# Patient Record
Sex: FEMALE | Race: WHITE | Employment: PART TIME | URBAN - METROPOLITAN AREA
[De-identification: names, ages, dates, MRNs, and addresses within clinical notes are randomized per-mention and may not be internally consistent; named-entity substitution may affect disease eponyms.]

---

## 2017-01-23 RX ORDER — BENAZEPRIL HYDROCHLORIDE 20 MG/1
TABLET ORAL
Qty: 30 TAB | Refills: 0 | Status: SHIPPED | OUTPATIENT
Start: 2017-01-23 | End: 2017-02-01 | Stop reason: SDUPTHER

## 2017-02-01 ENCOUNTER — OFFICE VISIT (OUTPATIENT)
Dept: FAMILY MEDICINE CLINIC | Age: 67
End: 2017-02-01

## 2017-02-01 ENCOUNTER — HOSPITAL ENCOUNTER (OUTPATIENT)
Dept: LAB | Age: 67
Discharge: HOME | End: 2017-02-01
Payer: MEDICARE

## 2017-02-01 VITALS
BODY MASS INDEX: 50.48 KG/M2 | SYSTOLIC BLOOD PRESSURE: 137 MMHG | TEMPERATURE: 96.6 F | HEIGHT: 59 IN | WEIGHT: 250.4 LBS | RESPIRATION RATE: 18 BRPM | HEART RATE: 64 BPM | OXYGEN SATURATION: 97 % | DIASTOLIC BLOOD PRESSURE: 68 MMHG

## 2017-02-01 DIAGNOSIS — K21.9 GASTROESOPHAGEAL REFLUX DISEASE WITHOUT ESOPHAGITIS: ICD-10-CM

## 2017-02-01 DIAGNOSIS — N39.3 STRESS INCONTINENCE: ICD-10-CM

## 2017-02-01 DIAGNOSIS — F41.9 ANXIETY: ICD-10-CM

## 2017-02-01 DIAGNOSIS — Z23 ENCOUNTER FOR IMMUNIZATION: ICD-10-CM

## 2017-02-01 DIAGNOSIS — I10 ESSENTIAL HYPERTENSION: Primary | ICD-10-CM

## 2017-02-01 PROCEDURE — 36415 COLL VENOUS BLD VENIPUNCTURE: CPT

## 2017-02-01 PROCEDURE — 80048 BASIC METABOLIC PNL TOTAL CA: CPT

## 2017-02-01 RX ORDER — ALPRAZOLAM 0.25 MG/1
0.25 TABLET ORAL
Qty: 60 TAB | Refills: 1 | Status: SHIPPED | OUTPATIENT
Start: 2017-02-01 | End: 2017-05-08 | Stop reason: SDUPTHER

## 2017-02-01 RX ORDER — BENAZEPRIL HYDROCHLORIDE 20 MG/1
TABLET ORAL
Qty: 30 TAB | Refills: 5 | Status: SHIPPED | OUTPATIENT
Start: 2017-02-01 | End: 2017-10-17 | Stop reason: SDUPTHER

## 2017-02-01 RX ORDER — NAPROXEN SODIUM 220 MG
220 TABLET ORAL 2 TIMES DAILY WITH MEALS
COMMUNITY
End: 2017-11-18

## 2017-02-01 RX ORDER — FUROSEMIDE 20 MG/1
20 TABLET ORAL DAILY
Qty: 30 TAB | Refills: 5 | Status: SHIPPED | OUTPATIENT
Start: 2017-02-01

## 2017-02-01 RX ORDER — OXYBUTYNIN CHLORIDE 5 MG/1
5 TABLET ORAL 2 TIMES DAILY
Qty: 60 TAB | Refills: 5 | Status: SHIPPED | OUTPATIENT
Start: 2017-02-01 | End: 2017-08-15 | Stop reason: SINTOL

## 2017-02-01 RX ORDER — OMEPRAZOLE 20 MG/1
20 CAPSULE, DELAYED RELEASE ORAL DAILY
Qty: 30 CAP | Refills: 5 | Status: SHIPPED | OUTPATIENT
Start: 2017-02-01 | End: 2017-12-19 | Stop reason: SDUPTHER

## 2017-02-02 LAB
BUN SERPL-MCNC: 19 MG/DL (ref 8–27)
BUN/CREAT SERPL: 30 (ref 11–26)
CALCIUM SERPL-MCNC: 9.1 MG/DL (ref 8.7–10.3)
CHLORIDE SERPL-SCNC: 101 MMOL/L (ref 96–106)
CO2 SERPL-SCNC: 25 MMOL/L (ref 18–29)
CREAT SERPL-MCNC: 0.63 MG/DL (ref 0.57–1)
GLUCOSE SERPL-MCNC: 87 MG/DL (ref 65–99)
POTASSIUM SERPL-SCNC: 4 MMOL/L (ref 3.5–5.2)
SODIUM SERPL-SCNC: 139 MMOL/L (ref 134–144)

## 2017-05-08 DIAGNOSIS — F41.9 ANXIETY: ICD-10-CM

## 2017-05-08 RX ORDER — ALPRAZOLAM 0.25 MG/1
TABLET ORAL
Qty: 60 TAB | Refills: 0 | Status: SHIPPED | OUTPATIENT
Start: 2017-05-08 | End: 2017-07-18 | Stop reason: SDUPTHER

## 2017-07-18 ENCOUNTER — HOSPITAL ENCOUNTER (OUTPATIENT)
Dept: LAB | Age: 67
Discharge: HOME | End: 2017-07-18
Payer: MEDICARE

## 2017-07-18 ENCOUNTER — OFFICE VISIT (OUTPATIENT)
Dept: INTERNAL MEDICINE CLINIC | Age: 67
End: 2017-07-18

## 2017-07-18 VITALS
SYSTOLIC BLOOD PRESSURE: 144 MMHG | HEART RATE: 60 BPM | BODY MASS INDEX: 50.6 KG/M2 | DIASTOLIC BLOOD PRESSURE: 88 MMHG | WEIGHT: 251 LBS | OXYGEN SATURATION: 99 % | TEMPERATURE: 97.9 F | HEIGHT: 59 IN | RESPIRATION RATE: 18 BRPM

## 2017-07-18 DIAGNOSIS — I10 ESSENTIAL HYPERTENSION: ICD-10-CM

## 2017-07-18 DIAGNOSIS — Z23 ENCOUNTER FOR IMMUNIZATION: ICD-10-CM

## 2017-07-18 DIAGNOSIS — Z12.31 SCREENING MAMMOGRAM, ENCOUNTER FOR: ICD-10-CM

## 2017-07-18 DIAGNOSIS — E66.01 MORBID OBESITY DUE TO EXCESS CALORIES (HCC): Primary | ICD-10-CM

## 2017-07-18 DIAGNOSIS — R73.9 ELEVATED BLOOD SUGAR: ICD-10-CM

## 2017-07-18 DIAGNOSIS — E66.9 OBESITY, UNSPECIFIED OBESITY SEVERITY, UNSPECIFIED OBESITY TYPE: ICD-10-CM

## 2017-07-18 DIAGNOSIS — F41.9 ANXIETY: ICD-10-CM

## 2017-07-18 DIAGNOSIS — R73.03 PRE-DIABETES: ICD-10-CM

## 2017-07-18 DIAGNOSIS — Z12.11 COLON CANCER SCREENING: ICD-10-CM

## 2017-07-18 DIAGNOSIS — N39.3 STRESS INCONTINENCE: ICD-10-CM

## 2017-07-18 PROCEDURE — 36415 COLL VENOUS BLD VENIPUNCTURE: CPT

## 2017-07-18 PROCEDURE — 80061 LIPID PANEL: CPT

## 2017-07-18 PROCEDURE — 80053 COMPREHEN METABOLIC PANEL: CPT

## 2017-07-18 PROCEDURE — 82270 OCCULT BLOOD FECES: CPT

## 2017-07-18 PROCEDURE — 83036 HEMOGLOBIN GLYCOSYLATED A1C: CPT

## 2017-07-18 RX ORDER — ALPRAZOLAM 0.25 MG/1
0.25 TABLET ORAL
Qty: 30 TAB | Refills: 1 | Status: SHIPPED | OUTPATIENT
Start: 2017-07-18 | End: 2018-01-07 | Stop reason: SDUPTHER

## 2017-07-18 NOTE — PROGRESS NOTES
Chief Complaint   Patient presents with   Cathy Santana [facility]     new patient    Spasms     x1 year in thighs      1. Have you been to the ER, urgent care clinic since your last visit? Hospitalized since your last visit? No    2. Have you seen or consulted any other health care providers outside of the [address] [place] [address] since your last visit? Include any pap smears or colon screening.  No

## 2017-07-18 NOTE — PROGRESS NOTES
CC: Establish Care (new patient)   Anxiety  HTN   obesity     HPI:    She is a 79 y.o. female who presents to establish care and follow up on chronic medical problems     HTN:  Blood pressure is well controlled. Denies chest pain and shortness of breath  On benazepril and lasix and denies chest pain and shortness of breath   Does not check BP at home       Obesity BMI 50 - weight watchers for 2 years and has lost 50 lbs. Had a scare leading to changing eating habits- had chest pain and cardiac work up was negative\"  it was an anxiety attack - told by cardiologist you  \"Can loose weight  Or die\"  Not on medication for weight loss. Worries about developing diabetes has pre diabetes   Patient has reached plateau and after loosing 50 lbs has not been able to loose any more weight   Exercised a Curves - hs not been going as frequently lately  Cannot take phentermine due to underlying anxiety  Sister had bad reaction to Wellbutrin and patient would not want to try the medication    Anxiety: takes xanax . 25mg PRN. Does not want to get addicted. At times takes it a  night time  Can goweeks without taking. Last filled prescription in early May ( 60 pills)  and requesting refill   Denies symptoms of depression      Gyn hx   Had 3 pregnancies 2 deliveries and 1    Last pap smear 2 year ago and normal     Mammogram in may 2016 and normal   FIT testing negative in 2016    ROS:  Constitutional: negative for fevers, chills, anorexia and weight loss  Eyes:   negative for visual disturbance,  irritation  ENT:   negative for tinnitus,sore throat,nasal congestion,ear pain, sinus pain.    Respiratory:  negative for cough, hemoptysis, dyspnea,wheezing  CV:   negative for chest pain, palpitations, lower extremity edema  GI:   negative for nausea, vomiting, diarrhea, abdominal pain,melena  Genitourinary: Takes oxybutinin for overactive bladder and at times has poor control/ not doing kegel exercises  Musculoskel: negative for myalgias, arthralgias, back pain, muscle weakness, joint pain  Neurological:  negative for headaches, dizziness, focal weakness, numbness  Psych:             Negative for depression and anxiety    Past Medical History:   Diagnosis Date    Arthritis     Hypertension        Current Outpatient Prescriptions on File Prior to Visit   Medication Sig Dispense Refill    benazepril (LOTENSIN) 20 mg tablet TAKE ONE TABLET BY MOUTH ONCE DAILY 30 Tab 5    oxybutynin (DITROPAN) 5 mg tablet Take 1 Tab by mouth two (2) times a day. 60 Tab 5    omeprazole (PRILOSEC) 20 mg capsule Take 1 Cap by mouth daily. 30 Cap 5    furosemide (LASIX) 20 mg tablet Take 1 Tab by mouth daily. 30 Tab 5    [DISCONTINUED] ALPRAZolam (XANAX) 0.25 mg tablet TAKE ONE TABLET BY MOUTH TWICE DAILY AS NEEDED *MAX  DAILY  AMOUNT  0.5MG* 60 Tab 0    naproxen sodium (ALEVE) 220 mg tablet Take 220 mg by mouth two (2) times daily (with meals).  diph,Pertuss,AC,,Tet Vac-PF (BOOSTRIX) 2.5-8-5 Lf-mcg suspension 0.5 mL by IntraMUSCular route PRIOR TO DISCHARGE for 1 dose. 0.5 mL 0    naproxen (NAPROSYN) 500 mg tablet   3     No current facility-administered medications on file prior to visit. Past Surgical History:   Procedure Laterality Date    HX CHOLECYSTECTOMY      HX HEENT      HX ORTHOPAEDIC      Bilateral Knee Replacement       Family History   Problem Relation Age of Onset    Cancer Brother      Reviewed and no changes     Social History     Social History    Marital status:      Spouse name: N/A    Number of children: N/A    Years of education: N/A     Occupational History    Not on file.      Social History Main Topics    Smoking status: Never Smoker    Smokeless tobacco: Never Used    Alcohol use No    Drug use: No    Sexual activity: Not on file     Other Topics Concern    Not on file     Social History Narrative            Visit Vitals    /88 (BP 1 Location: Right arm, BP Patient Position: Sitting)    Pulse 60    Temp 97.9 °F (36.6 °C) (Oral)    Resp 18    Ht 4' 11\" (1.499 m)    Wt 251 lb (113.9 kg)    SpO2 99%    BMI 50.7 kg/m2       Physical Examination:   General - Well appearing female, morbidly obese   HEENT - PERRL, TM no erythema/opacification, normal nasal turbinates, oropharynx no erythema or exudate, MMM  Neck - supple, no bruits, no TMG, no LAD  Pulm - clear to auscultation bilaterally  Cardio - RRR, normal S1 S2, no murmur gallops or rubs  Abd - soft, nontender, no masses, no HSM  Extrem - no edema, +2 distal pulses  Psych - normal affect, appropriate mood  Skin: varicose veins palpable and non- tender     Lab Results   Component Value Date/Time    WBC 7.7 03/03/2016 11:52 AM    HGB 14.6 03/03/2016 11:52 AM    HCT 43.4 03/03/2016 11:52 AM    PLATELET 158 52/28/7802 11:52 AM    MCV 82 03/03/2016 11:52 AM     Lab Results   Component Value Date/Time    Sodium 139 02/01/2017 03:43 PM    Potassium 4.0 02/01/2017 03:43 PM    Chloride 101 02/01/2017 03:43 PM    CO2 25 02/01/2017 03:43 PM    Anion gap 6 01/30/2015 10:45 AM    Glucose 87 02/01/2017 03:43 PM    BUN 19 02/01/2017 03:43 PM    Creatinine 0.63 02/01/2017 03:43 PM    BUN/Creatinine ratio 30 02/01/2017 03:43 PM    GFR est  02/01/2017 03:43 PM    GFR est non-AA 94 02/01/2017 03:43 PM    Calcium 9.1 02/01/2017 03:43 PM     Lab Results   Component Value Date/Time    Cholesterol, total 202 05/27/2016 10:00 AM    HDL Cholesterol 53 05/27/2016 10:00 AM    LDL, calculated 135 05/27/2016 10:00 AM    VLDL, calculated 14 05/27/2016 10:00 AM    Triglyceride 69 05/27/2016 10:00 AM     Lab Results   Component Value Date/Time    TSH 1.600 03/03/2016 11:52 AM     No results found for: PSA, Alejandro Kimberly, PSAR3, [id number], [id number], PSALT  Lab Results   Component Value Date/Time    Hemoglobin A1c (POC) 5.8 05/27/2016 10:00 AM     No results found for: MARIE Ramirez    Lab Results Component Value Date/Time    ALT (SGPT) 18 03/03/2016 11:52 AM    AST (SGOT) 14 03/03/2016 11:52 AM    Alk. phosphatase 89 03/03/2016 11:52 AM    Bilirubin, total 0.5 03/03/2016 11:52 AM           Assessment/Plan:    1. Anxiety  - with occasional panic attacks. - continue current therapy ( PRN use of low dose xanax)   - discussed with patient that if use becomes frequent then need to consider maintenance therapy with SSRI   - ALPRAZolam (XANAX) 0.25 mg tablet; Take 1 Tab by mouth daily as needed for Anxiety. Dispense: 30 Tab; Refill: 1    2. Morbid obesity due to excess calories ([address]  - has lost 50 lbs intentional but now reached plateau. Patient is motivated to loose weight. I believe she is a great candidate for saxenda given she has pre diabetes also. Patient is not a good candidate for phentermine ( has anxiety) and sister had reaction to wellbutrin. - liraglutide (SAXENDA) 3 mg/0.5 mL (18 mg/3 mL) pen; 0.5 mL by SubCUTAneous route daily. Dispense: 1 Adjustable Dose Pre-filled Pen Syringe; Refill: 4 will need prior authorization   - encouraged to exercise     3. Stress incontinence  - on oxybutynin  - discussed Kegel exercises     4. Essential hypertension  - well controlled  - CMP ordered  - continue benazepril and lasix low dose     5. Screening mammogram, encounter for  - [facility] MAMMO BI SCREENING INCL CAD; Future    6. Colon cancer screening  - FIT testing     7. Pre diabetes: checking lipid panel and HA1C  - continue diet and exercise        Follow-up Disposition:  Return in about 4 weeks (around 8/15/2017) for medicare wellness visit .     MD Rafael

## 2017-07-18 NOTE — PATIENT INSTRUCTIONS
Prescribed saxenda - will need prior authorization   Continue with diet and exercise     Schedule the medicare wellness visit    Schedule mammogram    Send stool testing

## 2017-07-18 NOTE — MR AVS SNAPSHOT
Visit Information Date & Time Provider Department Dept. Phone Encounter #  
 7/18/2017  8:30 AM Nikki Orosco, [address] [place] [address],[address] [phone] [id number] Follow-up Instructions Return in about 4 weeks (around 8/15/2017) for medicare wellness visit . Upcoming Health Maintenance Date Due  
 GLAUCOMA SCREENING Q2Y 6/28/2015 OSTEOPOROSIS SCREENING (DEXA) 6/28/2015 FOBT Q 1 YEAR AGE 50-75 3/5/2017 Pneumococcal 65+ Low/Medium Risk (2 of 2 - PCV13) 5/27/2017 MEDICARE YEARLY EXAM 5/28/2017 INFLUENZA AGE 9 TO ADULT 8/1/2017 BREAST CANCER SCRN MAMMOGRAM 6/28/2018 DTaP/Tdap/Td series (2 - Td) 3/22/2027 Allergies as of 7/18/2017  Review Complete On: 7/18/2017 By: Nato Bello Severity Noted Reaction Type Reactions Latex Medium 05/27/2016    Rash Adhesive Medium 08/25/2016    Rash Raspberry Medium 05/27/2016    Rash Tetracycline Low 02/29/2012   Systemic Hives Current Immunizations  Reviewed on 3/23/2017 Name Date Pneumococcal Conjugate (PCV-13)  Incomplete Pneumococcal Polysaccharide (PPSV-23) 5/27/2016 Tdap 3/22/2017 Not reviewed this visit You Were Diagnosed With   
  
 Codes Comments Morbid obesity due to excess calories ([facility] [address]    -  Primary ICD-10-CM: E66.01 
ICD-9-CM: 278.01 Anxiety     ICD-10-CM: F41.9 ICD-9-CM: 300.00 Obesity, unspecified obesity severity, unspecified obesity type     ICD-10-CM: E66.9 ICD-9-CM: 278.00 Stress incontinence     ICD-10-CM: N39.3 ICD-9-CM: [id number] Essential hypertension     ICD-10-CM: I10 
ICD-9-CM: 401.9 Screening mammogram, encounter for     ICD-10-CM: Z12.31 
ICD-9-CM: V76.12 Colon cancer screening     ICD-10-CM: Z12.11 ICD-9-CM: V76.51 Elevated blood sugar     ICD-10-CM: R73.9 ICD-9-CM: 790.29 Pre-diabetes     ICD-10-CM: R73.03 
ICD-9-CM: 790.29 Encounter for immunization     ICD-10-CM: T55 ICD-9-CM: V03.89 Vitals BP Pulse Temp Resp Height(growth percentile) Weight(growth percentile) 144/88 (BP 1 Location: Right arm, BP Patient Position: Sitting) 60 97.9 °F (36.6 °C) (Oral) 18 4' 11\" (1.499 m) 251 lb (113.9 kg) SpO2 BMI OB Status Smoking Status 99% 50.7 kg/m2 Postmenopausal Never Smoker BMI and BSA Data Body Mass Index Body Surface Area 50.7 kg/m 2 2.18 m 2 Preferred Pharmacy Pharmacy Name Phone [facility] [address], [address] [place] [address] Avenue [phone] Your Updated Medication List  
  
   
This list is accurate as of: 17  9:11 AM.  Always use your most recent med list.  
  
  
  
  
 ALEVE 220 mg tablet Generic drug:  naproxen sodium Take 220 mg by mouth two (2) times daily (with meals). ALPRAZolam 0.25 mg tablet Commonly known as:  Martinez Addy Take 1 Tab by mouth daily as needed for Anxiety. benazepril 20 mg tablet Commonly known as:  LOTENSIN  
TAKE ONE TABLET BY MOUTH ONCE DAILY  
  
 diphPertya(AC),Tet Vac-PF 2.5-8-5 Lf-mcg suspension Commonly known as:  BOOSTRIX  
0.5 mL by IntraMUSCular route PRIOR TO DISCHARGE for 1 dose. furosemide 20 mg tablet Commonly known as:  LASIX Take 1 Tab by mouth daily. liraglutide 3 mg/0.5 mL (18 mg/3 mL) pen Commonly known as:  SAXENDA  
0.5 mL by SubCUTAneous route daily. naproxen 500 mg tablet Commonly known as:  NAPROSYN  
  
 omeprazole 20 mg capsule Commonly known as:  PRILOSEC Take 1 Cap by mouth daily. oxybutynin 5 mg tablet Commonly known as:  KSFXKFMO Take 1 Tab by mouth two (2) times a day. Prescriptions Printed Refills ALPRAZolam (XANAX) 0.25 mg tablet 1 Sig: Take 1 Tab by mouth daily as needed for Anxiety. Class: Print Route: Oral  
  
Prescriptions Sent to Pharmacy Refills  
 liraglutide (SAXENDA) 3 mg/0.5 mL (18 mg/3 mL) pen 4 Si.5 mL by SubCUTAneous route daily.   
 Class: Normal  
 Pharmacy: [facility] [address] , [address] [place] [address] #: [phone] Route: SubCUTAneous We Performed the Following HEMOGLOBIN A1C WITH EAG [15476 CPT(R)] LIPID PANEL [25140 CPT(R)] METABOLIC PANEL, COMPREHENSIVE [40037 CPT(R)] OCCULT BLOOD, IMMUNOASSAY (FIT) [id number] CPT(R)] PNEUMOCOCCAL CONJ VACCINE 13 VALENT IM [id number] CPT(R)] LA IMMUNIZ ADMIN,1 SINGLE/COMB VAC/TOXOID [id number] CPT(R)] Follow-up Instructions Return in about 4 weeks (around 8/15/2017) for medicare wellness visit . To-Do List   
 07/18/2017 Imaging:  [facility] MAMMO BI SCREENING INCL CAD Patient Instructions Prescribed saxenda - will need prior authorization Continue with diet and exercise Schedule the medicare wellness visit Schedule mammogram 
 
Send stool testing Introducing [facility]! Carli Alejandro introduces [employer] patient portal. Now you can access parts of your medical record, email your doctor's office, and request medication refills online. 1. In your internet browser, go to [email or web address]. [email or web address] 2. Click on the First Time User? Click Here link in the Sign In box. You will see the New Member Sign Up page. 3. Enter your [employer] Access Code exactly as it appears below. You will not need to use this code after youve completed the sign-up process. If you do not sign up before the expiration date, you must request a new code. · [employer] Access Code: [email or web address] Expires: 10/16/2017  9:11 AM 
 
4. Enter the last four digits of your Social Security Number (xxxx) and Date of Birth (mm/dd/yyyy) as indicated and click Submit. You will be taken to the next sign-up page. 5. Create a [employer] ID. This will be your [employer] login ID and cannot be changed, so think of one that is secure and easy to remember. 6. Create a [employer] password. You can change your password at any time. 7. Enter your Password Reset Question and Answer. This can be used at a later time if you forget your password. 8. Enter your e-mail address. You will receive e-mail notification when new information is available in [address] 9. Click Sign Up. You can now view and download portions of your medical record. 10. Click the Download Summary menu link to download a portable copy of your medical information. If you have questions, please visit the Frequently Asked Questions section of the [email or web address] website. Remember, [email or web address] is NOT to be used for urgent needs. For medical emergencies, dial 911. Now available from your iPhone and Android! Please provide this summary of care documentation to your next provider. Your primary care clinician is listed as LANRE PENNINGTON [address] [place] [address]. If you have any questions after today's visit, please call [phone].

## 2017-07-19 LAB
ALBUMIN SERPL-MCNC: 3.9 G/DL (ref 3.6–4.8)
ALBUMIN/GLOB SERPL: 1.8 {RATIO} (ref 1.2–2.2)
ALP SERPL-CCNC: 84 IU/L (ref 39–117)
ALT SERPL-CCNC: 17 IU/L (ref 0–32)
AST SERPL-CCNC: 13 IU/L (ref 0–40)
BILIRUB SERPL-MCNC: 0.4 MG/DL (ref 0–1.2)
BUN SERPL-MCNC: 27 MG/DL (ref 8–27)
BUN/CREAT SERPL: 40 (ref 12–28)
CALCIUM SERPL-MCNC: 9.1 MG/DL (ref 8.7–10.3)
CHLORIDE SERPL-SCNC: 101 MMOL/L (ref 96–106)
CHOLEST SERPL-MCNC: 206 MG/DL (ref 100–199)
CO2 SERPL-SCNC: 26 MMOL/L (ref 18–29)
CREAT SERPL-MCNC: 0.67 MG/DL (ref 0.57–1)
EST. AVERAGE GLUCOSE BLD GHB EST-MCNC: 114 MG/DL
GLOBULIN SER CALC-MCNC: 2.2 G/DL (ref 1.5–4.5)
GLUCOSE SERPL-MCNC: 88 MG/DL (ref 65–99)
HBA1C MFR BLD: 5.6 % (ref 4.8–5.6)
HDLC SERPL-MCNC: 57 MG/DL
LDLC SERPL CALC-MCNC: 134 MG/DL (ref 0–99)
POTASSIUM SERPL-SCNC: 4.7 MMOL/L (ref 3.5–5.2)
PROT SERPL-MCNC: 6.1 G/DL (ref 6–8.5)
SODIUM SERPL-SCNC: 141 MMOL/L (ref 134–144)
TRIGL SERPL-MCNC: 77 MG/DL (ref 0–149)
VLDLC SERPL CALC-MCNC: 15 MG/DL (ref 5–40)

## 2017-07-20 ENCOUNTER — TELEPHONE (OUTPATIENT)
Dept: INTERNAL MEDICINE CLINIC | Age: 67
End: 2017-07-20

## 2017-07-20 DIAGNOSIS — E66.01 MORBID OBESITY DUE TO EXCESS CALORIES (HCC): Primary | ICD-10-CM

## 2017-07-20 NOTE — TELEPHONE ENCOUNTER
Pt states the medication that was prescribed is $1300 per month. Is there a plan B? What other medication is available?   Please call pt     Leave message on cell #[phone]

## 2017-07-20 NOTE — TELEPHONE ENCOUNTER
Spoke with patient. Advised will send to Dr. Monty Durbin for alternative. She states glucophage was discussed at visit. Patient has medicare, so even with [facility] Solares still would not be covered.

## 2017-07-21 RX ORDER — BUPROPION HYDROCHLORIDE 150 MG/1
150 TABLET ORAL
Qty: 30 TAB | Refills: 0 | Status: SHIPPED | OUTPATIENT
Start: 2017-07-21 | End: 2017-08-15 | Stop reason: SDUPTHER

## 2017-07-21 NOTE — PROGRESS NOTES
Kidney and liver function are normal. BUN is mildly elevated likely due to mild dehydration - increase water intake     Cholesterol is essentially the same compared to previous year. Cholesterol: Triglycerides are excellent  ( short term fat storage), HDL good cholesterol is at goal,  LDL which is bad cholesterol is mildly elevated. Recommend increasing  exercise to 30 minutes daily and increased fiber intake - vegetables, fruits and oats and whole grain. Decreasing fatty food intake. We will repeat cholesterol level in one year.    Hemoglobin A1C is 5.6 - which is normal.   Remind patient to do FIT testing for colon cancer screening

## 2017-07-21 NOTE — TELEPHONE ENCOUNTER
Patient states she's returning your call. Please call her at work number attached & just ask for her.  Thank you

## 2017-07-21 NOTE — TELEPHONE ENCOUNTER
Left detailed message for patient with Dr. Andrea Louie recommendations to try Wellbutrin. Requested return call to discuss.

## 2017-07-21 NOTE — TELEPHONE ENCOUNTER
MD Oxana Hall LPN        Caller: Unspecified (Yesterday, 11:20 AM)                     Patient does not have diabetes or pre diabetes so cannot prescribe metformin. Can prescribe Wellbutrin which is also used for tobacco cessation and depression it helps decrease binge eating and curb appetite. I can prescribe it if patient wants to try medication.

## 2017-07-24 ENCOUNTER — TELEPHONE (OUTPATIENT)
Dept: INTERNAL MEDICINE CLINIC | Age: 67
End: 2017-07-24

## 2017-07-24 NOTE — PROGRESS NOTES
Spoke with patient and advised of results and recommendations per Dr. Zulma Ojeda. Patient verbalized understanding and states no questions at this time. FIT testing done this morning per patient.

## 2017-07-29 LAB — HEMOCCULT STL QL IA: NEGATIVE

## 2017-08-15 ENCOUNTER — OFFICE VISIT (OUTPATIENT)
Dept: INTERNAL MEDICINE CLINIC | Age: 67
End: 2017-08-15

## 2017-08-15 ENCOUNTER — HOSPITAL ENCOUNTER (OUTPATIENT)
Dept: LAB | Age: 67
Discharge: HOME | End: 2017-08-15
Payer: MEDICARE

## 2017-08-15 VITALS
WEIGHT: 248.2 LBS | TEMPERATURE: 97.7 F | HEART RATE: 64 BPM | HEIGHT: 59 IN | RESPIRATION RATE: 18 BRPM | DIASTOLIC BLOOD PRESSURE: 77 MMHG | OXYGEN SATURATION: 99 % | BODY MASS INDEX: 50.04 KG/M2 | SYSTOLIC BLOOD PRESSURE: 162 MMHG

## 2017-08-15 DIAGNOSIS — E66.01 MORBID OBESITY DUE TO EXCESS CALORIES (HCC): ICD-10-CM

## 2017-08-15 DIAGNOSIS — N32.81 OVERACTIVE BLADDER: Primary | ICD-10-CM

## 2017-08-15 DIAGNOSIS — Z12.31 ENCOUNTER FOR SCREENING MAMMOGRAM FOR MALIGNANT NEOPLASM OF BREAST: ICD-10-CM

## 2017-08-15 DIAGNOSIS — Z13.39 SCREENING FOR ALCOHOLISM: ICD-10-CM

## 2017-08-15 DIAGNOSIS — E86.0 DEHYDRATION: ICD-10-CM

## 2017-08-15 DIAGNOSIS — Z78.0 POSTMENOPAUSAL: ICD-10-CM

## 2017-08-15 DIAGNOSIS — Z13.21 ENCOUNTER FOR VITAMIN DEFICIENCY SCREENING: ICD-10-CM

## 2017-08-15 DIAGNOSIS — Z00.00 ROUTINE GENERAL MEDICAL EXAMINATION AT A HEALTH CARE FACILITY: ICD-10-CM

## 2017-08-15 DIAGNOSIS — M85.80 AGE-RELATED BONE LOSS: ICD-10-CM

## 2017-08-15 PROCEDURE — 80053 COMPREHEN METABOLIC PANEL: CPT

## 2017-08-15 PROCEDURE — 36415 COLL VENOUS BLD VENIPUNCTURE: CPT

## 2017-08-15 RX ORDER — BUPROPION HYDROCHLORIDE 150 MG/1
150 TABLET ORAL
Qty: 30 TAB | Refills: 5 | Status: SHIPPED | OUTPATIENT
Start: 2017-08-15 | End: 2018-02-15 | Stop reason: SDUPTHER

## 2017-08-15 NOTE — MR AVS SNAPSHOT
Visit Information Date & Time Provider Department Dept. Phone Encounter #  
 8/15/2017  8:15 AM Alcon Smalls, [address] [place] [address],[address] [phone] [id number] Follow-up Instructions Return in about 3 months (around 11/15/2017) for weight and blood pressure. Upcoming Health Maintenance Date Due OSTEOPOROSIS SCREENING (DEXA) 6/28/2015 INFLUENZA AGE 9 TO ADULT 8/1/2017 BREAST CANCER SCRN MAMMOGRAM 6/28/2018 FOBT Q 1 YEAR AGE 50-75 7/18/2018 MEDICARE YEARLY EXAM 8/16/2018 GLAUCOMA SCREENING Q2Y 1/10/2019 DTaP/Tdap/Td series (2 - Td) 3/22/2027 Allergies as of 8/15/2017  Review Complete On: 8/15/2017 By: Leo Milder Severity Noted Reaction Type Reactions Latex Medium 05/27/2016    Rash Adhesive Medium 08/25/2016    Rash Raspberry Medium 05/27/2016    Rash Tetracycline Low 02/29/2012   Systemic Hives Current Immunizations  Reviewed on 3/23/2017 Name Date Pneumococcal Conjugate (PCV-13) 7/18/2017  9:30 AM  
 Pneumococcal Polysaccharide (PPSV-23) 5/27/2016 Tdap 3/22/2017 Not reviewed this visit You Were Diagnosed With   
  
 Codes Comments Overactive bladder    -  Primary ICD-10-CM: N32.81 ICD-9-CM: 596.51 Routine general medical examination at a health care facility     ICD-10-CM: Z00.00 ICD-9-CM: V70.0 Screening for alcoholism     ICD-10-CM: Z13.89 ICD-9-CM: V79.1 Encounter for screening mammogram for malignant neoplasm of breast     ICD-10-CM: Z12.31 
ICD-9-CM: V76.12 Dehydration     ICD-10-CM: E86.0 ICD-9-CM: 276.51 Encounter for vitamin deficiency screening     ICD-10-CM: Z13.21 ICD-9-CM: V77.99 Age-related bone loss     ICD-10-CM: M85.80 ICD-9-CM: 733.90 Postmenopausal     ICD-10-CM: Z78.0 ICD-9-CM: V49.81 Morbid obesity due to excess calories (HCC)     ICD-10-CM: E66.01 
ICD-9-CM: 278.01 Vitals BP Pulse Temp Resp Height(growth percentile) Weight(growth percentile) 162/77 (BP 1 Location: Right arm, BP Patient Position: Sitting) 64 97.7 °F (36.5 °C) (Oral) 18 4' 11\" (1.499 m) 248 lb 3.2 oz (112.6 kg) SpO2 BMI OB Status Smoking Status 99% 50.13 kg/m2 Postmenopausal Never Smoker BMI and BSA Data Body Mass Index Body Surface Area  
 50.13 kg/m 2 2.17 m 2 Preferred Pharmacy Pharmacy Name Phone [facility] [address], [address] [phone] Your Updated Medication List  
  
   
This list is accurate as of: 8/15/17  8:41 AM.  Always use your most recent med list.  
  
  
  
  
 ALEVE 220 mg tablet Generic drug:  naproxen sodium Take 220 mg by mouth two (2) times daily (with meals). ALPRAZolam 0.25 mg tablet Commonly known as:  Trula Crick Take 1 Tab by mouth daily as needed for Anxiety. benazepril 20 mg tablet Commonly known as:  LOTENSIN  
TAKE ONE TABLET BY MOUTH ONCE DAILY  
  
 buPROPion  mg tablet Commonly known as:  Harden Fuel Take 1 Tab by mouth every morning. diph,Pertuss(AC),Tet Vac-PF 2.5-8-5 Lf-mcg suspension Commonly known as:  BOOSTRIX  
0.5 mL by IntraMUSCular route PRIOR TO DISCHARGE for 1 dose. furosemide 20 mg tablet Commonly known as:  LASIX Take 1 Tab by mouth daily. liraglutide 3 mg/0.5 mL (18 mg/3 mL) pen Commonly known as:  SAXENDA  
0.5 mL by SubCUTAneous route daily. mirabegron ER 25 mg ER tablet Commonly known as:  MYRBETRIQ Take 1 Tab by mouth daily. naproxen 500 mg tablet Commonly known as:  NAPROSYN  
  
 omeprazole 20 mg capsule Commonly known as:  PRILOSEC Take 1 Cap by mouth daily. Prescriptions Sent to Pharmacy Refills  
 mirabegron ER (MYRBETRIQ) 25 mg ER tablet 5 Sig: Take 1 Tab by mouth daily.   
 Class: Normal  
 Pharmacy: [facility] [address], [address] Ph #: [phone] Route: Oral  
 buPROPion XL (WELLBUTRIN XL) 150 mg tablet 5 Sig: Take 1 Tab by mouth every morning. Class: Normal  
 Pharmacy: [address] [place], [address] Ph #: [phone] Route: Oral  
  
We Performed the Following METABOLIC PANEL, COMPREHENSIVE [53196 CPT(R)] Follow-up Instructions Return in about 3 months (around 11/15/2017) for weight and blood pressure. To-Do List   
 08/15/2017 Imaging:  DEXA BONE DENSITY STUDY AXIAL   
  
 08/18/2017 Imaging:  [facility] MAMMO BI SCREENING INCL CAD   
  
 08/31/2017 7:30 AM  
  Appointment with [facility] 2 at [address] [place] [address] ([phone]) Shower or bathe using soap and water. Do not use deodorant, powder, perfumes, or lotion the day of your exam.  If your prior mammograms were not performed at [facility] 6 please bring films with you or forward prior images 2 days before your procedure. Check in at registration 15min before your appointment time unless you were instructed to do otherwise. A script is not necessary, but if you have one, please bring it on the day of the mammogram or have it faxed to the department. [facility] [phone] [facility]  [phone] [facility] [address]  [phone] [facility] [phone] [address] [phone] Patient Instructions 08/15/2017 Medicare Part B Preventive Services Limitations Recommendation/Date completed if known Scheduled/ Next Due Bone Mass Measurement 
(age 72 & older, biennial) Requires diagnosis related to osteoporosis or estrogen deficiency. Biennial benefit unless patient has history of long-term glucocorticoid tx or baseline is needed because initial test was by other method Completed: 
 
 
Recommended every 2 years DUE:Now ordererd Cardiovascular Screening Blood Tests (every 5 years) Total cholesterol, HDL, Triglycerides Order as a panel if possible Completed:july 2017 Recommended annually DUE:july 2018 Colorectal Cancer Screening 
-Fecal occult blood test (annual) -Flexible sigmoidoscopy (5y) 
-Screening colonoscopy (10y) -Barium Enema  Completed:july 2017 Recommended every 10 years DUE:july 2018 Counseling to Prevent Tobacco Use (up to 8 sessions per year) - Counseling greater than 3 and up to 10 minutes - Counseling greater than 10 minutes Patients must be asymptomatic of tobacco-related conditions to receive as preventive service Diabetes Screening Tests (at least every 3 years, Medicare covers annually or at 6-month intervals for prediabetic patients) Fasting blood sugar (FBS) or glucose tolerance test (GTT) Patient must be diagnosed with one of the following: 
-Hypertension, Dyslipidemia, obesity, previous impaired FBS or GTT 
Or any two of the following: overweight, FH of diabetes, age ? 72, history of gestational diabetes, birth of baby weighing more than 9 pounds Completed:07/2017 Recommended annually DUE:07/2018 Diabetes Self-Management Training (DSMT) (no USPSTF recommendation) Requires referral by treating physician for patient with diabetes or renal disease. 10 hours of initial DSMT session of no less than 30 minutes each in a continuous 12-month period. 2 hours of follow-up DSMT in subsequent years. Glaucoma Screening (no USPSTF recommendation) Diabetes mellitus, family history, , age 48 or over,  American, age 72 or over Completed:Jan 2017 Recommended annually DUE:Jan 2019 Human Immunodeficiency Virus (HIV) Screening (annually for increased risk patients) HIV-1 and HIV-2 by EIA, KIARRA, rapid antibody test, or oral mucosa transudate Patient must be at increased risk for HIV infection per USPSTF guidelines or pregnant. Tests covered annually for patients at increased risk. Pregnant patients may receive up to 3 test during pregnancy. Medical Nutrition Therapy (MNT) (for diabetes or renal disease not recommended schedule) Requires referral by treating physician for patient with diabetes or renal disease. Can be provided in same year as diabetes self-management training (DSMT), and CMS recommends medical nutrition therapy take place after DSMT. Up to 3 hours for initial year and 2 hours in subsequent years. Prostate Cancer Screening (annually up to age 76) - Digital rectal exam (SULEIMAN) - Prostate specific antigen (PSA) Annually (age 48 or over), SULEIMAN not paid separately when covered E/M service is provided on same date Completed: 
 
 
 
Recommended annually DUE:Not indicated Seasonal Influenza Vaccination (annually)  Completed: 
 
 
 Recommended annually DUE every Fall Pneumococcal Vaccination (once after 65)  Completed: A Shingles Vaccine is also recommended once in a lifetime after age 61. Hepatitis B Vaccinations (if medium/high risk) Medium/high risk factors:  End-stage renal disease, Hemophiliacs who received Factor VIII or IX concentrates, Clients of institutions for the mentally retarded, Persons who live in the same house as a HepB virus carrier, Homosexual men, Illicit injectable drug abusers. Screening Mammography (biennial age 54-69)? Annually (age 36 or over) Completed:  
 
Recommended annually DUE:already has appointment Screening Pap Tests and Pelvic Examination (up to age 79 and after 79 if unknown history or abnormal study last 10 years) Every 24 months except high risk Completed: 
 
 
 
Recommended every 2 years DUE:not indicated anymore Ultrasound Screening for Abdominal Aortic Aneurysm (AAA) (once) Patient must be referred through IPPE and not have had a screening for abdominal aortic aneurysm before under Medicare. Limited to patients who meet one of the following criteria: 
- Men who are 73-68 years old and have smoked more than 100 cigarettes in their lifetime. 
-Anyone with a FH of AAA -Anyone recommended for screening by USPSTF Not covered by Medicare as preventive. Not indicated Thanks for coming in today. It was nice to spend some time with you. If you have any questions about your visit today, please call [phone] and ask for [facility]. Take vitamin D 800 to  1000 units daily Stop the oxybutynin and start myrbetriq Introducing [address]! Michael Lopez introduces Any.DO patient portal. Now you can access parts of your medical record, email your doctor's office, and request medication refills online. 1. In your internet browser, go to [email or web address] 2. Click on the First Time User? Click Here link in the Sign In box. You will see the New Member Sign Up page. 3. Enter your Any.DO Access Code exactly as it appears below. You will not need to use this code after youve completed the sign-up process. If you do not sign up before the expiration date, you must request a new code. · Any.DO Access Code: [email or web address] Expires: 10/16/2017  9:11 AM 
 
4. Enter the last four digits of your Social Security Number (xxxx) and Date of Birth (mm/dd/yyyy) as indicated and click Submit. You will be taken to the next sign-up page. 5. Create a Any.DO ID. This will be your Any.DO login ID and cannot be changed, so think of one that is secure and easy to remember. 6. Create a Any.DO password. You can change your password at any time. 7. Enter your Password Reset Question and Answer. This can be used at a later time if you forget your password. 8. Enter your e-mail address. You will receive e-mail notification when new information is available in [address] 9. Click Sign Up. You can now view and download portions of your medical record. 10. Click the Download Summary menu link to download a portable copy of your medical information.  
 
If you have questions, please visit the Frequently Asked Questions section of the dBMEDx. Remember, [facility] is NOT to be used for urgent needs. For medical emergencies, dial 911. Now available from your iPhone and Android! Please provide this summary of care documentation to your next provider. Your primary care clinician is listed as LANRE Robles. If you have any questions after today's visit, please call [phone].

## 2017-08-15 NOTE — PROGRESS NOTES
Chief Complaint   Patient presents with   [facility] Annual Wellness Visit     1. Have you been to the ER, urgent care clinic since your last visit? Hospitalized since your last visit? No    2. Have you seen or consulted any other health care providers outside of the [address] [place] [address] since your last visit? Include any pap smears or colon screening.  No

## 2017-08-15 NOTE — PROGRESS NOTES
CC: Annual Wellness Visit      HPI:    She is a 79 y.o. female who presents for annual wellness visit. Patient is doing well. Started on wellbutrin 150mg for weight loss ( GLP1 inhibitor was too expensive). Patient lost 3 lbs. Feels it helps curb her appetite. Has noted increased dry mouth and attributes to oxybutynin which was started for her bladder. Denies vision changes. ROS:  10 systems reviewed and negative other HPI    Past Medical History:   Diagnosis Date    Arthritis     Hypertension        Current Outpatient Prescriptions on File Prior to Visit   Medication Sig Dispense Refill    ALPRAZolam (XANAX) 0.25 mg tablet Take 1 Tab by mouth daily as needed for Anxiety. 30 Tab 1    liraglutide (SAXENDA) 3 mg/0.5 mL (18 mg/3 mL) pen 0.5 mL by SubCUTAneous route daily. 1 Adjustable Dose Pre-filled Pen Syringe 4    naproxen sodium (ALEVE) 220 mg tablet Take 220 mg by mouth two (2) times daily (with meals).  benazepril (LOTENSIN) 20 mg tablet TAKE ONE TABLET BY MOUTH ONCE DAILY 30 Tab 5    omeprazole (PRILOSEC) 20 mg capsule Take 1 Cap by mouth daily. 30 Cap 5    furosemide (LASIX) 20 mg tablet Take 1 Tab by mouth daily. 30 Tab 5    diph,Pertuss,AC,,Tet Vac-PF (BOOSTRIX) 2.5-8-5 Lf-mcg suspension 0.5 mL by IntraMUSCular route PRIOR TO DISCHARGE for 1 dose. 0.5 mL 0    naproxen (NAPROSYN) 500 mg tablet   3     No current facility-administered medications on file prior to visit. Past Surgical History:   Procedure Laterality Date    HX CHOLECYSTECTOMY      HX HEENT      HX ORTHOPAEDIC      Bilateral Knee Replacement       Family History   Problem Relation Age of Onset    Cancer Mother      pancreatic cancer    Cancer Brother      Reviewed and no changes     Social History     Social History    Marital status:      Spouse name: N/A    Number of children: N/A    Years of education: N/A     Occupational History    Not on file.      Social History Main Topics    Smoking status: Never Smoker    Smokeless tobacco: Never Used    Alcohol use No    Drug use: No    Sexual activity: Not on file     Other Topics Concern    Not on file     Social History Narrative            Visit Vitals    /77 (BP 1 Location: Right arm, BP Patient Position: Sitting)    Pulse 64    Temp 97.7 °F (36.5 °C) (Oral)    Resp 18    Ht 4' 11\" (1.499 m)    Wt 248 lb 3.2 oz (112.6 kg)    SpO2 99%    BMI 50.13 kg/m2       Physical Examination:   General - Well appearing female  HEENT - PERRL, TM no erythema/opacification, normal nasal turbinates, oropharynx no erythema or exudate, MMM  Neck - supple, no bruits, no TMG, no LAD  Pulm - clear to auscultation bilaterally  Cardio - RRR, normal S1 S2, no murmur gallops or rubs  Abd - soft, nontender, no masses, no HSM  Extrem - no edema, +2 distal pulses  Psych - normal affect, appropriate mood    Lab Results   Component Value Date/Time    WBC 7.7 03/03/2016 11:52 AM    HGB 14.6 03/03/2016 11:52 AM    HCT 43.4 03/03/2016 11:52 AM    PLATELET 756 16/61/0526 11:52 AM    MCV 82 03/03/2016 11:52 AM     Lab Results   Component Value Date/Time    Sodium 142 08/15/2017 08:45 AM    Potassium 4.6 08/15/2017 08:45 AM    Chloride 103 08/15/2017 08:45 AM    CO2 23 08/15/2017 08:45 AM    Anion gap 6 01/30/2015 10:45 AM    Glucose 100 08/15/2017 08:45 AM    BUN 17 08/15/2017 08:45 AM    Creatinine 0.73 08/15/2017 08:45 AM    BUN/Creatinine ratio 23 08/15/2017 08:45 AM    GFR est AA 99 08/15/2017 08:45 AM    GFR est non-AA 85 08/15/2017 08:45 AM    Calcium 9.1 08/15/2017 08:45 AM     Lab Results   Component Value Date/Time    Cholesterol, total 206 07/18/2017 09:32 AM    HDL Cholesterol 57 07/18/2017 09:32 AM    LDL, calculated 134 07/18/2017 09:32 AM    VLDL, calculated 15 07/18/2017 09:32 AM    Triglyceride 77 07/18/2017 09:32 AM     Lab Results   Component Value Date/Time    TSH 1.600 03/03/2016 11:52 AM     No results found for: PSA, PSA2, PSAR1, PSA1, PSAR2, PSA3, PSAR3, [id number], [id number], PSALT  Lab Results   Component Value Date/Time    Hemoglobin A1c 5.6 07/18/2017 09:32 AM    Hemoglobin A1c (POC) 5.8 05/27/2016 10:00 AM     No results found for: Rian Latham [id number]    Lab Results   Component Value Date/Time    ALT (SGPT) 17 08/15/2017 08:45 AM    AST (SGOT) 15 08/15/2017 08:45 AM    Alk. phosphatase 83 08/15/2017 08:45 AM    Bilirubin, total 0.7 08/15/2017 08:45 AM           Assessment/Plan:    1. Routine general medical examination at a health care facility    2. Overactive bladder  - given dry mouth will stop oxybutinin  - mirabegron ER (MYRBETRIQ) 25 mg ER tablet; Take 1 Tab by mouth daily. Dispense: 30 Tab; Refill: 5    3. Dehydration: noted high BUN on previous BMP  - METABOLIC PANEL, COMPREHENSIVE    4. Age-related bone loss  - DEXA BONE DENSITY STUDY AXIAL; Future    5. Morbid obesity due to excess calories (HCC)  - buPROPion XL (WELLBUTRIN XL) 150 mg tablet; Take  - patient is loosing weight on bupropion discussed diet and exercise  -saxenda was too expensive    Follow-up Disposition:  Return in about 3 months (around 11/15/2017) for weight and blood pressure. Justin Hinton MD    This is a Subsequent Medicare Annual Wellness Visit providing Personalized Prevention Plan Services (PPPS) (Performed 12 months after initial AWV and PPPS )    I have reviewed the patient's medical history in detail and updated the computerized patient record. History     Past Medical History:   Diagnosis Date    Arthritis     Hypertension       Past Surgical History:   Procedure Laterality Date    HX CHOLECYSTECTOMY      HX HEENT      HX ORTHOPAEDIC      Bilateral Knee Replacement     Current Outpatient Prescriptions   Medication Sig Dispense Refill    mirabegron ER (MYRBETRIQ) 25 mg ER tablet Take 1 Tab by mouth daily. 30 Tab 5    buPROPion XL (WELLBUTRIN XL) 150 mg tablet Take 1 Tab by mouth every morning.  30 Tab 5    ALPRAZolam (XANAX) 0.25 mg tablet Take 1 Tab by mouth daily as needed for Anxiety. 30 Tab 1    liraglutide (SAXENDA) 3 mg/0.5 mL (18 mg/3 mL) pen 0.5 mL by SubCUTAneous route daily. 1 Adjustable Dose Pre-filled Pen Syringe 4    naproxen sodium (ALEVE) 220 mg tablet Take 220 mg by mouth two (2) times daily (with meals).  benazepril (LOTENSIN) 20 mg tablet TAKE ONE TABLET BY MOUTH ONCE DAILY 30 Tab 5    omeprazole (PRILOSEC) 20 mg capsule Take 1 Cap by mouth daily. 30 Cap 5    furosemide (LASIX) 20 mg tablet Take 1 Tab by mouth daily. 30 Tab 5    diph,Pertuss,AC,,Tet Vac-PF (BOOSTRIX) 2.5-8-5 Lf-mcg suspension 0.5 mL by IntraMUSCular route PRIOR TO DISCHARGE for 1 dose. 0.5 mL 0    naproxen (NAPROSYN) 500 mg tablet   3     Allergies   Allergen Reactions    Latex Rash    Adhesive Rash    Raspberry Rash    Tetracycline Hives     Family History   Problem Relation Age of Onset    Cancer Mother      pancreatic cancer    Cancer Brother      Social History   Substance Use Topics    Smoking status: Never Smoker    Smokeless tobacco: Never Used    Alcohol use No     Patient Active Problem List   Diagnosis Code    Obesity E66.9    Anxiety F41.9    History of bilateral knee replacement Z96.653    GERD (gastroesophageal reflux disease) K21.9    Stress incontinence N39.3    Varicose veins of both lower extremities I83.93    Essential hypertension I10       Depression Risk Factor Screening:     PHQ over the last two weeks 8/15/2017   Little interest or pleasure in doing things Not at all   Feeling down, depressed or hopeless Not at all   Total Score PHQ 2 0     Alcohol Risk Factor Screening: On any occasion during the past 3 months, have you had more than 3 drinks containing alcohol? No    Do you average more than 7 drinks per week? No        Functional Ability and Level of Safety:     Hearing Loss   none    Activities of Daily Living   Self-care.    Requires assistance with: no ADLs    Fall Risk     Fall Risk Assessment, last 12 mths 8/15/2017   Able to walk? Yes   Fall in past 12 months? No   Fall with injury? -   Number of falls in past 12 months -   Fall Risk Score -     Abuse Screen   Patient is not abused    Review of Systems   A comprehensive review of systems was negative except for that written in the HPI. Physical Examination     Evaluation of Cognitive Function:  Mood/affect:  neutral  Appearance: age appropriate  Family member/caregiver input: none  See above    Patient Care Team:  Whit Calderón MD as PCP - General (Internal Medicine)    Advice/Referrals/Counseling   Education and counseling provided:  Are appropriate based on today's review and evaluation  Advised to obtain flu shot    Assessment/Plan   reviewed diet, exercise and weight control.

## 2017-08-15 NOTE — PATIENT INSTRUCTIONS
08/15/2017  Medicare Part B Preventive Services Limitations Recommendation/Date completed if known Scheduled/ Next Due   Bone Mass Measurement  (age 72 & older, biennial) Requires diagnosis related to osteoporosis or estrogen deficiency. Biennial benefit unless patient has history of long-term glucocorticoid tx or baseline is needed because initial test was by other method Completed:      Recommended every 2 years DUE:Now ordererd   Cardiovascular Screening Blood Tests (every 5 years)  Total cholesterol, HDL, Triglycerides Order as a panel if possible Completed:july 2017      Recommended annually DUE:july 2018   Colorectal Cancer Screening  -Fecal occult blood test (annual)  -Flexible sigmoidoscopy (5y)  -Screening colonoscopy (10y)  -Barium Enema  Completed:july 2017        Recommended every 10 years DUE:july 2018   Counseling to Prevent Tobacco Use (up to 8 sessions per year)  - Counseling greater than 3 and up to 10 minutes  - Counseling greater than 10 minutes Patients must be asymptomatic of tobacco-related conditions to receive as preventive service     Diabetes Screening Tests (at least every 3 years, Medicare covers annually or at 6-month intervals for prediabetic patients)    Fasting blood sugar (FBS) or glucose tolerance test (GTT)       Patient must be diagnosed with one of the following:  -Hypertension, Dyslipidemia, obesity, previous impaired FBS or GTT  Or any two of the following: overweight, FH of diabetes, age ? 72, history of gestational diabetes, birth of baby weighing more than 9 pounds Completed:07/2017        Recommended annually DUE:07/2018   Diabetes Self-Management Training (DSMT) (no USPSTF recommendation) Requires referral by treating physician for patient with diabetes or renal disease. 10 hours of initial DSMT session of no less than 30 minutes each in a continuous 12-month period. 2 hours of follow-up DSMT in subsequent years.      Glaucoma Screening (no USPSTF recommendation) Diabetes mellitus, family history, , age 48 or over,  American, age 72 or over Completed:Jan 2017        Recommended annually 11 [facility] 2019   Human Immunodeficiency Virus (HIV) Screening (annually for increased risk patients)  HIV-1 and HIV-2 by EIA, KIARRA, rapid antibody test, or oral mucosa transudate Patient must be at increased risk for HIV infection per USPSTF guidelines or pregnant. Tests covered annually for patients at increased risk. Pregnant patients may receive up to 3 test during pregnancy. Medical Nutrition Therapy (MNT) (for diabetes or renal disease not recommended schedule) Requires referral by treating physician for patient with diabetes or renal disease. Can be provided in same year as diabetes self-management training (DSMT), and CMS recommends medical nutrition therapy take place after DSMT. Up to 3 hours for initial year and 2 hours in subsequent years. Prostate Cancer Screening (annually up to age 76)  - Digital rectal exam (SULEIMAN)  - Prostate specific antigen (PSA) Annually (age 48 or over), SULEIMAN not paid separately when covered E/M service is provided on same date Completed:        Recommended annually DUE:Not indicated   Seasonal Influenza Vaccination (annually)  Completed:       Recommended annually    DUE every Fall   Pneumococcal Vaccination (once after 72)  Completed:    A Shingles Vaccine is also recommended once in a lifetime after age 61. Hepatitis B Vaccinations (if medium/high risk) Medium/high risk factors:  End-stage renal disease,  Hemophiliacs who received Factor VIII or IX concentrates, Clients of institutions for the mentally retarded, Persons who live in the same house as a HepB virus carrier, Homosexual men, Illicit injectable drug abusers. Screening Mammography (biennial age 54-69)?  Annually (age 36 or over) Completed:     Recommended annually DUE:already has appointment   Screening Pap Tests and Pelvic Examination (up to age 79 and after 79 if unknown history or abnormal study last 10 years) Every 24 months except high risk Completed:        Recommended every 2 years DUE:not indicated anymore   Ultrasound Screening for Abdominal Aortic Aneurysm (AAA) (once) Patient must be referred through IPPE and not have had a screening for abdominal aortic aneurysm before under Medicare. Limited to patients who meet one of the following criteria:  - Men who are 73-68 years old and have smoked more than 100 cigarettes in their lifetime.  -Anyone with a FH of AAA  -Anyone recommended for screening by USPSTF Not covered by Medicare as preventive. Not indicated     Thanks for coming in today. It was nice to spend some time with you. If you have any questions about your visit today, please call [phone] and ask for [facility].     Take vitamin D 800 to  1000 units daily     Stop the oxybutynin and start myrbetriq

## 2017-08-16 LAB
ALBUMIN SERPL-MCNC: 3.8 G/DL (ref 3.6–4.8)
ALBUMIN/GLOB SERPL: 1.7 {RATIO} (ref 1.2–2.2)
ALP SERPL-CCNC: 83 IU/L (ref 39–117)
ALT SERPL-CCNC: 17 IU/L (ref 0–32)
AST SERPL-CCNC: 15 IU/L (ref 0–40)
BILIRUB SERPL-MCNC: 0.7 MG/DL (ref 0–1.2)
BUN SERPL-MCNC: 17 MG/DL (ref 8–27)
BUN/CREAT SERPL: 23 (ref 12–28)
CALCIUM SERPL-MCNC: 9.1 MG/DL (ref 8.7–10.3)
CHLORIDE SERPL-SCNC: 103 MMOL/L (ref 96–106)
CO2 SERPL-SCNC: 23 MMOL/L (ref 18–29)
CREAT SERPL-MCNC: 0.73 MG/DL (ref 0.57–1)
GLOBULIN SER CALC-MCNC: 2.3 G/DL (ref 1.5–4.5)
GLUCOSE SERPL-MCNC: 100 MG/DL (ref 65–99)
POTASSIUM SERPL-SCNC: 4.6 MMOL/L (ref 3.5–5.2)
PROT SERPL-MCNC: 6.1 G/DL (ref 6–8.5)
SODIUM SERPL-SCNC: 142 MMOL/L (ref 134–144)

## 2017-08-16 NOTE — PROGRESS NOTES
Spoke with patient , let patient know that patients  lab results were [facility] and liver function are normal dehydration resolved - continue to drink plenty of water             . Asked patient if any questions or concerns at the time. Pt stated no.

## 2017-08-31 ENCOUNTER — HOSPITAL ENCOUNTER (OUTPATIENT)
Dept: BONE DENSITY | Age: 67
Discharge: HOME | End: 2017-08-31
Attending: INTERNAL MEDICINE
Payer: MEDICARE

## 2017-08-31 ENCOUNTER — HOSPITAL ENCOUNTER (OUTPATIENT)
Dept: MAMMOGRAPHY | Age: 67
Discharge: HOME | End: 2017-08-31
Attending: INTERNAL MEDICINE
Payer: MEDICARE

## 2017-08-31 DIAGNOSIS — M85.80 AGE-RELATED BONE LOSS: ICD-10-CM

## 2017-08-31 DIAGNOSIS — Z12.31 SCREENING MAMMOGRAM, ENCOUNTER FOR: ICD-10-CM

## 2017-08-31 DIAGNOSIS — Z78.0 POSTMENOPAUSAL: ICD-10-CM

## 2017-08-31 PROCEDURE — 77080 DXA BONE DENSITY AXIAL: CPT

## 2017-08-31 PROCEDURE — 77067 SCR MAMMO BI INCL CAD: CPT

## 2017-08-31 NOTE — LETTER
9/7/2017 8:28 AM 
 
MsEdgar Edie Goddard [address] Danisha Goddard: 
 
Please find your most recent results below. Resulted Orders DEXA BONE DENSITY STUDY AXIAL Narrative Bone Mineral Density Indication:  screening for osteoporosis Age: 79 Sex: Female. Menopause status: postmenopausal. 
Hormone replacement therapy: None Number of falls in the past year:   1 Risk factors for osteoporosis:  Diuretic use, omeprazole Current medication for osteoporosis: None. Comparison: None. Technique: Imaging was performed on the [employer] QDR [address] [place] [address] meta-analysis fracture risk calculator (FRAX) analysis was 
performed for 10 year fracture risk probability assessment Excluded sites: 0 Findings: 
  
Femoral Neck:  left Bone mineral density (gm/cm2):? 0.704 
% of peak bone mass: 83 
% for age matched controls:? 105 T-score: -1.3 Z-score: 0.3 Total Hip: left Bone mineral density (gm/cm2):  0.801 
% of peak bone mass:   85 
% for age matched controls:  80 T-score:   -1.2 Z-score:  0.2 Lumbar Spine:  L1-4 Bone mineral density (gm/cm2):  0.950 
% of peak bone mass:  91  
% for age matched controls:  80 T-score:  -0.9 Z-score:  1.0 T score the distal one third left radius 0.3 Impression Impression: This patient is osteopenic using the World Health Organization criteria 10 year probability of major osteoporotic fracture:  7.3% 10 year probability of hip fracture:  0.6% Recommendations: 
Therapy recommendations need to be tailored to each individual patient. Please reconsider testing based on risk factors. Currently, Medicare will only 
reimburse for a central DXA examination every two years, unless the patient is 
on chronic glucocorticoid therapy.  
 
Note: Please note that reliable, valid comparisons cannot be made between 
studies which have been performed on machines from different manufacturers. If 
clinically warranted, a follow up study performed at this site, on the same 
unit, would allow the most sensitive assessment of change in bone mineral 
density. Bone density testing shows osteopenia. Recommend weight bearing exercise, like walking, fall prevention, and avoidance of heavy alcohol use.  
 Calcium 1200mg daily, most from foods, less from supplement.  Start Vitamin D3 2,000 iu once a day.  Recheck bone density test in 3 years.  
    
   
 
 
 
Please call me if you have any questions: [phone] Sincerely, [address] DEXA 1

## 2017-09-02 NOTE — PROGRESS NOTES
Bone density testing shows osteopenia. Recommend weight bearing exercise, like walking, fall prevention, and avoidance of heavy alcohol use. Calcium 1200mg daily, most from foods, less from supplement. Start Vitamin D3 2,000 iu once a day. Recheck bone density test in 3 years.

## 2017-09-07 ENCOUNTER — TELEPHONE (OUTPATIENT)
Dept: INTERNAL MEDICINE CLINIC | Age: 67
End: 2017-09-07

## 2017-09-07 NOTE — TELEPHONE ENCOUNTER
----- Message from Christin Peterson sent at 9/6/2017  4:34 PM EDT -----  Regarding: Dr. Gannon Mt  Patient would like the results of her mammogram and bone density test. Her number is [phone].         Copied/pasted from [place]

## 2017-09-07 NOTE — TELEPHONE ENCOUNTER
Spoke with patient and reviewed mammo and dexa results per Dr. Jocy Chapman. Patient verbalized understanding and states no questions at this time.

## 2017-09-07 NOTE — PROGRESS NOTES
Sent letter to the patient with results. Also spoke to the patient's  about the results. No questions at this time.

## 2017-10-15 DIAGNOSIS — N32.81 OVERACTIVE BLADDER: ICD-10-CM

## 2017-10-16 RX ORDER — MIRABEGRON 25 MG/1
TABLET, FILM COATED, EXTENDED RELEASE ORAL
Qty: 30 TAB | Refills: 5 | Status: SHIPPED | OUTPATIENT
Start: 2017-10-16 | End: 2018-02-16 | Stop reason: SDUPTHER

## 2017-10-17 DIAGNOSIS — I10 ESSENTIAL HYPERTENSION: ICD-10-CM

## 2017-10-17 RX ORDER — BENAZEPRIL HYDROCHLORIDE 20 MG/1
TABLET ORAL
Qty: 30 TAB | Refills: 5 | Status: SHIPPED | OUTPATIENT
Start: 2017-10-17 | End: 2018-04-16 | Stop reason: SDUPTHER

## 2017-10-25 ENCOUNTER — TELEPHONE (OUTPATIENT)
Dept: INTERNAL MEDICINE CLINIC | Age: 67
End: 2017-10-25

## 2017-10-25 NOTE — TELEPHONE ENCOUNTER
Called patient. Two patient identifiers verified. Patient complaint of rash all over body. Mainly on scalp and arms. Hx of stress related eczema. States she starts itching all over and breaks out in hives. Denies any change in soaps, detergents, environmental exposure but states she is under some stress at work. Has been using hydrocortisone cream with minimal relief. Offered appointment 10/26/17. Patient declined due to work schedule.    Appointment scheduled on 11/2/17 per patient request at 1545pm.

## 2017-10-25 NOTE — TELEPHONE ENCOUNTER
#[phone]  In meeting 11-12 today. Free after that. Pt has a rash that is very itchy and stress related. Her scalp is driving her crazy pt states. Pt states she has had this before so she knows it's stress related.   Please call as she would like to be seen on 10-31-17 as early as possible as she doesn't have to be into work until 10 am.

## 2017-11-02 ENCOUNTER — OFFICE VISIT (OUTPATIENT)
Dept: INTERNAL MEDICINE CLINIC | Age: 67
End: 2017-11-02

## 2017-11-02 VITALS
RESPIRATION RATE: 18 BRPM | HEIGHT: 59 IN | DIASTOLIC BLOOD PRESSURE: 64 MMHG | OXYGEN SATURATION: 95 % | SYSTOLIC BLOOD PRESSURE: 114 MMHG | WEIGHT: 254.2 LBS | BODY MASS INDEX: 51.24 KG/M2 | TEMPERATURE: 97.6 F | HEART RATE: 61 BPM

## 2017-11-02 DIAGNOSIS — L50.9 URTICARIA: Primary | ICD-10-CM

## 2017-11-02 RX ORDER — CETIRIZINE HCL 10 MG
10 TABLET ORAL 2 TIMES DAILY
Qty: 60 TAB | Refills: 0
Start: 2017-11-02 | End: 2018-02-24

## 2017-11-02 NOTE — MR AVS SNAPSHOT
Visit Information Date & Time Provider Department Dept. Phone Encounter #  
 11/2/2017  3:45 PM Rafael, [address] [place] [address],[address] [phone] [id number] Follow-up Instructions Return if symptoms worsen or fail to improve. Your Appointments 11/16/2017  8:15 AM  
Any with Ambar Armstrong MD  
[facility]) Appt Note: 3 month follow up appointment $0cp $0pb danielle 08/15/17  
 [facility] [address] [address]  
[address] [facility]  [address] Upcoming Health Maintenance Date Due FOBT Q 1 YEAR AGE 50-75 7/18/2018 MEDICARE YEARLY EXAM 8/16/2018 GLAUCOMA SCREENING Q2Y 1/10/2019 BREAST CANCER SCRN MAMMOGRAM 8/31/2019 DTaP/Tdap/Td series (2 - Td) 3/22/2027 Allergies as of 11/2/2017  Review Complete On: 11/2/2017 By: MD Rafael  
  
 Severity Noted Reaction Type Reactions Latex Medium 05/27/2016    Rash Adhesive Medium 08/25/2016    Rash Raspberry Medium 05/27/2016    Rash Tetracycline Low 02/29/2012   Systemic Hives Current Immunizations  Reviewed on 3/23/2017 Name Date Influenza High Dose Vaccine PF 8/26/2017 Pneumococcal Conjugate (PCV-13) 7/18/2017  9:30 AM  
 Pneumococcal Polysaccharide (PPSV-23) 5/27/2016 Tdap 3/22/2017 Not reviewed this visit You Were Diagnosed With   
  
 Codes Comments Urticaria    -  Primary ICD-10-CM: L50.9 ICD-9-CM: 708. 9 Vitals BP Pulse Temp Resp Height(growth percentile) Weight(growth percentile) 114/64 (BP 1 Location: Right arm, BP Patient Position: Sitting) 61 97.6 °F (36.4 °C) (Oral) 18 4' 11\" (1.499 m) 254 lb 3.2 oz (115.3 kg) LMP SpO2 BMI OB Status Smoking Status (LMP Unknown) 95% 51.34 kg/m2 Postmenopausal Never Smoker BMI and BSA Data Body Mass Index Body Surface Area  
 51.34 kg/m 2 2.19 m 2 Preferred Pharmacy Pharmacy Name Phone [facility] Genia Santiago [phone] Your Updated Medication List  
  
   
This list is accurate as of: 11/2/17  4:19 PM.  Always use your most recent med list.  
  
  
  
  
 ALEVE 220 mg tablet Generic drug:  naproxen sodium Take 220 mg by mouth two (2) times daily (with meals). ALPRAZolam 0.25 mg tablet Commonly known as:  [place] Take 1 Tab by mouth daily as needed for Anxiety. benazepril 20 mg tablet Commonly known as:  LOTENSIN  
TAKE ONE TABLET BY MOUTH ONCE DAILY  
  
 buPROPion  mg tablet Commonly known as:  Claudia Failing Take 1 Tab by mouth every morning. cetirizine 10 mg tablet Commonly known as:  ZYRTEC Take 1 Tab by mouth two (2) times a day. diph,Pertuss(AC),Tet Vac-PF 2.5-8-5 Lf-mcg suspension Commonly known as:  BOOSTRIX  
0.5 mL by IntraMUSCular route PRIOR TO DISCHARGE for 1 dose. furosemide 20 mg tablet Commonly known as:  LASIX Take 1 Tab by mouth daily. MYRBETRIQ 25 mg ER tablet Generic drug:  mirabegron ER  
TAKE ONE TABLET BY MOUTH ONCE DAILY  
  
 naproxen 500 mg tablet Commonly known as:  NAPROSYN  
  
 omeprazole 20 mg capsule Commonly known as:  PRILOSEC Take 1 Cap by mouth daily. Follow-up Instructions Return if symptoms worsen or fail to improve. Patient Instructions Take zyrtec - cetirizine start with one a day for 3 days if no improvement then can take twice a day until you see me. Use unscented soap such as Aveeno for eczema Tide free detergent 
 no perfumes Hives: Care Instructions Your Care Instructions Hives are raised, red, itchy patches of skin. They are also called wheals or welts. They usually have red borders and pale centers. Hives range in size from ¼ inch to 3 inches or more across. They may seem to move from place to place on the skin. Several hives may form a large area of raised, red skin. You can get hives after an insect sting, after taking medicine or eating certain foods, or because of infection or stress. Other causes include plants, things you breathe in, makeup, heat, cold, sunlight, and latex. You cannot spread hives to other people. Hives may last a few minutes or a few days, but a single spot may last less than 36 hours. Follow-up care is a key part of your treatment and safety. Be sure to make and go to all appointments, and call your doctor if you are having problems. It's also a good idea to know your test results and keep a list of the medicines you take. How can you care for yourself at home? · Avoid whatever you think may have caused your hives, such as a certain food or medicine. However, you may not know the cause. · Put a cool, wet towel on the area to relieve itching. · Take an over-the-counter antihistamine, such as diphenhydramine (Benadryl), cetirizine (Zyrtec), or loratadine (Claritin), to help stop the hives and calm the itching. Read and follow directions on the label. These medicines can make you feel sleepy. Do not drive while using them. · Stay away from strong soaps, detergents, and chemicals. These can make itching worse. When should you call for help? Call 911 anytime you think you may need emergency care. For example, call if: 
? · You have symptoms of a severe allergic reaction. These may include: 
¨ Sudden raised, red areas (hives) all over your body. ¨ Swelling of the throat, mouth, lips, or tongue. ¨ Trouble breathing. ¨ Passing out (losing consciousness). Or you may feel very lightheaded or suddenly feel weak, confused, or restless. ?Call your doctor now or seek immediate medical care if: 
? · You have symptoms of an allergic reaction, such as: ¨ A rash or hives (raised, red areas on the skin). ¨ Itching. ¨ Swelling. ¨ Belly pain, nausea, or vomiting. ? · You get hives after you start a new medicine. ? · Hives have not gone away after 24 hours. ? Watch closely for changes in your health, and be sure to contact your doctor if: 
? · You do not get better as expected. Where can you learn more? Go to http://eusebia-chuck.info/. Enter A333 in the search box to learn more about \"Hives: Care Instructions. \" Current as of: March 20, 2017 Content Version: 11.4 © 5762-0212 [employer]. Care instructions adapted under license by [employer] (which disclaims liability or warranty for this information). If you have questions about a medical condition or this instruction, always ask your healthcare professional. [address] any warranty or liability for your use of this information. Introducing [facility]! [facility] introduces [employer] patient portal. Now you can access parts of your medical record, email your doctor's office, and request medication refills online. 1. In your internet browser, go to [email or web address] 2. Click on the First Time User? Click Here link in the Sign In box. You will see the New Member Sign Up page. 3. Enter your [employer]ader[employer] Access Code exactly as it appears below. You will not need to use this code after youve completed the sign-up process. If you do not sign up before the expiration date, you must request a new code. · [employer]ader[employer] Access Code: -[email or web address] Expires: 1/31/2018  4:15 PM 
 
4. Enter the last four digits of your Social Security Number (xxxx) and Date of Birth (mm/dd/yyyy) as indicated and click Submit. You will be taken to the next sign-up page. 5. Create a Relationship Sciencet ID. This will be your [employer]aderTools login ID and cannot be changed, so think of one that is secure and easy to remember. 6. Create a [employer] password. You can change your password at any time. 7. Enter your Password Reset Question and Answer. This can be used at a later time if you forget your password. 8. Enter your e-mail address. You will receive e-mail notification when new information is available in [address] 9. Click Sign Up. You can now view and download portions of your medical record. 10. Click the Download Summary menu link to download a portable copy of your medical information. If you have questions, please visit the Frequently Asked Questions section of the [employer] website. Remember, [employer] is NOT to be used for urgent needs. For medical emergencies, dial 911. Now available from your iPhone and Android! Please provide this summary of care documentation to your next provider. Your primary care clinician is listed as LANRE  ADITI [address] [place] [address]. If you have any questions after today's visit, please call [phone].

## 2017-11-02 NOTE — PROGRESS NOTES
CC: Rash (chest, bilateral arms and legs x 2months) and Skin Problem    Acute visit  HPI:    She is a 79 y.o. female who presents for evaluation of   Increased stress at work at [employer]. Had a rough year there  Notes rash on arms and legs that looks like hives and come and go. Not taking any medication     Recalls previous hx of urticaria triggered by stress previously on atarax     Obesity: gained weight eating for comfort. Taking Wellbutrin and unsure if helpful     ROS:  12 systems reviewed and negative other than HPI     Past Medical History:   Diagnosis Date    Arthritis     Hypertension        Current Outpatient Prescriptions on File Prior to Visit   Medication Sig Dispense Refill    benazepril (LOTENSIN) 20 mg tablet TAKE ONE TABLET BY MOUTH ONCE DAILY 30 Tab 5    MYRBETRIQ 25 mg ER tablet TAKE ONE TABLET BY MOUTH ONCE DAILY 30 Tab 5    buPROPion XL (WELLBUTRIN XL) 150 mg tablet Take 1 Tab by mouth every morning. 30 Tab 5    ALPRAZolam (XANAX) 0.25 mg tablet Take 1 Tab by mouth daily as needed for Anxiety. 30 Tab 1    naproxen sodium (ALEVE) 220 mg tablet Take 220 mg by mouth two (2) times daily (with meals).  omeprazole (PRILOSEC) 20 mg capsule Take 1 Cap by mouth daily. 30 Cap 5    furosemide (LASIX) 20 mg tablet Take 1 Tab by mouth daily. 30 Tab 5    diph,Pertuss,AC,,Tet Vac-PF (BOOSTRIX) 2.5-8-5 Lf-mcg suspension 0.5 mL by IntraMUSCular route PRIOR TO DISCHARGE for 1 dose. 0.5 mL 0    liraglutide (SAXENDA) 3 mg/0.5 mL (18 mg/3 mL) pen 0.5 mL by SubCUTAneous route daily. 1 Adjustable Dose Pre-filled Pen Syringe 4    naproxen (NAPROSYN) 500 mg tablet   3     No current facility-administered medications on file prior to visit.         Past Surgical History:   Procedure Laterality Date    HX CHOLECYSTECTOMY      HX HEENT      HX ORTHOPAEDIC      Bilateral Knee Replacement       Family History   Problem Relation Age of Onset    Cancer Mother      pancreatic cancer    Cancer Brother Reviewed and no changes     Social History     Social History    Marital status:      Spouse name: N/A    Number of children: N/A    Years of education: N/A     Occupational History    Not on file.      Social History Main Topics    Smoking status: Never Smoker    Smokeless tobacco: Never Used    Alcohol use No    Drug use: No    Sexual activity: Yes     Partners: Female     Birth control/ protection: None     Other Topics Concern    Not on file     Social History Narrative            Visit Vitals    /64 (BP 1 Location: Right arm, BP Patient Position: Sitting)    Pulse 61    Temp 97.6 °F (36.4 °C) (Oral)    Resp 18    Ht 4' 11\" (1.499 m)    Wt 254 lb 3.2 oz (115.3 kg)    LMP  (LMP Unknown)    SpO2 95%    BMI 51.34 kg/m2       Physical Examination:   General - Well appearing female  HEENT - PERRL, TM no erythema/opacification, normal nasal turbinates, oropharynx no erythema or exudate, MMM  Neck - supple, no bruits, no TMG, no LAD  Pulm - clear to auscultation bilaterally  Cardio - RRR, normal S1 S2, no murmur gallops or rubs  Abd - soft, nontender, no masses, no HSM  Extrem - no edema, +2 distal pulses  Psych - normal affect, appropriate mood  Skin: left arm with hives red that are coalescing    Lab Results   Component Value Date/Time    WBC 7.7 03/03/2016 11:52 AM    HGB 14.6 03/03/2016 11:52 AM    HCT 43.4 03/03/2016 11:52 AM    PLATELET 634 53/85/1467 11:52 AM    MCV 82 03/03/2016 11:52 AM     Lab Results   Component Value Date/Time    Sodium 142 08/15/2017 08:45 AM    Potassium 4.6 08/15/2017 08:45 AM    Chloride 103 08/15/2017 08:45 AM    CO2 23 08/15/2017 08:45 AM    Anion gap 6 01/30/2015 10:45 AM    Glucose 100 08/15/2017 08:45 AM    BUN 17 08/15/2017 08:45 AM    Creatinine 0.73 08/15/2017 08:45 AM    BUN/Creatinine ratio 23 08/15/2017 08:45 AM    GFR est AA 99 08/15/2017 08:45 AM    GFR est non-AA 85 08/15/2017 08:45 AM    Calcium 9.1 08/15/2017 08:45 AM     Lab Results Component Value Date/Time    Cholesterol, total 206 07/18/2017 09:32 AM    HDL Cholesterol 57 07/18/2017 09:32 AM    LDL, calculated 134 07/18/2017 09:32 AM    VLDL, calculated 15 07/18/2017 09:32 AM    Triglyceride 77 07/18/2017 09:32 AM     Lab Results   Component Value Date/Time    TSH 1.600 03/03/2016 11:52 AM     No results found for: PSA, PSA2, Hay Whitmorel, [id number], [id number], PSALT  Lab Results   Component Value Date/Time    Hemoglobin A1c 5.6 07/18/2017 09:32 AM    Hemoglobin A1c (POC) 5.8 05/27/2016 10:00 AM     No results found for: Magdalena Garrett [id number]    Lab Results   Component Value Date/Time    ALT (SGPT) 17 08/15/2017 08:45 AM    AST (SGOT) 15 08/15/2017 08:45 AM    Alk. phosphatase 83 08/15/2017 08:45 AM    Bilirubin, total 0.7 08/15/2017 08:45 AM           Assessment/Plan:    1.  Urticaria  - suspect stress related  - recurrent  - take cetirizine once a day if not effective - increase to BID  - follow up in 2 weeks     Obesity: has follow up in 2 weeks - has gained weight stress eating         Follow-up Disposition: Not on Ilana Shabazz MD

## 2017-11-02 NOTE — PROGRESS NOTES
Chief Complaint   Patient presents with    Rash     chest, bilateral arms and legs x 2months    Skin Problem     Visit Vitals    /64 (BP 1 Location: Right arm, BP Patient Position: Sitting)    Pulse 61    Temp 97.6 °F (36.4 °C) (Oral)    Resp 18    Ht 4' 11\" (1.499 m)    Wt 254 lb 3.2 oz (115.3 kg)    LMP  (LMP Unknown)    SpO2 95%    BMI 51.34 kg/m2     Reviewed record In preparation for visit and have obtained necessary documentation    1. Have you been to the ER, urgent care clinic since your last visit? Hospitalized since your last visit? NO    2. Have you seen or consulted any other health care providers outside of the [address] [place] [address] since your last visit? Include any pap smears or colon screening. NO    Patient does not have advance directive on file.

## 2017-11-18 ENCOUNTER — HOSPITAL ENCOUNTER (EMERGENCY)
Age: 67
Discharge: HOME | End: 2017-11-18
Attending: EMERGENCY MEDICINE
Payer: MEDICARE

## 2017-11-18 ENCOUNTER — APPOINTMENT (OUTPATIENT)
Dept: GENERAL RADIOLOGY | Age: 67
End: 2017-11-18
Attending: PHYSICIAN ASSISTANT
Payer: MEDICARE

## 2017-11-18 VITALS
DIASTOLIC BLOOD PRESSURE: 80 MMHG | OXYGEN SATURATION: 98 % | RESPIRATION RATE: 16 BRPM | HEIGHT: 59 IN | SYSTOLIC BLOOD PRESSURE: 170 MMHG | BODY MASS INDEX: 51.6 KG/M2 | TEMPERATURE: 97.7 F | WEIGHT: 255.95 LBS

## 2017-11-18 DIAGNOSIS — R07.89 CHEST WALL PAIN: Primary | ICD-10-CM

## 2017-11-18 PROCEDURE — 74011250637 HC RX REV CODE- 250/637: Performed by: PHYSICIAN ASSISTANT

## 2017-11-18 PROCEDURE — 71101 X-RAY EXAM UNILAT RIBS/CHEST: CPT

## 2017-11-18 PROCEDURE — 99283 EMERGENCY DEPT VISIT LOW MDM: CPT

## 2017-11-18 RX ORDER — OXYCODONE AND ACETAMINOPHEN 5; 325 MG/1; MG/1
1 TABLET ORAL
Qty: 10 TAB | Refills: 0 | Status: SHIPPED | OUTPATIENT
Start: 2017-11-18 | End: 2018-02-24

## 2017-11-18 RX ORDER — OXYCODONE AND ACETAMINOPHEN 5; 325 MG/1; MG/1
1 TABLET ORAL
Status: COMPLETED | OUTPATIENT
Start: 2017-11-18 | End: 2017-11-18

## 2017-11-18 RX ORDER — CARISOPRODOL 350 MG/1
350 TABLET ORAL 4 TIMES DAILY
Qty: 12 TAB | Refills: 0 | Status: SHIPPED | OUTPATIENT
Start: 2017-11-18 | End: 2018-06-25 | Stop reason: ALTCHOICE

## 2017-11-18 RX ORDER — DIAZEPAM 5 MG/1
5 TABLET ORAL
Status: COMPLETED | OUTPATIENT
Start: 2017-11-18 | End: 2017-11-18

## 2017-11-18 RX ADMIN — DIAZEPAM 5 MG: 5 TABLET ORAL at 07:50

## 2017-11-18 RX ADMIN — OXYCODONE HYDROCHLORIDE AND ACETAMINOPHEN 1 TABLET: 5; 325 TABLET ORAL at 07:50

## 2017-11-18 NOTE — LETTER
[address] [facility] EMERGENCY DEPT 
[address] [place] [address] [address] [id number] [phone] Work/School Note Date: 11/18/2017 To Whom It May concern: 
 
Christin Le was seen and treated today in the emergency room by the following provider(s): 
Attending Provider: David Saavedra MD 
Physician Assistant: Mya Ac. Christin Le No work 48 hours. Sincerely, PATRIC Ac

## 2017-11-18 NOTE — DISCHARGE INSTRUCTIONS

## 2017-11-18 NOTE — ED PROVIDER NOTES
[facility]  EMERGENCY DEPARTMENT HISTORY AND PHYSICAL EXAM         Date of Service: 11/18/2017   Patient Name: Eliza Thakkar   YOB: 1950  Medical Record Number: [id number]    History of Presenting Illness     Chief Complaint   Patient presents with    Fall     slipped and fell in [employer] last night.  Rib Injury     entire right side of body especially rib area right side        History Provided By:  patient    Additional History:   Eliza Thakkar is a 79 y.o. female with PMhx significant for HTN / arthritis who presents ambulatory to the ED with cc of sudden onset of gradually worsening R rib pain x last night secondary to a ground level fall. Pt complains of associated generalized myalgia. She states that she was at the grocery store last night when she tripped and fell. Pt notes that her rib pain is exacerbated with deep inspiration. She denies any head injury or LOC at the time of her fall. Pt also specifically denies nausea, vomiting, fever, chills, SOB, bruising, or hemoptysis. Social Hx: - Tobacco, - EtOH, - Illicit Drugs    There are no other complaints, changes or physical findings at this time. Primary Care Provider: Sammie Koo MD     Past History     Past Medical History:   Past Medical History:   Diagnosis Date    Arthritis     Hypertension         Past Surgical History:   Past Surgical History:   Procedure Laterality Date    HX CHOLECYSTECTOMY      HX HEENT      HX ORTHOPAEDIC      Bilateral Knee Replacement        Family History:   Family History   Problem Relation Age of Onset    Cancer Mother      pancreatic cancer    Cancer Brother         Social History:   Social History   Substance Use Topics    Smoking status: Never Smoker    Smokeless tobacco: Never Used    Alcohol use No        Allergies:    Allergies   Allergen Reactions    Latex Rash    Adhesive Rash    Raspberry Rash    Tetracycline Hives        Review of Systems   Review of Systems   Constitutional: Negative. Negative for activity change, appetite change, chills, fatigue, fever and unexpected weight change. HENT: Negative. Negative for congestion, hearing loss, rhinorrhea, sneezing and voice change. Eyes: Negative. Negative for pain and visual disturbance. Respiratory: Negative. Negative for apnea, cough, choking, chest tightness and shortness of breath. Negative for hemoptysis. Cardiovascular: Negative. Negative for chest pain and palpitations. Gastrointestinal: Negative. Negative for abdominal distention, abdominal pain, blood in stool, diarrhea, nausea and vomiting. Genitourinary: Negative. Negative for difficulty urinating, flank pain, frequency and urgency. No discharge   Musculoskeletal: Positive for myalgias. Negative for arthralgias, back pain and neck stiffness. Positive for R rib pain. Skin: Negative. Negative for color change and rash. Negative for ecchymosis. Neurological: Negative. Negative for dizziness, seizures, syncope, speech difficulty, weakness, numbness and headaches. Hematological: Negative for adenopathy. Psychiatric/Behavioral: Negative. Negative for agitation, behavioral problems, dysphoric mood and suicidal ideas. The patient is not nervous/anxious. Physical Exam  Physical Exam   Constitutional: She is oriented to person, place, and time. She appears well-developed and well-nourished. No distress. HENT:   Head: Normocephalic and atraumatic. Right Ear: External ear normal.   Left Ear: External ear normal.   Nose: Nose normal.   Mouth/Throat: Oropharynx is clear and moist. No oropharyngeal exudate. Eyes: Conjunctivae and EOM are normal. Pupils are equal, round, and reactive to light. Right eye exhibits no discharge. Left eye exhibits no discharge. No scleral icterus. Neck: Normal range of motion. Neck supple. No JVD present. No tracheal deviation present. Cardiovascular: Normal rate, regular rhythm, normal heart sounds and intact distal pulses. Exam reveals no gallop and no friction rub. No murmur heard. Pulmonary/Chest: Effort normal and breath sounds normal. No respiratory distress. She has no wheezes. She has no rales. She exhibits no tenderness. Abdominal: Soft. Bowel sounds are normal. She exhibits no distension and no mass. There is no tenderness. There is no rebound and no guarding. Musculoskeletal: Normal range of motion. She exhibits no edema or tenderness. No gross deformity. No ecchymosis. TTP of anterior lateral R rib. Lymphadenopathy:     She has no cervical adenopathy. Neurological: She is alert and oriented to person, place, and time. She has normal reflexes. No cranial nerve deficit. She exhibits normal muscle tone. Coordination normal.   Skin: Skin is warm and dry. She is not diaphoretic. Psychiatric: She has a normal mood and affect. Her behavior is normal. Judgment and thought content normal.   Nursing note and vitals reviewed. Medical Decision Making   I am the first provider for this patient. I reviewed the vital signs, available nursing notes, past medical history, past surgical history, family history and social history. Old Medical Records: Old medical records. Nursing notes. Provider Notes:   DDx sprain, strain, fracture     ED Course:  7:40 AM  Initial assessment performed. The patients presenting problems have been discussed, and they are in agreement with the care plan formulated and outlined with them. I have encouraged them to ask questions as they arise throughout their visit. Diagnostic Study Results   Labs -    No results found for this or any previous visit (from the past 12 hour(s)).     Radiologic Studies -  The following have been ordered and reviewed:  XR RIBS RT W PA CXR MIN 3 V   Final Result        Study Result      CLINICAL HISTORY: Status post fall last night with acute right rib pain     Comparison 4/20/2015     PA view of the chest and 3 oblique views of the right ribs demonstrate normal  heart size. There is no acute process in the lung fields. No fracture or  pneumothorax.     IMPRESSION  IMPRESSION: No acute fracture or process. Vital Signs-Reviewed the patient's vital signs. Patient Vitals for the past 12 hrs:   Temp Resp BP SpO2   11/18/17 0743 97.7 °F (36.5 °C) 16 170/80 98 %       Medications Given in the ED:  Medications   oxyCODONE-acetaminophen (PERCOCET) 5-325 mg per tablet 1 Tab (1 Tab Oral Given 11/18/17 0750)   diazePAM (VALIUM) tablet 5 mg (5 mg Oral Given 11/18/17 0750)       Diagnosis:  Clinical Impression:   1. Chest wall pain         Plan:  1:   Follow-up Information     Follow up With Details Comments [id number] [address] [place], [place] In 2 days As needed [address] [facility]  [phone]            2:   Current Discharge Medication List      START taking these medications    Details   oxyCODONE-acetaminophen (PERCOCET) 5-325 mg per tablet Take 1 Tab by mouth every six (6) hours as needed for Pain. Max Daily Amount: 4 Tabs. Qty: 10 Tab, Refills: 0      carisoprodol (SOMA) 350 mg tablet Take 1 Tab by mouth four (4) times daily. Max Daily Amount: 1,400 mg. Qty: 12 Tab, Refills: 0           Return to ED if worse. Disposition:    DISCHARGE NOTE  8:36 AM  The patient has been re-evaluated and is ready for discharge. Reviewed available results with patient. Counseled pt on diagnosis and care plan. Pt has expressed understanding, and all questions have been answered. Pt agrees with plan and agrees to F/U as recommended, or return to the ED if their sxs worsen. Discharge instructions have been provided and explained to the pt, along with reasons to return to the ED. This note is prepared by Ehsan Alvarez, acting as Scribe for [employer].     CARSON Crump: The scribe's documentation has been prepared under my direction and personally reviewed by me in its entirety. I confirm that the note above accurately reflects all work, treatment, procedures, and medical decision making performed by me.

## 2017-11-18 NOTE — ED NOTES
PATRIC GRAMAJO reviewed discharge instructions with the patient. Spouse here and states that he will drive patient home.  Patient ambulatory out of treatment area with steady gait with spouse

## 2017-11-20 ENCOUNTER — OFFICE VISIT (OUTPATIENT)
Dept: INTERNAL MEDICINE CLINIC | Age: 67
End: 2017-11-20

## 2017-11-20 ENCOUNTER — PATIENT OUTREACH (OUTPATIENT)
Dept: INTERNAL MEDICINE CLINIC | Age: 67
End: 2017-11-20

## 2017-11-20 VITALS
WEIGHT: 257.6 LBS | BODY MASS INDEX: 51.93 KG/M2 | HEIGHT: 59 IN | SYSTOLIC BLOOD PRESSURE: 179 MMHG | DIASTOLIC BLOOD PRESSURE: 91 MMHG | OXYGEN SATURATION: 97 % | RESPIRATION RATE: 16 BRPM | HEART RATE: 60 BPM | TEMPERATURE: 97.6 F

## 2017-11-20 DIAGNOSIS — R10.9 RIGHT SIDED ABDOMINAL PAIN: ICD-10-CM

## 2017-11-20 DIAGNOSIS — M79.674 PAIN IN TOE OF RIGHT FOOT: Primary | ICD-10-CM

## 2017-11-20 DIAGNOSIS — M25.511 ACUTE PAIN OF RIGHT SHOULDER: ICD-10-CM

## 2017-11-20 DIAGNOSIS — M79.674 GREAT TOE PAIN, RIGHT: ICD-10-CM

## 2017-11-20 DIAGNOSIS — M25.562 ACUTE PAIN OF LEFT KNEE: Primary | ICD-10-CM

## 2017-11-20 DIAGNOSIS — M25.562 LEFT KNEE PAIN, UNSPECIFIED CHRONICITY: ICD-10-CM

## 2017-11-20 NOTE — PROGRESS NOTES
[address] [place] [facility]      Patient referred to this NN via Referral from [facility]. - Patient with ED visit to [facility] on 11/18/17 with diagnosis of Chest wall pain. - Per notes, patient was discharged from ED to home. Patient attended follow up appointment today with Dr. Hardik Perez. Next scheduled appointment 11/30/17. Will follow up as needed.

## 2017-11-20 NOTE — LETTER
NOTIFICATION RETURN TO WORK / SCHOOL 
 
11/20/2017 10:16 AM 
 
Ms. Zenaida Solorio [address] To Whom It May Concern: 
 
Zenaida Solorio is currently under the care of [facility]. She will return to work/school on: 11/25/17. If there are questions or concerns please have the patient contact our office.  
 
 
 
Sincerely, 
 
 
Kelly Shahid MD

## 2017-11-20 NOTE — MR AVS SNAPSHOT
Visit Information Date & Time Provider Department Dept. Phone Encounter #  
 11/20/2017  9:30 AM Mary Swift, [address] [place] [address],[address] [phone] [id number] Follow-up Instructions Return if symptoms worsen or fail to improve. Follow-up and Disposition History Your Appointments 11/30/2017  9:30 AM  
Any with Ambar Alvarado MD  
[facility]) Appt Note: 3 month follow up appointment $0cp $0pb danielle 08/15/17; [facility] [address] [address]  
[address] [facility] [address] [place] [address] Upcoming Health Maintenance Date Due FOBT Q 1 YEAR AGE 50-75 7/18/2018 MEDICARE YEARLY EXAM 8/16/2018 GLAUCOMA SCREENING Q2Y 1/10/2019 BREAST CANCER SCRN MAMMOGRAM 8/31/2019 DTaP/Tdap/Td series (2 - Td) 3/22/2027 Allergies as of 11/20/2017  Review Complete On: 11/20/2017 By: Mary Swift MD  
  
 Severity Noted Reaction Type Reactions Latex Medium 05/27/2016    Rash Adhesive Medium 08/25/2016    Rash Raspberry Medium 05/27/2016    Rash Tetracycline Low 02/29/2012   Systemic Hives Current Immunizations  Reviewed on 3/23/2017 Name Date Influenza High Dose Vaccine PF 8/26/2017 Pneumococcal Conjugate (PCV-13) 7/18/2017  9:30 AM  
 Pneumococcal Polysaccharide (PPSV-23) 5/27/2016 Tdap 3/22/2017 Not reviewed this visit You Were Diagnosed With   
  
 Codes Comments Acute pain of left knee    -  Primary ICD-10-CM: H46.849 ICD-9-CM: 719.46 Great toe pain, right     ICD-10-CM: O73.210 ICD-9-CM: 729.5 Right sided abdominal pain     ICD-10-CM: R10.9 ICD-9-CM: 789.09 Acute pain of right shoulder     ICD-10-CM: M25.511 ICD-9-CM: 719.41 Vitals BP Pulse Temp Resp Height(growth percentile) Weight(growth percentile)  (!) 179/91 (BP 1 Location: Left arm, BP Patient Position: Sitting) 60 97.6 °F (36.4 °C) (Oral) 16 4' 11\" (1.499 m) 257 lb 9.6 oz (116.8 kg) LMP SpO2 BMI OB Status Smoking Status (LMP Unknown) 97% 52.03 kg/m2 Postmenopausal Never Smoker Vitals History BMI and BSA Data Body Mass Index Body Surface Area 52.03 kg/m 2 2.21 m 2 Preferred Pharmacy Pharmacy Name Phone [facility] Genia Santiago [phone] Your Updated Medication List  
  
   
This list is accurate as of: 11/20/17 10:21 AM.  Always use your most recent med list.  
  
  
  
  
 ALPRAZolam 0.25 mg tablet Commonly known as:  Star Carp Take 1 Tab by mouth daily as needed for Anxiety. benazepril 20 mg tablet Commonly known as:  LOTENSIN  
TAKE ONE TABLET BY MOUTH ONCE DAILY  
  
 buPROPion  mg tablet Commonly known as:  Larina Bannister Take 1 Tab by mouth every morning. carisoprodol 350 mg tablet Commonly known as:  SOMA Take 1 Tab by mouth four (4) times daily. Max Daily Amount: 1,400 mg.  
  
 cetirizine 10 mg tablet Commonly known as:  ZYRTEC Take 1 Tab by mouth two (2) times a day. furosemide 20 mg tablet Commonly known as:  LASIX Take 1 Tab by mouth daily. MYRBETRIQ 25 mg ER tablet Generic drug:  mirabegron ER  
TAKE ONE TABLET BY MOUTH ONCE DAILY  
  
 omeprazole 20 mg capsule Commonly known as:  PRILOSEC Take 1 Cap by mouth daily. oxyCODONE-acetaminophen 5-325 mg per tablet Commonly known as:  PERCOCET Take 1 Tab by mouth every six (6) hours as needed for Pain. Max Daily Amount: 4 Tabs. Follow-up Instructions Return if symptoms worsen or fail to improve. To-Do List   
 11/20/2017 Imaging:  XR GREAT TOE RT MIN 2 V   
  
 11/20/2017 Imaging:  XR KNEE LT 3 V Introducing [facility]! [place] [employer] introduces [place] patient portal. Now you can access parts of your medical record, email your doctor's office, and request medication refills online. 1. In your internet browser, go to [email or web address] 2. Click on the First Time User? Click Here link in the Sign In box. You will see the New Member Sign Up page. 3. Enter your [employer] Access Code exactly as it appears below. You will not need to use this code after youve completed the sign-up process. If you do not sign up before the expiration date, you must request a new code. · [employer] Access Code: -[email or web address] Expires: 1/31/2018  3:15 PM 
 
4. Enter the last four digits of your Social Security Number (xxxx) and Date of Birth (mm/dd/yyyy) as indicated and click Submit. You will be taken to the next sign-up page. 5. Create a [employer] ID. This will be your [employer] login ID and cannot be changed, so think of one that is secure and easy to remember. 6. Create a [employer] password. You can change your password at any time. 7. Enter your Password Reset Question and Answer. This can be used at a later time if you forget your password. 8. Enter your e-mail address. You will receive e-mail notification when new information is available in [address] 9. Click Sign Up. You can now view and download portions of your medical record. 10. Click the Download Summary menu link to download a portable copy of your medical information. If you have questions, please visit the Frequently Asked Questions section of the [employer] website. Remember, [employer] is NOT to be used for urgent needs. For medical emergencies, dial 911. Now available from your iPhone and Android! Please provide this summary of care documentation to your next provider. Your primary care clinician is listed as LANRE PENNINGTON [address] [place] [address] Ave. If you have any questions after today's visit, please call [phone].

## 2017-11-20 NOTE — PROGRESS NOTES
1. Have you been to the ER, urgent care clinic since your last visit? Hospitalized since your last visit? [facility] ER  2. Have you seen or consulted any other health care providers outside of the [address] [place] [address] since your last visit? Include any pap smears or colon screening.  no

## 2017-11-20 NOTE — PROGRESS NOTES
SUBJECTIVE  Ms. Virgilio Salazar presents today acutely for     Chief Complaint   Patient presents with   [facility] Follow Up     pt here today for hosp f.u from [facility] ; fall- pt has a bruised R toe and pain in R rib; today pt rates pain in R rib 10/10 when moving , pt states that her toe is hard and that she also notice a bruise on L knee        She was at [employer] and tripped. Went to ER:   Virgilio Salazar is a 79 y.o. female with PMhx significant for HTN / arthritis who presents ambulatory to the ED with cc of sudden onset of gradually worsening R rib pain x last night secondary to a ground level fall. Pt complains of associated generalized myalgia. She states that she was at the grocery store last night when she tripped and fell. Pt notes that her rib pain is exacerbated with deep inspiration. She denies any head injury or LOC at the time of her fall. Pt also specifically denies nausea, vomiting, fever, chills, SOB, bruising, or hemoptysis. Xray ribs were normal.  Has been given Rx for percocet. Now, she \"hurts all over\":  R side / upper abdomen \"hurts if I breathe too hard, get up, or do anything. \"  L knee hurts and \"it's black and blue. \"  L MTP red and swollen. R shoulder starting to hurt. OBJECTIVE  Visit Vitals    BP (!) 179/91 (BP 1 Location: Left arm, BP Patient Position: Sitting)    Pulse 60    Temp 97.6 °F (36.4 °C) (Oral)    Resp 16    Ht 4' 11\" (1.499 m)    Wt 257 lb 9.6 oz (116.8 kg)    LMP  (LMP Unknown)    SpO2 97%    BMI 52.03 kg/m2     Gen: Pleasant 79 y.o.  female in NAD.   HEENT: PERRLA. EOMI. OP moist and pink.  Neck: Supple.  No LAD.  HEART: RRR, No M/G/R.   LUNGS: CTAB No W/R.   ABDOMEN: S, NT, ND, BS+.   EXTREMITIES: Warm. No C/C/E. MUSCULOSKELETAL: Tender R chest wall and R side. L knee bruising noted. R great toe MTP warm, red, swollen. SKIN: Warm. Dry. No rashes or other lesions noted. ASSESSMENT / PLAN:  Fall with. .. ICD-10-CM ICD-9-CM    1.  Acute pain of left knee M25.562 719.46 XR KNEE LT 3 V   2. Great toe pain, right M79.674 729.5 XR GREAT TOE RT MIN 2 V   3. Right sided abdominal pain R10.9 789.09    4. Acute pain of right shoulder M25.511 719.41        Continue analgesics. Work note given. I have reviewed with the patient details of the assessment and plan and all questions were answered. Relevant patient education was performed. Follow-up Disposition:  Return if symptoms worsen or fail to improve.

## 2017-11-21 ENCOUNTER — TELEPHONE (OUTPATIENT)
Dept: INTERNAL MEDICINE CLINIC | Age: 67
End: 2017-11-21

## 2017-11-21 NOTE — TELEPHONE ENCOUNTER
Called patient. Two patient identifiers verified. Advised new letter was ready for  at . Unable to email to external source. Patient verbalized understanding.

## 2017-11-21 NOTE — TELEPHONE ENCOUNTER
Pt would like a note emailed to her that allows her to return to work on Friday. Pt is feeling better. Please email the note to [email or web address]. Pt would like a call back to confirm that the note has been emailed. Pt can be reached at [phone].        Message received & copied from [facility] [place]

## 2017-11-21 NOTE — LETTER
NOTIFICATION RETURN TO WORK / SCHOOL 
 
11/20/2017 10:16 AM 
 
Ms. Sid Lugo [address] To Whom It May Concern: 
 
Sid Lugo is currently under the care of [facility]. She will return to work/school on: 11/24/17. If there are questions or concerns please have the patient contact our office.  
 
 
 
Sincerely, 
 
 
Niko Corona MD

## 2017-11-22 ENCOUNTER — TELEPHONE (OUTPATIENT)
Dept: INTERNAL MEDICINE CLINIC | Age: 67
End: 2017-11-22

## 2017-11-22 NOTE — TELEPHONE ENCOUNTER
Called patient. Two patient identifiers verified. Advised letter ready for pickup at . Patient verbalized understanding and states no further questions at this time.

## 2017-11-22 NOTE — TELEPHONE ENCOUNTER
----- Message from Rolando Garrett sent at 11/22/2017  8:36 AM EST -----  Regarding: Dr. Idalmis Lo  Pt would like to know if letter with permission to return  to work is ready for ? Her  will be able to pick it up tomorrow 11/22 between 11am-11:30 am. The best contact number is [phone].          Message copied/pasted from [facility]

## 2017-11-30 ENCOUNTER — OFFICE VISIT (OUTPATIENT)
Dept: INTERNAL MEDICINE CLINIC | Age: 67
End: 2017-11-30

## 2017-11-30 VITALS
SYSTOLIC BLOOD PRESSURE: 172 MMHG | WEIGHT: 262.2 LBS | DIASTOLIC BLOOD PRESSURE: 81 MMHG | BODY MASS INDEX: 52.86 KG/M2 | TEMPERATURE: 97.7 F | HEART RATE: 71 BPM | RESPIRATION RATE: 18 BRPM | HEIGHT: 59 IN | OXYGEN SATURATION: 97 %

## 2017-11-30 DIAGNOSIS — I10 ESSENTIAL HYPERTENSION: Primary | ICD-10-CM

## 2017-11-30 DIAGNOSIS — R07.89 OTHER CHEST PAIN: ICD-10-CM

## 2017-11-30 PROBLEM — E66.01 OBESITY, MORBID (HCC): Status: ACTIVE | Noted: 2017-11-30

## 2017-11-30 RX ORDER — BENAZEPRIL HYDROCHLORIDE 10 MG/1
10 TABLET ORAL DAILY
Qty: 30 TAB | Refills: 5 | Status: SHIPPED | OUTPATIENT
Start: 2017-11-30 | End: 2018-04-04 | Stop reason: SDUPTHER

## 2017-11-30 NOTE — MR AVS SNAPSHOT
Visit Information Date & Time Provider Department Dept. Phone Encounter #  
 11/30/2017  9:30 AM Trevor Nuñez, [address] [place] [address],[address] [phone] [id number] Follow-up Instructions Return in about 3 months (around 2/28/2018) for Hypertension . Upcoming Health Maintenance Date Due FOBT Q 1 YEAR AGE 50-75 7/18/2018 MEDICARE YEARLY EXAM 8/16/2018 GLAUCOMA SCREENING Q2Y 1/10/2019 BREAST CANCER SCRN MAMMOGRAM 8/31/2019 DTaP/Tdap/Td series (2 - Td) 3/22/2027 Allergies as of 11/30/2017  Review Complete On: 11/30/2017 By: Marco A Saba Severity Noted Reaction Type Reactions Latex Medium 05/27/2016    Rash Adhesive Medium 08/25/2016    Rash Raspberry Medium 05/27/2016    Rash Tetracycline Low 02/29/2012   Systemic Hives Current Immunizations  Reviewed on 3/23/2017 Name Date Influenza High Dose Vaccine PF 8/26/2017 Pneumococcal Conjugate (PCV-13) 7/18/2017  9:30 AM  
 Pneumococcal Polysaccharide (PPSV-23) 5/27/2016 Tdap 3/22/2017 Not reviewed this visit You Were Diagnosed With   
  
 Codes Comments Essential hypertension    -  Primary ICD-10-CM: I10 
ICD-9-CM: 401.9 Other chest pain     ICD-10-CM: R07.89 ICD-9-CM: 786.59 Vitals BP Pulse Temp Resp Height(growth percentile) Weight(growth percentile) 172/81 (BP 1 Location: Right arm, BP Patient Position: Sitting) 71 97.7 °F (36.5 °C) (Oral) 18 4' 11\" (1.499 m) 262 lb 3.2 oz (118.9 kg) LMP SpO2 BMI OB Status Smoking Status (LMP Unknown) 97% 52.96 kg/m2 Postmenopausal Never Smoker BMI and BSA Data Body Mass Index Body Surface Area 52.96 kg/m 2 2.22 m 2 Preferred Pharmacy Pharmacy Name Phone [facility] Genia Santiago [phone] Your Updated Medication List  
  
   
This list is accurate as of: 11/30/17 10:22 AM.  Always use your most recent med list.  
  
  
  
  
 ALPRAZolam 0.25 mg tablet Commonly known as:  Raquel Mariely Take 1 Tab by mouth daily as needed for Anxiety. * benazepril 20 mg tablet Commonly known as:  LOTENSIN  
TAKE ONE TABLET BY MOUTH ONCE DAILY * benazepril 10 mg tablet Commonly known as:  LOTENSIN Take 1 Tab by mouth daily. buPROPion  mg tablet Commonly known as:  Kathleaeliot LaraGunn Take 1 Tab by mouth every morning. carisoprodol 350 mg tablet Commonly known as:  SOMA Take 1 Tab by mouth four (4) times daily. Max Daily Amount: 1,400 mg.  
  
 cetirizine 10 mg tablet Commonly known as:  ZYRTEC Take 1 Tab by mouth two (2) times a day. furosemide 20 mg tablet Commonly known as:  LASIX Take 1 Tab by mouth daily. MYRBETRIQ 25 mg ER tablet Generic drug:  mirabegron ER  
TAKE ONE TABLET BY MOUTH ONCE DAILY  
  
 omeprazole 20 mg capsule Commonly known as:  PRILOSEC Take 1 Cap by mouth daily. oxyCODONE-acetaminophen 5-325 mg per tablet Commonly known as:  PERCOCET Take 1 Tab by mouth every six (6) hours as needed for Pain. Max Daily Amount: 4 Tabs. * Notice: This list has 2 medication(s) that are the same as other medications prescribed for you. Read the directions carefully, and ask your doctor or other care provider to review them with you. Prescriptions Sent to Pharmacy Refills  
 benazepril (LOTENSIN) 10 mg tablet 5 Sig: Take 1 Tab by mouth daily. Class: Normal  
 Pharmacy: [address], [address] [place] [address] Ph #: [phone] Route: Oral  
  
We Performed the Following AMB POC EKG ROUTINE W/ 12 LEADS, SCREEN () [ [facility]] Follow-up Instructions Return in about 3 months (around 2/28/2018) for Hypertension . To-Do List   
 11/30/2017 ECG:  STRESS TEST CARDIAC Patient Instructions Increasing blood pressure medication to 30mg daily.   
Schedule stress test  
 Decrease use of naproxen and try tylenol for pain Introducing hospitals & HEALTH SERVICES! Romayne Duster introduces Travel Notes patient portal. Now you can access parts of your medical record, email your doctor's office, and request medication refills online. 1. In your internet browser, go to [email or web address] 2. Click on the First Time User? Click Here link in the Sign In box. You will see the New Member Sign Up page. 3. Enter your Travel Notes Access Code exactly as it appears below. You will not need to use this code after youve completed the sign-up process. If you do not sign up before the expiration date, you must request a new code. · Travel Notes Access Code: [email or web address] Expires: 1/31/2018  3:15 PM 
 
4. Enter the last four digits of your Social Security Number (xxxx) and Date of Birth (mm/dd/yyyy) as indicated and click Submit. You will be taken to the next sign-up page. 5. Create a Travel Notes ID. This will be your Travel Notes login ID and cannot be changed, so think of one that is secure and easy to remember. 6. Create a Travel Notes password. You can change your password at any time. 7. Enter your Password Reset Question and Answer. This can be used at a later time if you forget your password. 8. Enter your e-mail address. You will receive e-mail notification when new information is available in [address] 9. Click Sign Up. You can now view and download portions of your medical record. 10. Click the Download Summary menu link to download a portable copy of your medical information. If you have questions, please visit the Frequently Asked Questions section of the Travel Notes website. Remember, Travel Notes is NOT to be used for urgent needs. For medical emergencies, dial 911. Now available from your iPhone and Android! Please provide this summary of care documentation to your next provider. Your primary care clinician is listed as LANRE PENNINGTON [address] [place] [address] Ave.  If you have any questions after today's visit, please call [phone].

## 2017-11-30 NOTE — PROGRESS NOTES
CC: Hypertension (follow up); Weight Gain (follow up); and Fall (11/17/17)  Chest pain    HPI:    She is a 79 y.o. female who presents for evaluation of multiple issues    Aayush Lagos 11/172017 at [facility] floor. Patient complains of pain under the breast right - x ray showed no fractures. Patient saw Dr Marisela Brasher and stayed home for one week-and took muscle relaxant and pain medication with relief. Pain level is 4 out of 10 today in the rib cage on the right. Patient is no longer taking narcotics    Increased stress: daughter may have lung cancer in her lungs which is causing a lot of stress ( carcinoid tumor)   Patient is taking Wellbutrin and tolerating medication well    Work is very stressfull. Considering step down to a part time job. Chest discomfort: Patient has felt intermittent chest heaviness for the past 2 months. Nonexertional.  It lasts minutes. No shortness of breath. Obesity : \"eating for the sake of eating\": To relieve stress. ROS:  10 systems reviewed and negative other than HPI    Past Medical History:   Diagnosis Date    Arthritis     Hypertension        Current Outpatient Prescriptions on File Prior to Visit   Medication Sig Dispense Refill    carisoprodol (SOMA) 350 mg tablet Take 1 Tab by mouth four (4) times daily. Max Daily Amount: 1,400 mg. 12 Tab 0    cetirizine (ZYRTEC) 10 mg tablet Take 1 Tab by mouth two (2) times a day. 60 Tab 0    benazepril (LOTENSIN) 20 mg tablet TAKE ONE TABLET BY MOUTH ONCE DAILY 30 Tab 5    MYRBETRIQ 25 mg ER tablet TAKE ONE TABLET BY MOUTH ONCE DAILY 30 Tab 5    buPROPion XL (WELLBUTRIN XL) 150 mg tablet Take 1 Tab by mouth every morning. 30 Tab 5    ALPRAZolam (XANAX) 0.25 mg tablet Take 1 Tab by mouth daily as needed for Anxiety. 30 Tab 1    omeprazole (PRILOSEC) 20 mg capsule Take 1 Cap by mouth daily. 30 Cap 5    furosemide (LASIX) 20 mg tablet Take 1 Tab by mouth daily.  30 Tab 5    oxyCODONE-acetaminophen (PERCOCET) 5-325 mg per tablet Take 1 Tab by mouth every six (6) hours as needed for Pain. Max Daily Amount: 4 Tabs. 10 Tab 0     No current facility-administered medications on file prior to visit. Past Surgical History:   Procedure Laterality Date    HX CHOLECYSTECTOMY      HX HEENT      HX ORTHOPAEDIC      Bilateral Knee Replacement       Family History   Problem Relation Age of Onset    Cancer Mother      pancreatic cancer    Cancer Brother      Reviewed and no changes     Social History     Social History    Marital status:      Spouse name: N/A    Number of children: N/A    Years of education: N/A     Occupational History    Not on file.      Social History Main Topics    Smoking status: Never Smoker    Smokeless tobacco: Never Used    Alcohol use No    Drug use: No    Sexual activity: Yes     Partners: Female     Birth control/ protection: None     Other Topics Concern    Not on file     Social History Narrative            Visit Vitals    /81 (BP 1 Location: Right arm, BP Patient Position: Sitting)    Pulse 71    Temp 97.7 °F (36.5 °C) (Oral)    Resp 18    Ht 4' 11\" (1.499 m)    Wt 262 lb 3.2 oz (118.9 kg)    LMP  (LMP Unknown)    SpO2 97%    BMI 52.96 kg/m2       Physical Examination:   General - Well appearing female  HEENT - PERRL, TM no erythema/opacification, normal nasal turbinates, oropharynx no erythema or exudate, MMM  Neck - supple, no bruits, no TMG, no LAD  Pulm - clear to auscultation bilaterally  Cardio - RRR, normal S1 S2, no murmur gallops or rubs  Abd - soft, nontender, no masses, no HSM  Extrem - no edema, +2 distal pulses  Psych - normal affect, appropriate mood    Lab Results   Component Value Date/Time    WBC 7.7 03/03/2016 11:52 AM    HGB 14.6 03/03/2016 11:52 AM    HCT 43.4 03/03/2016 11:52 AM    PLATELET 293 85/72/1375 11:52 AM    MCV 82 03/03/2016 11:52 AM     Lab Results   Component Value Date/Time    Sodium 142 08/15/2017 08:45 AM    Potassium 4.6 08/15/2017 08:45 AM    Chloride 103 08/15/2017 08:45 AM    CO2 23 08/15/2017 08:45 AM    Anion gap 6 01/30/2015 10:45 AM    Glucose 100 08/15/2017 08:45 AM    BUN 17 08/15/2017 08:45 AM    Creatinine 0.73 08/15/2017 08:45 AM    BUN/Creatinine ratio 23 08/15/2017 08:45 AM    GFR est AA 99 08/15/2017 08:45 AM    GFR est non-AA 85 08/15/2017 08:45 AM    Calcium 9.1 08/15/2017 08:45 AM     Lab Results   Component Value Date/Time    Cholesterol, total 206 07/18/2017 09:32 AM    HDL Cholesterol 57 07/18/2017 09:32 AM    LDL, calculated 134 07/18/2017 09:32 AM    VLDL, calculated 15 07/18/2017 09:32 AM    Triglyceride 77 07/18/2017 09:32 AM     Lab Results   Component Value Date/Time    TSH 1.600 03/03/2016 11:52 AM     No results found for: PSA, [place], [id number], [id number], PSALT  Lab Results   Component Value Date/Time    Hemoglobin A1c 5.6 07/18/2017 09:32 AM    Hemoglobin A1c (POC) 5.8 05/27/2016 10:00 AM     No results found for: Mauri Martinez [id number]    Lab Results   Component Value Date/Time    ALT (SGPT) 17 08/15/2017 08:45 AM    AST (SGOT) 15 08/15/2017 08:45 AM    Alk. phosphatase 83 08/15/2017 08:45 AM    Bilirubin, total 0.7 08/15/2017 08:45 AM     EKG shows T-wave inversion in lead III and aVF and Q waves in lead III and aVF and V1. Unchanged from 2016. Otherwise normal EKG. Assessment/Plan:    1. Essential hypertension  -Blood pressure is not well controlled. This has been noted on multiple consecutive visits. I am increasing her benazepril from 20 mg to 30 mg.  Prescribed 10 mg pill to be taken in addition to the 20 mg pill. - benazepril (LOTENSIN) 10 mg tablet; Take 1 Tab by mouth daily. Dispense: 30 Tab; Refill: 5  -recommend checking blood pressure with goal of less than  130/85 on average. Follow lo sodium diet. Given DASH diet guidelines. Recommend cardiovascular exercise regularly. Work on weight reduction.  Call with blood pressure readings.   -Take Lasix 3 times a week    2. Anxiety: Has increased anxiety with daughter with suspicion of cancer and stressful job. Patient can meet with the  at her child today and consider switching bank branch to decrease stress  - continue Wellbutrin. 3. Other chest pain: Today patient is chest pain-free. -EKG is unchanged from previous. Patient was advised to go to emergency room if develops chest pain. - AMB POC EKG ROUTINE W/ 12 LEADS, SCREEN ()  - STRESS TEST CARDIAC; Future      Follow-up Disposition:  Return in about 3 months (around 2/28/2018) for Hypertension .     MD Rafael

## 2017-11-30 NOTE — PROGRESS NOTES
Chief Complaint   Patient presents with    Hypertension     follow up    Weight Gain     follow up    Fall     11/17/17     1. Have you been to the ER, urgent care clinic since your last visit? Hospitalized since your last visit? Yes When: 11/18/17 Where: [facility] Reason for visit: Fall    2. Have you seen or consulted any other health care providers outside of the [address] [place] [address] since your last visit? Include any pap smears or colon screening.  No

## 2017-11-30 NOTE — PATIENT INSTRUCTIONS
Increasing blood pressure medication to 30mg daily.    Schedule stress test   Decrease use of naproxen and try tylenol for pain

## 2017-12-19 DIAGNOSIS — K21.9 GASTROESOPHAGEAL REFLUX DISEASE WITHOUT ESOPHAGITIS: ICD-10-CM

## 2017-12-19 NOTE — TELEPHONE ENCOUNTER
Requested Prescriptions     Pending Prescriptions Disp Refills    omeprazole (PRILOSEC) 20 mg capsule 30 Cap 5     Sig: Take 1 Cap by mouth daily.      Last Refill:2/1/17    Last Office Visit: 11/30/17    Upcoming Appointment: n/s

## 2017-12-20 RX ORDER — OMEPRAZOLE 20 MG/1
20 CAPSULE, DELAYED RELEASE ORAL DAILY
Qty: 30 CAP | Refills: 5 | Status: SHIPPED | OUTPATIENT
Start: 2017-12-20 | End: 2018-06-08 | Stop reason: SDUPTHER

## 2017-12-27 ENCOUNTER — HOSPITAL ENCOUNTER (EMERGENCY)
Age: 67
Discharge: HOME | End: 2017-12-27
Attending: FAMILY MEDICINE

## 2017-12-27 ENCOUNTER — TELEPHONE (OUTPATIENT)
Dept: INTERNAL MEDICINE CLINIC | Age: 67
End: 2017-12-27

## 2017-12-27 VITALS
RESPIRATION RATE: 18 BRPM | OXYGEN SATURATION: 97 % | HEART RATE: 60 BPM | SYSTOLIC BLOOD PRESSURE: 149 MMHG | BODY MASS INDEX: 52.82 KG/M2 | TEMPERATURE: 97.6 F | DIASTOLIC BLOOD PRESSURE: 69 MMHG | WEIGHT: 262 LBS | HEIGHT: 59 IN

## 2017-12-27 DIAGNOSIS — J06.9 ACUTE UPPER RESPIRATORY INFECTION: Primary | ICD-10-CM

## 2017-12-27 RX ORDER — FLUTICASONE PROPIONATE 50 MCG
2 SPRAY, SUSPENSION (ML) NASAL DAILY
Qty: 1 BOTTLE | Refills: 0 | Status: SHIPPED | OUTPATIENT
Start: 2017-12-27 | End: 2018-02-24

## 2017-12-27 RX ORDER — PREDNISONE 5 MG/1
TABLET ORAL
Qty: 21 TAB | Refills: 0 | Status: SHIPPED | OUTPATIENT
Start: 2017-12-27 | End: 2018-02-24

## 2017-12-27 RX ORDER — PROMETHAZINE HYDROCHLORIDE AND DEXTROMETHORPHAN HYDROBROMIDE 6.25; 15 MG/5ML; MG/5ML
5 SYRUP ORAL
Qty: 100 ML | Refills: 0 | Status: SHIPPED | OUTPATIENT
Start: 2017-12-27 | End: 2018-02-24

## 2017-12-27 RX ORDER — BENZONATATE 200 MG/1
200 CAPSULE ORAL
Qty: 21 CAP | Refills: 0 | Status: SHIPPED | OUTPATIENT
Start: 2017-12-27 | End: 2018-01-03

## 2017-12-27 NOTE — TELEPHONE ENCOUNTER
Patient called and yesterday and today trying to get in to be seen. She is sneezing, coughing,chest pain and congested. She want to be seen or called in something at the pharmacy. I told her about the [address] as an option.  Thanks

## 2017-12-27 NOTE — Clinical Note
Fluids/ gargles Claritin/ allegra Tylenol cold-sinus - max strength 1-2 tab 4 times/ day  
 with Advil as needed If not better in 4-5 days - may use prednisone

## 2017-12-27 NOTE — DISCHARGE INSTRUCTIONS
Saline Nasal Washes: Care Instructions  Your Care Instructions  Saline nasal washes help keep the nasal passages open by washing out thick or dried mucus. This simple remedy can help relieve symptoms of allergies, sinusitis, and colds. It also can make the nose feel more comfortable by keeping the mucous membranes moist. You may notice a little burning sensation in your nose the first few times you use the solution, but this usually gets better in a few days. Follow-up care is a key part of your treatment and safety. Be sure to make and go to all appointments, and call your doctor if you are having problems. It's also a good idea to know your test results and keep a list of the medicines you take. How can you care for yourself at home? · You can buy premixed saline solution in a squeeze bottle or other sinus rinse products at a drugstore. Read and follow the instructions on the label. · You also can make your own saline solution by adding 1 teaspoon of salt and 1 teaspoon of baking soda to 2 cups of distilled water. · If you use a homemade solution, pour a small amount into a clean bowl. Using a rubber bulb syringe, squeeze the syringe and place the tip in the salt water. Pull a small amount of the salt water into the syringe by relaxing your hand. · Sit down with your head tilted slightly back. Do not lie down. Put the tip of the bulb syringe or the squeeze bottle a little way into one of your nostrils. Gently drip or squirt a few drops into the nostril. Repeat with the other nostril. Some sneezing and gagging are normal at first.  · Gently blow your nose. · Wipe the syringe or bottle tip clean after each use. · Repeat this 2 or 3 times a day. · Use nasal washes gently if you have nosebleeds often. When should you call for help? Watch closely for changes in your health, and be sure to contact your doctor if:  ? · You often get nosebleeds. ? · You have problems doing the nasal washes.    Where can you learn more? Go to http://eusebia-chuck.info/. Enter [id number] in the search box to learn more about \"Saline Nasal Washes: Care Instructions. \"  Current as of: May 12, 2017  Content Version: 11.4  © 2013-1610 [employer]. Care instructions adapted under license by [employer] (which disclaims liability or warranty for this information). If you have questions about a medical condition or this instruction, always ask your healthcare professional. [address] any warranty or liability for your use of this information. Upper Respiratory Infection (Cold): Care Instructions  Your Care Instructions    An upper respiratory infection, or URI, is an infection of the nose, sinuses, or throat. URIs are spread by coughs, sneezes, and direct contact. The common cold is the most frequent kind of URI. The flu and sinus infections are other kinds of URIs. Almost all URIs are caused by viruses. Antibiotics won't cure them. But you can treat most infections with home care. This may include drinking lots of fluids and taking over-the-counter pain medicine. You will probably feel better in 4 to 10 days. The doctor has checked you carefully, but problems can develop later. If you notice any problems or new symptoms, get medical treatment right away. Follow-up care is a key part of your treatment and safety. Be sure to make and go to all appointments, and call your doctor if you are having problems. It's also a good idea to know your test results and keep a list of the medicines you take. How can you care for yourself at home? · To prevent dehydration, drink plenty of fluids, enough so that your urine is light yellow or clear like water. Choose water and other caffeine-free clear liquids until you feel better. If you have kidney, heart, or liver disease and have to limit fluids, talk with your doctor before you increase the amount of fluids you drink.   · Take an over-the-counter pain medicine, such as acetaminophen (Tylenol), ibuprofen (Advil, Motrin), or naproxen (Aleve). Read and follow all instructions on the label. · Before you use cough and cold medicines, check the label. These medicines may not be safe for young children or for people with certain health problems. · Be careful when taking over-the-counter cold or flu medicines and Tylenol at the same time. Many of these medicines have acetaminophen, which is Tylenol. Read the labels to make sure that you are not taking more than the recommended dose. Too much acetaminophen (Tylenol) can be harmful. · Get plenty of rest.  · Do not smoke or allow others to smoke around you. If you need help quitting, talk to your doctor about stop-smoking programs and medicines. These can increase your chances of quitting for good. When should you call for help? Call 911 anytime you think you may need emergency care. For example, call if:  ? · You have severe trouble breathing. ?Call your doctor now or seek immediate medical care if:  ? · You seem to be getting much sicker. ? · You have new or worse trouble breathing. ? · You have a new or higher fever. ? · You have a new rash. ? Watch closely for changes in your health, and be sure to contact your doctor if:  ? · You have a new symptom, such as a sore throat, an earache, or sinus pain. ? · You cough more deeply or more often, especially if you notice more mucus or a change in the color of your mucus. ? · You do not get better as expected. Where can you learn more? Go to http://eusebia-chuck.info/. Enter V952 in the search box to learn more about \"Upper Respiratory Infection (Cold): Care Instructions. \"  Current as of: May 12, 2017  Content Version: 11.4  © 9551-8418 [employer], [employer]. Care instructions adapted under license by [employer] (which disclaims liability or warranty for this information).  If you have questions about a medical condition or this instruction, always ask your healthcare professional. [place] [address] any warranty or liability for your use of this information.

## 2017-12-27 NOTE — UC PROVIDER NOTE
Patient is a 79 y.o. female presenting with cold symptoms. The history is provided by the patient. Cold Symptoms    This is a new problem. The current episode started 2 days ago. There has been no fever. Associated symptoms include congestion, headaches, plugged ear sensation, rhinorrhea, sinus pain, sneezing, sore throat and cough. Pertinent negatives include no wheezing (c/o chest tightness). She has tried nothing for the symptoms. Past Medical History:   Diagnosis Date    Arthritis     Hypertension         Past Surgical History:   Procedure Laterality Date    HX CHOLECYSTECTOMY      HX HEENT      HX ORTHOPAEDIC      Bilateral Knee Replacement         Family History   Problem Relation Age of Onset    Cancer Mother      pancreatic cancer    Cancer Brother         Social History     Social History    Marital status:      Spouse name: N/A    Number of children: N/A    Years of education: N/A     Occupational History    Not on file. Social History Main Topics    Smoking status: Never Smoker    Smokeless tobacco: Never Used    Alcohol use No    Drug use: No    Sexual activity: Yes     Partners: Female     Birth control/ protection: None     Other Topics Concern    Not on file     Social History Narrative                ALLERGIES: Latex; Adhesive; Raspberry; and Tetracycline    Review of Systems   HENT: Positive for congestion, rhinorrhea, sinus pain, sneezing and sore throat. Respiratory: Positive for cough. Negative for wheezing (c/o chest tightness). Neurological: Positive for headaches. All other systems reviewed and are negative. Vitals:    12/27/17 1258 12/27/17 1259   BP:  149/69   Pulse:  60   Resp:  18   Temp:  97.6 °F (36.4 °C)   SpO2:  97%   Weight: 118.8 kg (262 lb)    Height: 4' 11\" (1.499 m)        Physical Exam   Constitutional: No distress.    HENT:   Right Ear: Tympanic membrane and ear canal normal.   Left Ear: Tympanic membrane and ear canal normal. Nose: Nose normal.   Mouth/Throat: No oropharyngeal exudate, posterior oropharyngeal edema or posterior oropharyngeal erythema. Eyes: Conjunctivae are normal. Right eye exhibits no discharge. Left eye exhibits no discharge. Neck: Neck supple. Pulmonary/Chest: Effort normal and breath sounds normal. No respiratory distress. She has no wheezes. She has no rales. Lymphadenopathy:     She has no cervical adenopathy. Skin: No rash noted. Nursing note and vitals reviewed. MDM     Differential Diagnosis; Clinical Impression; Plan:     CLINICAL IMPRESSION:  Acute upper respiratory infection  (primary encounter diagnosis)      DDX    Plan:    Fluids/ gargles  Claritin/ allegra   Tylenol cold-sinus - max strength 1-2 tab 4 times/ day    with Advil as needed    If not better in 4-5 days - may use prednisone. Tessalon- prednisone- promethazine DM and flonase. Amount and/or Complexity of Data Reviewed:    Review and summarize past medical records:  Yes  Risk of Significant Complications, Morbidity, and/or Mortality:   Presenting problems: Moderate  Management options:   Moderate  Progress:   Patient progress:  Stable      Procedures

## 2017-12-27 NOTE — TELEPHONE ENCOUNTER
Called patient. Two patient identifiers verified. Complaint of congestion, sore throat, dry cough, and chest tightness. Is taking a cold and sinus medication over the counter. (patient did not give name of med.)  Advised patient we have no available appointments in office today. Offered appointment with Dr. Hung Martinez tomorrow or recommended going to urgent care today. Patient states she will go to Urgent care.

## 2018-01-07 DIAGNOSIS — F41.9 ANXIETY: ICD-10-CM

## 2018-01-08 RX ORDER — ALPRAZOLAM 0.25 MG/1
TABLET ORAL
Qty: 30 TAB | Refills: 0 | Status: SHIPPED | OUTPATIENT
Start: 2018-01-08 | End: 2018-01-10 | Stop reason: SDUPTHER

## 2018-01-10 DIAGNOSIS — F41.9 ANXIETY: ICD-10-CM

## 2018-01-12 RX ORDER — ALPRAZOLAM 0.25 MG/1
TABLET ORAL
Qty: 30 TAB | Refills: 0 | Status: SHIPPED | OUTPATIENT
Start: 2018-01-12 | End: 2018-06-25 | Stop reason: SDUPTHER

## 2018-01-15 ENCOUNTER — TELEPHONE (OUTPATIENT)
Dept: INTERNAL MEDICINE CLINIC | Age: 68
End: 2018-01-15

## 2018-01-15 DIAGNOSIS — F41.9 ANXIETY: ICD-10-CM

## 2018-01-15 RX ORDER — ALPRAZOLAM 0.25 MG/1
TABLET ORAL
Qty: 30 TAB | Refills: 0 | OUTPATIENT
Start: 2018-01-15

## 2018-01-15 NOTE — TELEPHONE ENCOUNTER
Pt is almost out of medication, needs refill on \"Alprazolam 0.25 mg\" last filled 09/17.      Best contact#: [phone]       Message received & copied from [facility]

## 2018-02-15 DIAGNOSIS — N32.81 OVERACTIVE BLADDER: ICD-10-CM

## 2018-02-15 DIAGNOSIS — E66.01 MORBID OBESITY DUE TO EXCESS CALORIES (HCC): ICD-10-CM

## 2018-02-16 RX ORDER — MIRABEGRON 25 MG/1
TABLET, FILM COATED, EXTENDED RELEASE ORAL
Qty: 30 TAB | Refills: 5 | Status: SHIPPED | OUTPATIENT
Start: 2018-02-16 | End: 2018-06-25 | Stop reason: SDUPTHER

## 2018-02-16 RX ORDER — BUPROPION HYDROCHLORIDE 150 MG/1
TABLET ORAL
Qty: 30 TAB | Refills: 5 | Status: SHIPPED | OUTPATIENT
Start: 2018-02-16 | End: 2018-06-25 | Stop reason: DRUGHIGH

## 2018-02-24 ENCOUNTER — HOSPITAL ENCOUNTER (EMERGENCY)
Age: 68
Discharge: HOME | End: 2018-02-24
Attending: EMERGENCY MEDICINE

## 2018-02-24 VITALS
BODY MASS INDEX: 52.41 KG/M2 | OXYGEN SATURATION: 95 % | WEIGHT: 260 LBS | TEMPERATURE: 98.2 F | DIASTOLIC BLOOD PRESSURE: 93 MMHG | HEIGHT: 59 IN | HEART RATE: 60 BPM | RESPIRATION RATE: 16 BRPM | SYSTOLIC BLOOD PRESSURE: 171 MMHG

## 2018-02-24 DIAGNOSIS — T78.40XA ALLERGIC REACTION, INITIAL ENCOUNTER: Primary | ICD-10-CM

## 2018-02-24 RX ORDER — METHYLPREDNISOLONE 4 MG/1
TABLET ORAL
Qty: 1 DOSE PACK | Refills: 0 | Status: SHIPPED | OUTPATIENT
Start: 2018-02-24 | End: 2018-06-25 | Stop reason: ALTCHOICE

## 2018-02-24 NOTE — UC PROVIDER NOTE
Patient is a 79 y.o. female presenting with rash. The history is provided by the patient. Rash    This is a new problem. The current episode started more than 2 days ago. The problem has not changed since onset. Associated with: rash after trying to remove a skin tag at home and then applying a band-aid. She has had reaactions like this in the past to adhesive. She has also been applying neosporin as well. There has been no fever. The rash is present on the neck. The pain is mild. The pain has been constant since onset. Associated symptoms include itching, pain (tender with manilpulation of the skin tag) and weeping (some a few days ago, none now). She has tried oral antihistamines for the symptoms. The treatment provided mild relief. Past Medical History:   Diagnosis Date    Arthritis     Hypertension         Past Surgical History:   Procedure Laterality Date    HX CHOLECYSTECTOMY      HX HEENT      HX ORTHOPAEDIC      Bilateral Knee Replacement         Family History   Problem Relation Age of Onset    Cancer Mother      pancreatic cancer    Cancer Brother         Social History     Social History    Marital status:      Spouse name: N/A    Number of children: N/A    Years of education: N/A     Occupational History    Not on file. Social History Main Topics    Smoking status: Never Smoker    Smokeless tobacco: Never Used    Alcohol use No    Drug use: No    Sexual activity: Yes     Partners: Female     Birth control/ protection: None     Other Topics Concern    Not on file     Social History Narrative                ALLERGIES: Latex; Adhesive; Raspberry; and Tetracycline    Review of Systems   Constitutional: Negative. Respiratory: Negative. Skin: Positive for itching and rash. Neurological: Negative.         Vitals:    02/24/18 0841   BP: (!) 171/93   Pulse: 60   Resp: 16   Temp: 98.2 °F (36.8 °C)   SpO2: 95%   Weight: 117.9 kg (260 lb)   Height: 4' 11\" (1.499 m) Physical Exam   Constitutional: She is oriented to person, place, and time. She appears well-developed and well-nourished. HENT:   Head: Normocephalic and atraumatic. Mouth/Throat: Oropharynx is clear and moist.   Eyes: Conjunctivae are normal.   Neck: Normal range of motion. Neck supple. Lymphadenopathy:     She has no cervical adenopathy. Neurological: She is alert and oriented to person, place, and time. Skin: Skin is warm and dry. Rash noted. There is erythema. 5cm by 3cm pink blanchable and dry papular rash present on the rt side of her neck with a central located fleshy skin tag. The skin tag is mildly tender. No swelling. The rash follows the pattern of the bandaid. No adenopathy   Psychiatric: She has a normal mood and affect. Her behavior is normal. Judgment and thought content normal.   Nursing note and vitals reviewed. MDM     Differential Diagnosis; Clinical Impression; Plan:     CLINICAL IMPRESSION:  Allergic reaction, initial encounter  (primary encounter diagnosis)    Plan:  1. Allegra, pepcid daily for 4-5 days   2. Medrol dose pack  3. Supportive care  Encouraged to take home BP meds    Risk of Significant Complications, Morbidity, and/or Mortality:   Presenting problems: Moderate  Management options:   Moderate  Progress:   Patient progress:  Stable      Procedures

## 2018-02-24 NOTE — DISCHARGE INSTRUCTIONS

## 2018-04-04 DIAGNOSIS — I10 ESSENTIAL HYPERTENSION: ICD-10-CM

## 2018-04-04 NOTE — TELEPHONE ENCOUNTER
PCP: Whit Calderón MD    Last appt: 11/30/2017  No future appointments. Requested Prescriptions     Pending Prescriptions Disp Refills    benazepril (LOTENSIN) 10 mg tablet 90 Tab 1     Sig: Take 1 Tab by mouth daily.

## 2018-04-05 RX ORDER — BENAZEPRIL HYDROCHLORIDE 10 MG/1
10 TABLET ORAL DAILY
Qty: 90 TAB | Refills: 0 | Status: SHIPPED | OUTPATIENT
Start: 2018-04-05 | End: 2018-06-25 | Stop reason: SDUPTHER

## 2018-04-16 DIAGNOSIS — I10 ESSENTIAL HYPERTENSION: ICD-10-CM

## 2018-04-16 NOTE — TELEPHONE ENCOUNTER
Pt stated that her refill Rx \"Benazepril 20mg\" was rejected . Pt uses [address] one file.      Pharmacy number [phone]   Best contact number [phone]       Message received & copied from [facility]

## 2018-04-16 NOTE — TELEPHONE ENCOUNTER
PCP: MD Rafael    Last appt: 11/30/2017  No future appointments. Requested Prescriptions     Pending Prescriptions Disp Refills    benazepril (LOTENSIN) 20 mg tablet 90 Tab 1     Sig: TAKE ONE TABLET BY MOUTH ONCE DAILY     Patient states her  is very ill and currently in ICU. She will schedule a f/u appointment as soon as possible.

## 2018-04-18 RX ORDER — BENAZEPRIL HYDROCHLORIDE 20 MG/1
TABLET ORAL
Qty: 90 TAB | Refills: 0 | Status: SHIPPED | OUTPATIENT
Start: 2018-04-18 | End: 2018-06-25 | Stop reason: SDUPTHER

## 2018-05-07 ENCOUNTER — TELEPHONE (OUTPATIENT)
Dept: INTERNAL MEDICINE CLINIC | Age: 68
End: 2018-05-07

## 2018-05-07 NOTE — TELEPHONE ENCOUNTER
#[phone] pt states she is having problems sleeping since her  passed. She would like to speak with you about this and what can be done as she is working and can't wait until the end of the month to wait on this.

## 2018-05-08 NOTE — TELEPHONE ENCOUNTER
Spoke with patient. Two pt identifiers confirmed. Patient states that she needs something for sleep. Patient states that she has been having issues with sleeping since her  passed on 04/21/15  Patient states that she can fall after an hour or so, sleeps for maybe 1-12 hours and then she is awake for the rest of the night. Advised patient that I will send her request to Dr. Cecily Archibald and someone will call her once her message has been reviewed. Pt verbalized understanding of information discussed w/ no further questions at this time.

## 2018-05-08 NOTE — TELEPHONE ENCOUNTER
She needs to be seen in the office to discuss side effects of medication and decide on best medication for her.    She can use melatonin 6mg OTC and Unisom ( can use together) in the meanwhile

## 2018-05-08 NOTE — TELEPHONE ENCOUNTER
Pt. Requesting a refill on the following prescription: Sleep medication. The following prescription needs to be sent to [facility]        Message received & copied from [facility] after closing on 5/7/18

## 2018-05-09 ENCOUNTER — TELEPHONE (OUTPATIENT)
Dept: INTERNAL MEDICINE CLINIC | Age: 68
End: 2018-05-09

## 2018-05-09 DIAGNOSIS — G47.00 INSOMNIA, UNSPECIFIED TYPE: Primary | ICD-10-CM

## 2018-05-09 NOTE — TELEPHONE ENCOUNTER
----- Message from Wilfrido Fleming sent at 5/8/2018  4:22 PM EDT -----  Regarding: Dr. Lauren Boyce / Telephone  Pt stated she has tried to reach out to the practice regarding getting a Rx called in to help her sleep. Pt stated her  passed away 2 weeks ago and cannot sleep at night.   [facility] pharmacy: [phone]  Fax: pt unsure  Pt's best contact: [phone]      Message copied/pasted from [facility]

## 2018-05-09 NOTE — TELEPHONE ENCOUNTER
Left message for patient that She needs to be seen in the office to discuss side effects of medication and decide on best medication for her. She can use melatonin 6mg OTC and                            Unisom ( can use together) in the                                   meanwhile.

## 2018-05-10 RX ORDER — ZOLPIDEM TARTRATE 5 MG/1
5 TABLET ORAL
Qty: 10 TAB | Refills: 0 | OUTPATIENT
Start: 2018-05-10 | End: 2019-07-19 | Stop reason: ALTCHOICE

## 2018-05-10 NOTE — TELEPHONE ENCOUNTER
MD [facility] 18 hours ago (1:54 PM)                   Please call in 10 pills of ambien 5mg no refills and schedule patient to see me in the office at her earliest convenience   Thanks     Ambien 5mg PO QHS #10 no refills (Routing comment

## 2018-05-28 ENCOUNTER — HOSPITAL ENCOUNTER (EMERGENCY)
Age: 68
Discharge: HOME | End: 2018-05-28
Attending: EMERGENCY MEDICINE | Admitting: EMERGENCY MEDICINE
Payer: MEDICARE

## 2018-05-28 VITALS
HEART RATE: 87 BPM | OXYGEN SATURATION: 95 % | SYSTOLIC BLOOD PRESSURE: 184 MMHG | HEIGHT: 59 IN | BODY MASS INDEX: 53.24 KG/M2 | WEIGHT: 264.11 LBS | RESPIRATION RATE: 16 BRPM | DIASTOLIC BLOOD PRESSURE: 115 MMHG | TEMPERATURE: 98.2 F

## 2018-05-28 DIAGNOSIS — M79.601 MUSCULOSKELETAL PAIN OF RIGHT UPPER EXTREMITY: Primary | ICD-10-CM

## 2018-05-28 PROCEDURE — 74011250636 HC RX REV CODE- 250/636: Performed by: NURSE PRACTITIONER

## 2018-05-28 PROCEDURE — 99283 EMERGENCY DEPT VISIT LOW MDM: CPT

## 2018-05-28 PROCEDURE — 96372 THER/PROPH/DIAG INJ SC/IM: CPT

## 2018-05-28 PROCEDURE — 74011250637 HC RX REV CODE- 250/637: Performed by: NURSE PRACTITIONER

## 2018-05-28 RX ORDER — CYCLOBENZAPRINE HCL 10 MG
10 TABLET ORAL
Status: COMPLETED | OUTPATIENT
Start: 2018-05-28 | End: 2018-05-28

## 2018-05-28 RX ORDER — KETOROLAC TROMETHAMINE 30 MG/ML
30 INJECTION, SOLUTION INTRAMUSCULAR; INTRAVENOUS
Status: COMPLETED | OUTPATIENT
Start: 2018-05-28 | End: 2018-05-28

## 2018-05-28 RX ORDER — CYCLOBENZAPRINE HCL 5 MG
5 TABLET ORAL
Qty: 20 TAB | Refills: 0 | Status: SHIPPED | OUTPATIENT
Start: 2018-05-28 | End: 2018-06-25 | Stop reason: SDUPTHER

## 2018-05-28 RX ADMIN — CYCLOBENZAPRINE HYDROCHLORIDE 10 MG: 10 TABLET, FILM COATED ORAL at 09:00

## 2018-05-28 RX ADMIN — KETOROLAC TROMETHAMINE 30 MG: 30 INJECTION, SOLUTION INTRAMUSCULAR at 09:00

## 2018-05-28 NOTE — ED PROVIDER NOTES
EMERGENCY DEPARTMENT HISTORY AND PHYSICAL EXAM      Date: 5/28/2018  Patient Name: Naheed Mascorro    History of Presenting Illness     Chief Complaint   Patient presents with    Arm Pain     Pt. ambulatory to triage and complains of right arm after reaching behind her to get her glasses off night stand and woke up with right upper pain. History Provided By: Patient    HPI: Naheed Mascorro, 79 y.o. female with PMHx significant for HTN, arthritis, presents ambulatory to the ED with cc of gradual onset, moderate, worsening, RUE pain that began last night. Pt notes that she reached over behind her to place her glasses. She felt a second of immediate pain that subsided and pt went to bed. She woke up at 1:00 AM and rested on the couch. At 2:00 AM, pt notes that the pain returned to her RUE. The pain radiates from her shoulder to her elbow. She attempted to treat her sxs with Tylenol at 5:00 AM with no relief. She was unable to dress without assistance. Pt notes that she has been unable to sleep x 1 month due to her 's recent passing. She has a rx for Ambien, but pt has not used it due to concern for side effects. Pt denies recent falls or hitting her head. There are no other complaints, changes, or physical findings at this time. Social Hx: - Tobacco, - EtOH, - Illicit Drugs    PCP: MD Rafael    Current Outpatient Prescriptions   Medication Sig Dispense Refill    cyclobenzaprine (FLEXERIL) 5 mg tablet Take 1 Tab by mouth three (3) times daily as needed for Muscle Spasm(s). 20 Tab 0    zolpidem (AMBIEN) 5 mg tablet Take 1 Tab by mouth nightly as needed for Sleep. Max Daily Amount: 5 mg. 10 Tab 0    benazepril (LOTENSIN) 20 mg tablet TAKE ONE TABLET BY MOUTH ONCE DAILY 90 Tab 0    benazepril (LOTENSIN) 10 mg tablet Take 1 Tab by mouth daily.  90 Tab 0    methylPREDNISolone (MEDROL, REHANA,) 4 mg tablet Take as directed 1 Dose Pack 0    MYRBETRIQ 25 mg ER tablet TAKE ONE TABLET BY MOUTH ONCE DAILY 30 Tab 5    buPROPion XL (WELLBUTRIN XL) 150 mg tablet TAKE ONE TABLET BY MOUTH ONCE DAILY IN THE MORNING 30 Tab 5    ALPRAZolam (XANAX) 0.25 mg tablet TAKE ONE TABLET BY MOUTH ONCE DAILY AS NEEDED FOR ANXIETY 30 Tab 0    omeprazole (PRILOSEC) 20 mg capsule Take 1 Cap by mouth daily. 30 Cap 5    carisoprodol (SOMA) 350 mg tablet Take 1 Tab by mouth four (4) times daily. Max Daily Amount: 1,400 mg. 12 Tab 0    furosemide (LASIX) 20 mg tablet Take 1 Tab by mouth daily. 30 Tab 5     Past History     Past Medical History:  Past Medical History:   Diagnosis Date    Arthritis     Hypertension      Past Surgical History:  Past Surgical History:   Procedure Laterality Date    HX CHOLECYSTECTOMY      HX HEENT      HX ORTHOPAEDIC      Bilateral Knee Replacement     Family History:  Family History   Problem Relation Age of Onset    Cancer Mother      pancreatic cancer    Cancer Brother      Social History:  Social History   Substance Use Topics    Smoking status: Never Smoker    Smokeless tobacco: Never Used    Alcohol use No     Allergies: Allergies   Allergen Reactions    Latex Rash    Adhesive Rash    Raspberry Rash    Tetracycline Hives     Review of Systems   Review of Systems   Constitutional: Negative. Negative for activity change, appetite change, chills, fatigue, fever and unexpected weight change. HENT: Negative. Negative for congestion, hearing loss, rhinorrhea, sneezing and voice change. Eyes: Negative. Negative for pain and visual disturbance. Respiratory: Negative. Negative for apnea, cough, choking, chest tightness and shortness of breath. Cardiovascular: Negative. Negative for chest pain and palpitations. Gastrointestinal: Negative. Negative for abdominal distention, abdominal pain, blood in stool, diarrhea, nausea and vomiting. Genitourinary: Negative. Negative for difficulty urinating, flank pain, frequency and urgency.         No discharge Musculoskeletal: Positive for myalgias (RUE pain). Negative for arthralgias, back pain and neck stiffness. Skin: Negative. Negative for color change and rash. Neurological: Negative. Negative for dizziness, seizures, syncope, speech difficulty, weakness, numbness and headaches. Hematological: Negative for adenopathy. Psychiatric/Behavioral: Positive for sleep disturbance. Negative for agitation, behavioral problems, dysphoric mood and suicidal ideas. The patient is not nervous/anxious. Physical Exam   Physical Exam   Constitutional: She is oriented to person, place, and time. She appears well-developed and well-nourished. No distress. HENT:   Head: Normocephalic and atraumatic. Mouth/Throat: Oropharynx is clear and moist. No oropharyngeal exudate. Eyes: Conjunctivae and EOM are normal. Pupils are equal, round, and reactive to light. Right eye exhibits no discharge. Left eye exhibits no discharge. Neck: Normal range of motion. Neck supple. Cardiovascular: Normal rate, regular rhythm and intact distal pulses. Exam reveals no gallop and no friction rub. No murmur heard. Pulmonary/Chest: Effort normal and breath sounds normal. No respiratory distress. She has no wheezes. She has no rales. She exhibits no tenderness. Lungs clear   Abdominal: Soft. Bowel sounds are normal. She exhibits no distension and no mass. There is no tenderness. There is no rebound and no guarding. Musculoskeletal: Normal range of motion. She exhibits no edema. No reproducible tenderness    Lymphadenopathy:     She has no cervical adenopathy. Neurological: She is alert and oriented to person, place, and time. No cranial nerve deficit. Coordination normal.   Skin: Skin is warm and dry. No rash noted. No erythema. Psychiatric: She has a normal mood and affect. Nursing note and vitals reviewed.     Diagnostic Study Results     Labs -   No results found for this or any previous visit (from the past 12 hour(s)). Radiologic Studies -   No orders to display     CT Results  (Last 48 hours)    None        CXR Results  (Last 48 hours)    None        Medical Decision Making   I am the first provider for this patient. I reviewed the vital signs, available nursing notes, past medical history, past surgical history, family history and social history. Vital Signs-Reviewed the patient's vital signs. Patient Vitals for the past 12 hrs:   Temp Pulse Resp BP SpO2   05/28/18 0832 98.2 °F (36.8 °C) 87 16 (!) 184/115 95 %     Pulse Oximetry Analysis - 95% on RA    Records Reviewed: Nursing Notes and Old Medical Records    Provider Notes (Medical Decision Making):   DDx: grief, musculoskeletal pain     ED Course:   Initial assessment performed. The patients presenting problems have been discussed, and they are in agreement with the care plan formulated and outlined with them. I have encouraged them to ask questions as they arise throughout their visit. Disposition:  Discharge Note:  9:18 AM  The patient has been re-evaluated and is ready for discharge. Reviewed available results with patient. Counseled patient/parent/guardian on diagnosis and care plan. Patient has expressed understanding, and all questions have been answered. Patient agrees with plan and agrees to follow up as recommended, or return to the ED if their symptoms worsen. Discharge instructions have been provided and explained to the patient, along with reasons to return to the ED. PLAN:  1. Current Discharge Medication List      START taking these medications    Details   cyclobenzaprine (FLEXERIL) 5 mg tablet Take 1 Tab by mouth three (3) times daily as needed for Muscle Spasm(s). Qty: 20 Tab, Refills: 0           2.    Follow-up Information     Follow up With Details Comments [id number] [place], [address]  [facility] [address]  [facility].  [phone]          Return to ED if worse     Diagnosis Clinical Impression:   1. Musculoskeletal pain of right upper extremity      Attestations:    Attestation: This note is prepared by Polo Asher, acting as scribe for YULI Teresa NP: The scribe's documentation has been prepared under my direction and personally reviewed by me in its entirety. I confirm that the note above accurately reflects all work, treatment, procedures, and medical decision making performed by me.

## 2018-06-08 DIAGNOSIS — K21.9 GASTROESOPHAGEAL REFLUX DISEASE WITHOUT ESOPHAGITIS: ICD-10-CM

## 2018-06-08 RX ORDER — OMEPRAZOLE 20 MG/1
CAPSULE, DELAYED RELEASE ORAL
Qty: 30 CAP | Refills: 5 | Status: SHIPPED | OUTPATIENT
Start: 2018-06-08 | End: 2018-06-25 | Stop reason: SDUPTHER

## 2018-06-25 ENCOUNTER — OFFICE VISIT (OUTPATIENT)
Dept: INTERNAL MEDICINE CLINIC | Age: 68
End: 2018-06-25

## 2018-06-25 ENCOUNTER — HOSPITAL ENCOUNTER (OUTPATIENT)
Dept: LAB | Age: 68
Discharge: HOME | End: 2018-06-25
Payer: MEDICARE

## 2018-06-25 VITALS
OXYGEN SATURATION: 98 % | RESPIRATION RATE: 18 BRPM | SYSTOLIC BLOOD PRESSURE: 136 MMHG | DIASTOLIC BLOOD PRESSURE: 94 MMHG | TEMPERATURE: 97.8 F | HEIGHT: 59 IN | HEART RATE: 71 BPM | BODY MASS INDEX: 52.98 KG/M2 | WEIGHT: 262.8 LBS

## 2018-06-25 DIAGNOSIS — R79.89 LOW VITAMIN D LEVEL: ICD-10-CM

## 2018-06-25 DIAGNOSIS — Z12.11 SCREENING FOR COLON CANCER: ICD-10-CM

## 2018-06-25 DIAGNOSIS — F43.21 GRIEF: ICD-10-CM

## 2018-06-25 DIAGNOSIS — N32.81 OVERACTIVE BLADDER: ICD-10-CM

## 2018-06-25 DIAGNOSIS — Z00.00 MEDICARE ANNUAL WELLNESS VISIT, SUBSEQUENT: ICD-10-CM

## 2018-06-25 DIAGNOSIS — M79.601 RIGHT ARM PAIN: ICD-10-CM

## 2018-06-25 DIAGNOSIS — K21.9 GASTROESOPHAGEAL REFLUX DISEASE WITHOUT ESOPHAGITIS: ICD-10-CM

## 2018-06-25 DIAGNOSIS — I10 ESSENTIAL HYPERTENSION: ICD-10-CM

## 2018-06-25 DIAGNOSIS — F41.9 ANXIETY: Primary | ICD-10-CM

## 2018-06-25 DIAGNOSIS — E66.01 MORBID OBESITY DUE TO EXCESS CALORIES (HCC): ICD-10-CM

## 2018-06-25 PROCEDURE — 80053 COMPREHEN METABOLIC PANEL: CPT

## 2018-06-25 PROCEDURE — 85025 COMPLETE CBC W/AUTO DIFF WBC: CPT

## 2018-06-25 PROCEDURE — 82306 VITAMIN D 25 HYDROXY: CPT

## 2018-06-25 PROCEDURE — 36415 COLL VENOUS BLD VENIPUNCTURE: CPT

## 2018-06-25 RX ORDER — BUPROPION HYDROCHLORIDE 300 MG/1
300 TABLET ORAL
Qty: 90 TAB | Refills: 1 | Status: SHIPPED | OUTPATIENT
Start: 2018-06-25 | End: 2018-08-22 | Stop reason: SDUPTHER

## 2018-06-25 RX ORDER — ALPRAZOLAM 0.25 MG/1
TABLET ORAL
Qty: 30 TAB | Refills: 1 | Status: SHIPPED | OUTPATIENT
Start: 2018-06-25 | End: 2019-01-28 | Stop reason: SDUPTHER

## 2018-06-25 RX ORDER — BENAZEPRIL HYDROCHLORIDE 10 MG/1
10 TABLET ORAL DAILY
Qty: 90 TAB | Refills: 1 | Status: SHIPPED | OUTPATIENT
Start: 2018-06-25 | End: 2019-02-07 | Stop reason: SDUPTHER

## 2018-06-25 RX ORDER — OMEPRAZOLE 20 MG/1
20 CAPSULE, DELAYED RELEASE ORAL DAILY
Qty: 90 CAP | Refills: 1 | Status: SHIPPED | OUTPATIENT
Start: 2018-06-25 | End: 2019-01-08 | Stop reason: SINTOL

## 2018-06-25 RX ORDER — BENAZEPRIL HYDROCHLORIDE 20 MG/1
TABLET ORAL
Qty: 90 TAB | Refills: 1 | Status: SHIPPED | OUTPATIENT
Start: 2018-06-25 | End: 2019-01-28 | Stop reason: SDUPTHER

## 2018-06-25 RX ORDER — CYCLOBENZAPRINE HCL 5 MG
5 TABLET ORAL
Qty: 30 TAB | Refills: 1 | Status: SHIPPED | OUTPATIENT
Start: 2018-06-25 | End: 2018-11-07 | Stop reason: SDUPTHER

## 2018-06-25 NOTE — MR AVS SNAPSHOT
[address] [facility] 
[phone] Patient: Diego Sweeney MRN:  [facility]:[id number] Visit Information Date & Time Provider Department Dept. Phone Encounter #  
 6/25/2018  8:00 AM Ansley Perez, [address] [place] [address],[address] [phone] [id number] Follow-up Instructions Return in about 4 months (around 10/25/2018) for HTN. Upcoming Health Maintenance Date Due Cologuard Q 3 Years 6/28/2000 Influenza Age 5 to Adult 8/1/2018 MEDICARE YEARLY EXAM 8/16/2018 GLAUCOMA SCREENING Q2Y 1/10/2019 BREAST CANCER SCRN MAMMOGRAM 8/31/2019 DTaP/Tdap/Td series (2 - Td) 3/22/2027 Allergies as of 6/25/2018  Review Complete On: 6/25/2018 By: Ansley Perez MD  
  
 Severity Noted Reaction Type Reactions Latex Medium 05/27/2016    Rash Adhesive Medium 08/25/2016    Rash Raspberry Medium 05/27/2016    Rash Tetracycline Low 02/29/2012   Systemic Hives Current Immunizations  Reviewed on 3/23/2017 Name Date Influenza High Dose Vaccine PF 8/26/2017 Pneumococcal Conjugate (PCV-13) 7/18/2017  9:30 AM  
 Pneumococcal Polysaccharide (PPSV-23) 5/27/2016 Tdap 3/22/2017 Not reviewed this visit You Were Diagnosed With   
  
 Codes Comments Anxiety    -  Primary ICD-10-CM: F41.9 ICD-9-CM: 300.00 Essential hypertension     ICD-10-CM: I10 
ICD-9-CM: 401.9 Gastroesophageal reflux disease without esophagitis     ICD-10-CM: K21.9 ICD-9-CM: 530.81 Overactive bladder     ICD-10-CM: N32.81 ICD-9-CM: 596.51 Morbid obesity due to excess calories (HCC)     ICD-10-CM: E66.01 
ICD-9-CM: 278.01 Right arm pain     ICD-10-CM: B58.694 ICD-9-CM: 729.5 Grief     ICD-10-CM: U12.22 ICD-9-CM: 309.0 Low vitamin D level     ICD-10-CM: E55.9 ICD-9-CM: 268.9 Screening for colon cancer     ICD-10-CM: Z12.11 ICD-9-CM: V76.51   
 Medicare annual wellness visit, subsequent     ICD-10-CM: Z00.00 ICD-9-CM: V70.0 Vitals BP Pulse Temp Resp Height(growth percentile) Weight(growth percentile) (!) 136/94 (BP 1 Location: Left arm, BP Patient Position: Sitting) 71 97.8 °F (36.6 °C) (Oral) 18 4' 11\" (1.499 m) 262 lb 12.8 oz (119.2 kg) LMP SpO2 BMI OB Status Smoking Status (LMP Unknown) 98% 53.08 kg/m2 Postmenopausal Never Smoker BMI and BSA Data Body Mass Index Body Surface Area 53.08 kg/m 2 2.23 m 2 Preferred Pharmacy Pharmacy Name Phone [address], [place] [phone] Your Updated Medication List  
  
   
This list is accurate as of 6/25/18  8:32 AM.  Always use your most recent med list.  
  
  
  
  
 ALPRAZolam 0.25 mg tablet Commonly known as:  XANAX  
TAKE ONE TABLET BY MOUTH ONCE DAILY AS NEEDED FOR ANXIETY * benazepril 20 mg tablet Commonly known as:  LOTENSIN  
TAKE ONE TABLET BY MOUTH ONCE DAILY * benazepril 10 mg tablet Commonly known as:  LOTENSIN Take 1 Tab by mouth daily. buPROPion  mg XL tablet Commonly known as:  Harden Fuel Take 1 Tab by mouth every morning. cyclobenzaprine 5 mg tablet Commonly known as:  FLEXERIL Take 1 Tab by mouth three (3) times daily as needed for Muscle Spasm(s). furosemide 20 mg tablet Commonly known as:  LASIX Take 1 Tab by mouth daily. mirabegron ER 25 mg ER tablet Commonly known as:  MYRBETRIQ Take 1 Tab by mouth daily. omeprazole 20 mg capsule Commonly known as:  PRILOSEC Take 1 Cap by mouth daily. zolpidem 5 mg tablet Commonly known as:  AMBIEN Take 1 Tab by mouth nightly as needed for Sleep. Max Daily Amount: 5 mg.  
  
 * Notice: This list has 2 medication(s) that are the same as other medications prescribed for you. Read the directions carefully, and ask your doctor or other care provider to review them with you. Prescriptions Printed Refills ALPRAZolam (XANAX) 0.25 mg tablet 1 Sig: TAKE ONE TABLET BY MOUTH ONCE DAILY AS NEEDED FOR ANXIETY Class: Print Prescriptions Sent to Pharmacy Refills  
 benazepril (LOTENSIN) 20 mg tablet 1 Sig: TAKE ONE TABLET BY MOUTH ONCE DAILY Class: Normal  
 Pharmacy: [facility] Ph #: [phone]  
 benazepril (LOTENSIN) 10 mg tablet 1 Sig: Take 1 Tab by mouth daily. Class: Normal  
 Pharmacy: [facility] [address] GREG[address] CROWELL [address] [place] [address], [address] [place] [address] Ph #: [phone] Route: Oral  
 cyclobenzaprine (FLEXERIL) 5 mg tablet 1 Sig: Take 1 Tab by mouth three (3) times daily as needed for Muscle Spasm(s). Class: Normal  
 Pharmacy: [facility] [address] GREGORY CROWELL [address] [place] [address], [address] [place] [address] Ph #: [phone] Route: Oral  
 omeprazole (PRILOSEC) 20 mg capsule 1 Sig: Take 1 Cap by mouth daily. Class: Normal  
 Pharmacy: [facility] [address] GREGORY CROWELL [address] [place] [address], [address] [place] [address] Ph #: [phone] Route: Oral  
 mirabegron ER (MYRBETRIQ) 25 mg ER tablet 1 Sig: Take 1 Tab by mouth daily. Class: Normal  
 Pharmacy: [facility] [address] GREGORY CROWELL [address] [place] [address], [address] [place] [address] Ph #: [phone] Route: Oral  
 buPROPion XL (WELLBUTRIN XL) 300 mg XL tablet 1 Sig: Take 1 Tab by mouth every morning. Class: Normal  
 Pharmacy: [facility] [address] GREGORY CROWELL [address] [place] [address], [address] [place] [address] Ph #: [phone] Route: Oral  
  
We Performed the Following CBC WITH AUTOMATED DIFF [29748 CPT(R)] COLOGUARD TEST (FECAL DNA COLORECTAL CANCER SCREENING) [09865 CPT(R)] METABOLIC PANEL, COMPREHENSIVE [42950 CPT(R)] VITAMIN D, 25 HYDROXY [id number] CPT(R)] Follow-up Instructions Return in about 4 months (around 10/25/2018) for HTN. Patient Instructions Continue lisinopril 30mg daily Increase dose of wellbutrin to 300mg daily Cologuard ordered Medicare Wellness Visit, Female Done Today The best way to live healthy is to have a lifestyle where you eat a well-balanced diet, exercise regularly, limit alcohol use, and quit all forms of tobacco/nicotine, if applicable. Regular preventive services are another way to keep healthy. Preventive services (vaccines, screening tests, monitoring & exams) can help personalize your care plan, which helps you manage your own care. Screening tests can find health problems at the earliest stages, when they are easiest to treat. [address] [facility] follows the current, evidence-based guidelines published by the [facility] ([facility]) when recommending preventive services for our patients. Because we follow these guidelines, sometimes recommendations change over time as research supports it. (For example, mammograms used to be recommended annually. Even though Medicare will still pay for an annual mammogram, the newer guidelines recommend a mammogram every two years for women of average risk.) Of course, you and your provider may decide to screen more often for some diseases, based on your risk and co-morbidities (chronic disease you are already diagnosed with). Preventive services for you include: - Medicare offers their members a free annual wellness visit, which is time for you and your primary care provider to discuss and plan for your preventive service needs. Take advantage of this benefit every year! 
 
-All people over age 72 should receive the recommended pneumonia vaccines. Current USPSTF guidelines recommend a series of two vaccines for the best pneumonia protection.  
 
-All adults should have a yearly flu vaccine and a tetanus vaccine every 10 years.  All adults age 61 years should receive a shingles vaccine once in their lifetime.   
 
-A bone mass density test is recommended when a woman turns 65 to screen for osteoporosis. This test is only recommended once as a screening. Some providers will use this same test as a disease monitoring tool if you already have osteoporosis. -All adults age 38-68 years who are overweight should have a diabetes screening test once every three years.  
 
-Other screening tests & preventive services for persons with diabetes include: an eye exam to screen for diabetic retinopathy, a kidney function test, a foot exam, and stricter control over your cholesterol.  
 
-Cardiovascular screening for adults with routine risk involves an electrocardiogram (ECG) at intervals determined by the provider.  
 
-Colorectal cancer screenings should be done for adults age 54-65 years with normal risk. There are a number of acceptable methods of screening for this type of cancer. Each test has its own benefits and drawbacks. Discuss with your provider what is most appropriate for you during your annual wellness visit. The different tests include: colonoscopy (considered the best screening method), a fecal occult blood test, a fecal DNA test, and sigmoidoscopy. -Breast cancer screenings are recommended every other year for women of normal risk age 54-69 years.  
 
-Cervical cancer screenings for women over age 72 are only recommended with certain risk factors.  
 
-All adults born between [facility] should be screened once for Hepatitis C. Here is a list of your current Health Maintenance items (your personalized list of preventive services) with a due date: 
Health Maintenance Due Topic Date Due  
 Cologuard screening for colon cancer 06/28/2000 Shoulder Stretches: Exercises Your Care Instructions Here are some examples of exercises for your shoulder. Start each exercise slowly. Ease off the exercise if you start to have pain. Your doctor or physical therapist will tell you when you can start these exercises and which ones will work best for you. How to do the exercises Shoulder stretch 1.  a doorway and place one arm against the door frame. Your elbow should be a little higher than your shoulder. 2. Relax your shoulders as you lean forward, allowing your chest and shoulder muscles to stretch. You can also turn your body slightly away from your arm to stretch the muscles even more. 3. Hold for 15 to 30 seconds. 4. Repeat 2 to 4 times with each arm. Shoulder and chest stretch 1. Shoulder and chest stretch 2. While sitting, relax your upper body so you slump slightly in your chair. 3. As you breathe in, straighten your back and open your arms out to the sides. 4. Gently pull your shoulder blades back and downward. 5. Hold for 15 to 30 seconds as your breathe normally. 6. Repeat 2 to 4 times. Overhead stretch 1. Reach up over your head with both arms. 2. Hold for 15 to 30 seconds. 3. Repeat 2 to 4 times. Follow-up care is a key part of your treatment and safety. Be sure to make and go to all appointments, and call your doctor if you are having problems. It's also a good idea to know your test results and keep a list of the medicines you take. Where can you learn more? Go to http://eusebia-chuck.info/. Enter S254 in the search box to learn more about \"Shoulder Stretches: Exercises. \" Current as of: March 21, 2017 Content Version: 11.4 © 0781-9843 [employer]. Care instructions adapted under license by [employer] [place] (which disclaims liability or warranty for this information). If you have questions about a medical condition or this instruction, always ask your healthcare professional. [address] any warranty or liability for your use of this information. Rotator Cuff: Exercises Your Care Instructions Here are some examples of typical rehabilitation exercises for your condition. Start each exercise slowly. Ease off the exercise if you start to have pain. Your doctor or physical therapist will tell you when you can start these exercises and which ones will work best for you. How to do the exercises Pendulum swing If you have pain in your back, do not do this exercise. 5. Hold on to a table or the back of a chair with your good arm. Then bend forward a little and let your sore arm hang straight down. This exercise does not use the arm muscles. Rather, use your legs and your hips to create movement that makes your arm swing freely. 6. Use the movement from your hips and legs to guide the slightly swinging arm back and forth like a pendulum (or elephant trunk). Then guide it in circles that start small (about the size of a dinner plate). Make the circles a bit larger each day, as your pain allows. 7. Do this exercise for 5 minutes, 5 to 7 times each day. 8. As you have less pain, try bending over a little farther to do this exercise. This will increase the amount of movement at your shoulder. Posterior stretching exercise 7. Hold the elbow of your injured arm with your other hand. 8. Use your hand to pull your injured arm gently up and across your body. You will feel a gentle stretch across the back of your injured shoulder. 9. Hold for at least 15 to 30 seconds. Then slowly lower your arm. 10. Repeat 2 to 4 times. Up-the-back stretch Your doctor or physical therapist may want you to wait to do this stretch until you have regained most of your range of motion and strength. You can do this stretch in different ways. Hold any of these stretches for at least 15 to 30 seconds. Repeat them 2 to 4 times. 4. Put your hand in your back pocket. Let it rest there to stretch your shoulder. 5. With your other hand, hold your injured arm (palm outward) behind your back by the wrist. Pull your arm up gently to stretch your shoulder. 6. Next, put a towel over your other shoulder.  Put the hand of your injured arm behind your back. Now hold the back end of the towel. With the other hand, hold the front end of the towel in front of your body. Pull gently on the front end of the towel. This will bring your hand farther up your back to stretch your shoulder. Overhead stretch 1. Standing about an arm's length away, grasp onto a solid surface. You could use a countertop, a doorknob, or the back of a sturdy chair. 2. With your knees slightly bent, bend forward with your arms straight. Lower your upper body, and let your shoulders stretch. 3. As your shoulders are able to stretch farther, you may need to take a step or two backward. 4. Hold for at least 15 to 30 seconds. Then stand up and relax. If you had stepped back during your stretch, step forward so you can keep your hands on the solid surface. 5. Repeat 2 to 4 times. Shoulder flexion (lying down) To make a wand for this exercise, use a piece of PVC pipe or a broom handle with the broom removed. Make the wand about a foot wider than your shoulders. 1. Lie on your back, holding a wand with both hands. Your palms should face down as you hold the wand. 2. Keeping your elbows straight, slowly raise your arms over your head. Raise them until you feel a stretch in your shoulders, upper back, and chest. 
3. Hold for 15 to 30 seconds. 4. Repeat 2 to 4 times. Shoulder rotation (lying down) To make a wand for this exercise, use a piece of PVC pipe or a broom handle with the broom removed. Make the wand about a foot wider than your shoulders. 1. Lie on your back. Hold a wand with both hands with your elbows bent and palms up. 2. Keep your elbows close to your body, and move the wand across your body toward the sore arm. 3. Hold for 8 to 12 seconds. 4. Repeat 2 to 4 times. Wall climbing (to the side) Avoid any movement that is straight to your side, and be careful not to arch your back.  Your arm should stay about 30 degrees to the front of your side. 
1. Stand with your side to a wall so that your fingers can just touch it at an angle about 30 degrees toward the front of your body. 2. Walk the fingers of your injured arm up the wall as high as pain permits. Try not to shrug your shoulder up toward your ear as you move your arm up. 3. Hold that position for a count of at least 15 to 20. 
4. Walk your fingers back down to the starting position. 5. Repeat at least 2 to 4 times. Try to reach higher each time. Wall climbing (to the front) During this stretching exercise, be careful not to arch your back. 1. Face a wall, and stand so your fingers can just touch it. 2. Keeping your shoulder down, walk the fingers of your injured arm up the wall as high as pain permits. (Don't shrug your shoulder up toward your ear.) 3. Hold your arm in that position for at least 15 to 30 seconds. 4. Slowly walk your fingers back down to where you started. 5. Repeat at least 2 to 4 times. Try to reach higher each time. Shoulder blade squeeze 1. Stand with your arms at your sides, and squeeze your shoulder blades together. Do not raise your shoulders up as you squeeze. 2. Hold 6 seconds. 3. Repeat 8 to 12 times. Scapular exercise: Arm reach 1. Lie flat on your back. This exercise is a very slight motion that starts with your arms raised (elbows straight, arms straight). 2. From this position, reach higher toward the eliel or ceiling. Keep your elbows straight. All motion should be from your shoulder blade only. 3. Relax your arms back to where you started. 4. Repeat 8 to 12 times. Arm raise to the side During this strengthening exercise, your arm should stay about 30 degrees to the front of your side. 1. Slowly raise your injured arm to the side, with your thumb facing up. Raise your arm 60 degrees at the most (shoulder level is 90 degrees). 2. Hold the position for 3 to 5 seconds.  Then lower your arm back to your side. If you need to, bring your \"good\" arm across your body and place it under the elbow as you lower your injured arm. Use your good arm to keep your injured arm from dropping down too fast. 
3. Repeat 8 to 12 times. 4. When you first start out, don't hold any extra weight in your hand. As you get stronger, you may use a 1-pound to 2-pound dumbbell or a small can of food. Shoulder flexor and extensor exercise These are isometric exercises. That means you contract your muscles without actually moving. 1. Push forward (flex): Stand facing a wall or doorjamb, about 6 inches or less back. Hold your injured arm against your body. Make a closed fist with your thumb on top. Then gently push your hand forward into the wall with about 25% to 50% of your strength. Don't let your body move backward as you push. Hold for about 6 seconds. Relax for a few seconds. Repeat 8 to 12 times. 2. Push backward (extend): Stand with your back flat against a wall. Your upper arm should be against the wall, with your elbow bent 90 degrees (your hand straight ahead). Push your elbow gently back against the wall with about 25% to 50% of your strength. Don't let your body move forward as you push. Hold for about 6 seconds. Relax for a few seconds. Repeat 8 to 12 times. Scapular exercise: Wall push-ups This exercise is best done with your fingers somewhat turned out, rather than straight up and down. 1. Stand facing a wall, about 12 inches to 18 inches away. 2. Place your hands on the wall at shoulder height. 3. Slowly bend your elbows and bring your face to the wall. Keep your back and hips straight. 4. Push back to where you started. 5. Repeat 8 to 12 times. 6. When you can do this exercise against a wall comfortably, you can try it against a counter. You can then slowly progress to the end of a couch, then to a sturdy chair, and finally to the floor. Scapular exercise: Retraction For this exercise, you will need elastic exercise material, such as surgical tubing or Thera-Band. 1. Put the band around a solid object at about waist level. (A bedpost will work well.) Each hand should hold an end of the band. 2. With your elbows at your sides and bent to 90 degrees, pull the band back. Your shoulder blades should move toward each other. Then move your arms back where you started. 3. Repeat 8 to 12 times. 4. If you have good range of motion in your shoulders, try this exercise with your arms lifted out to the sides. Keep your elbows at a 90-degree angle. Raise the elastic band up to about shoulder level. Pull the band back to move your shoulder blades toward each other. Then move your arms back where you started. Internal rotator strengthening exercise 1. Start by tying a piece of elastic exercise material to a doorknob. You can use surgical tubing or Thera-Band. 2. Stand or sit with your shoulder relaxed and your elbow bent 90 degrees. Your upper arm should rest comfortably against your side. Squeeze a rolled towel between your elbow and your body for comfort. This will help keep your arm at your side. 3. Hold one end of the elastic band in the hand of the painful arm. 4. Slowly rotate your forearm toward your body until it touches your belly. Slowly move it back to where you started. 5. Keep your elbow and upper arm firmly tucked against the towel roll or at your side. 6. Repeat 8 to 12 times. External rotator strengthening exercise 1. Start by tying a piece of elastic exercise material to a doorknob. You can use surgical tubing or Thera-Band. (You may also hold one end of the band in each hand.) 2. Stand or sit with your shoulder relaxed and your elbow bent 90 degrees. Your upper arm should rest comfortably against your side. Squeeze a rolled towel between your elbow and your body for comfort. This will help keep your arm at your side. 3. Hold one end of the elastic band with the hand of the painful arm. 4. Start with your forearm across your belly. Slowly rotate the forearm out away from your body. Keep your elbow and upper arm tucked against the towel roll or the side of your body until you begin to feel tightness in your shoulder. Slowly move your arm back to where you started. 5. Repeat 8 to 12 times. Follow-up care is a key part of your treatment and safety. Be sure to make and go to all appointments, and call your doctor if you are having problems. It's also a good idea to know your test results and keep a list of the medicines you take. Where can you learn more? Go to http://eusebia-chuck.info/. Enter Robbie Coker in the search box to learn more about \"Rotator Cuff: Exercises. \" Current as of: March 21, 2017 Content Version: 11.4 © 2295-8950 [employer]. Care instructions adapted under license by [employer] (which disclaims liability or warranty for this information). If you have questions about a medical condition or this instruction, always ask your healthcare professional. [place] [address] any warranty or liability for your use of this information. Please provide this summary of care documentation to your next provider. Your primary care clinician is listed as LANRE Robles. If you have any questions after today's visit, please call [phone].

## 2018-06-25 NOTE — PROGRESS NOTES
Reviewed record in preparation for visit and have obtained necessary documentation. Identified pt with two pt identifiers(name and ). Chief Complaint   Patient presents with    Medication Evaluation     Pt nonfasting    Hypertension    ED Follow-up     [facility]    Medication Refill     wants 90 days       Health Maintenance Due   Topic Date Due    Stool testing for trace blood  2018       Ms. Kevin Vivas has a reminder for a \"due or due soon\" health maintenance. I have asked that she discuss this further with her primary care provider for follow-up on this health maintenance. Coordination of Care Questionnaire:  :     1) Have you been to an emergency room, urgent care clinic since your last visit? yes ED [facility]  Hospitalized since your last visit? no             2) Have you seen or consulted any other health care providers outside of [facility] since your last visit? no  (Include any pap smears or colon screenings in this section.)    3) In the event something were to happen to you and you were unable to speak on your behalf, do you have an Advance Directive/ Living Will in place stating your wishes? NO    Do you have an Advance Directive on file? no    4) Are you interested in receiving information on Advance Directives? NO    Patient is accompanied by self I have received verbal consent from Edie Goddard to discuss any/all medical information while they are present in the room.

## 2018-06-25 NOTE — PROGRESS NOTES
CC: Medication Evaluation (Pt nonfasting); Hypertension; ED Follow-up [facility], [facility]); and Medication Refill (wants 90 days)      HPI:     passed away 2 months ago and patient is experiencing grief. She continues to work full time \" staying busy helps\"   HTN : previously visit we increased benazepril to 30mg daily and patient reports BP was doing better. Since  passed away attributes higher BP. TOday BP is 136/94  Denies CP and dyspnea  -Take Lasix 3 times a week     Anxiety increased since death of . Taking Wellbutrin 150mg daily. Taking xanax at times to help with sleep. Ambien was not effective  Denies SI. Has good family support    ROS:  10 systems reviewed and negative other than HPI    Past Medical History:   Diagnosis Date    Arthritis     Hypertension     Osteopenia        Current Outpatient Prescriptions on File Prior to Visit   Medication Sig Dispense Refill    furosemide (LASIX) 20 mg tablet Take 1 Tab by mouth daily. 30 Tab 5    zolpidem (AMBIEN) 5 mg tablet Take 1 Tab by mouth nightly as needed for Sleep. Max Daily Amount: 5 mg. 10 Tab 0     No current facility-administered medications on file prior to visit. Past Surgical History:   Procedure Laterality Date    HX CHOLECYSTECTOMY      HX HEENT      HX ORTHOPAEDIC      Bilateral Knee Replacement       Family History   Problem Relation Age of Onset    Cancer Mother      pancreatic cancer    Cancer Brother      Reviewed and no changes     Social History     Social History    Marital status:      Spouse name: N/A    Number of children: N/A    Years of education: N/A     Occupational History    Not on file.      Social History Main Topics    Smoking status: Never Smoker    Smokeless tobacco: Never Used    Alcohol use No    Drug use: No    Sexual activity: Yes     Partners: Female     Birth control/ protection: None     Other Topics Concern    Not on file     Social History Narrative            Visit Vitals    BP (!) 136/94 (BP 1 Location: Left arm, BP Patient Position: Sitting)    Pulse 71    Temp 97.8 °F (36.6 °C) (Oral)    Resp 18    Ht 4' 11\" (1.499 m)    Wt 262 lb 12.8 oz (119.2 kg)    LMP  (LMP Unknown)    SpO2 98%    BMI 53.08 kg/m2       Physical Examination:   General - Well appearing female  HEENT - PERRL, TM no erythema/opacification, normal nasal turbinates, oropharynx no erythema or exudate, MMM  Neck - supple, no bruits, no TMG, no LAD  Pulm - clear to auscultation bilaterally  Cardio - RRR, normal S1 S2, no murmur gallops or rubs  Abd - soft, nontender, no masses, no HSM  Extrem - no edema, +2 distal pulses  Psych - normal affect, appropriate mood    Lab Results   Component Value Date/Time    WBC 7.7 03/03/2016 11:52 AM    HGB 14.6 03/03/2016 11:52 AM    HCT 43.4 03/03/2016 11:52 AM    PLATELET 960 47/02/9022 11:52 AM    MCV 82 03/03/2016 11:52 AM     Lab Results   Component Value Date/Time    Sodium 142 08/15/2017 08:45 AM    Potassium 4.6 08/15/2017 08:45 AM    Chloride 103 08/15/2017 08:45 AM    CO2 23 08/15/2017 08:45 AM    Anion gap 6 01/30/2015 10:45 AM    Glucose 100 (H) 08/15/2017 08:45 AM    BUN 17 08/15/2017 08:45 AM    Creatinine 0.73 08/15/2017 08:45 AM    BUN/Creatinine ratio 23 08/15/2017 08:45 AM    GFR est AA 99 08/15/2017 08:45 AM    GFR est non-AA 85 08/15/2017 08:45 AM    Calcium 9.1 08/15/2017 08:45 AM     Lab Results   Component Value Date/Time    Cholesterol, total 206 (H) 07/18/2017 09:32 AM    HDL Cholesterol 57 07/18/2017 09:32 AM    LDL, calculated 134 (H) 07/18/2017 09:32 AM    VLDL, calculated 15 07/18/2017 09:32 AM    Triglyceride 77 07/18/2017 09:32 AM     Lab Results   Component Value Date/Time    TSH 1.600 03/03/2016 11:52 AM     No results found for: PSA, Milla Loly, PSAR3, [id number], [id number], PSALT  Lab Results   Component Value Date/Time    Hemoglobin A1c 5.6 07/18/2017 09:32 AM    Hemoglobin A1c (POC) 5.8 05/27/2016 10:00 AM     No results found for: Vincent Tolentino, XQVID3, XQVID, [place]    Lab Results   Component Value Date/Time    ALT (SGPT) 17 08/15/2017 08:45 AM    AST (SGOT) 15 08/15/2017 08:45 AM    Alk. phosphatase 83 08/15/2017 08:45 AM    Bilirubin, total 0.7 08/15/2017 08:45 AM          This is the Subsequent Medicare Annual Wellness Exam, performed 12 months or more after the Initial AWV or the last Subsequent AWV    I have reviewed the patient's medical history in detail and updated the computerized patient record. Depression Risk Factor Screening:     PHQ over the last two weeks 6/25/2018   Little interest or pleasure in doing things Nearly every day   Feeling down, depressed or hopeless Nearly every day   Total Score PHQ 2 6     Alcohol Risk Factor Screening: You do not drink alcohol or very rarely. Functional Ability and Level of Safety:   Hearing Loss  Hearing is good. Activities of Daily Living  The home contains: no safety equipment. Patient does total self care   Works full time     Fall Risk  Fall Risk Assessment, last 12 mths 6/25/2018   Able to walk? Yes   Fall in past 12 months? No   Fall with injury? -   Number of falls in past 12 months -   Fall Risk Score -       Abuse Screen  Patient is not abused    Cognitive Screening   Evaluation of Cognitive Function:  Has your family/caregiver stated any concerns about your memory: no  Normal    Patient Care Team   Patient Care Team:  Jeri Borrero MD as PCP - General (Internal Medicine)    Assessment/Plan   Education and counseling provided:  Are appropriate based on today's review and evaluation     Diagnoses and all orders for this visit:    1. Anxiety: exacerbated by the death of her  2 months ago. Will increase Wellbutrin to 300 mg. Xanax PRN to not exceed one a day  -     buPROPion XL (WELLBUTRIN XL) 300 mg XL tablet; Take 1 Tab by mouth every morning.  -     ALPRAZolam (XANAX) 0.25 mg tablet; TAKE ONE TABLET BY MOUTH ONCE DAILY AS NEEDED FOR ANXIETY    2. Essential hypertension: blood pressure is mildly elevated today, patient is anxious / sad with recent death of . Will continue benazepril 30mg daily  -     benazepril (LOTENSIN) 20 mg tablet; TAKE ONE TABLET BY MOUTH ONCE DAILY  -     benazepril (LOTENSIN) 10 mg tablet; Take 1 Tab by mouth daily.  -     METABOLIC PANEL, COMPREHENSIVE  -     CBC WITH AUTOMATED DIFF    3. Gastroesophageal reflux disease without esophagitis  -     omeprazole (PRILOSEC) 20 mg capsule; Take 1 Cap by mouth daily. 4. Overactive bladder  -     mirabegron ER (MYRBETRIQ) 25 mg ER tablet; Take 1 Tab by mouth daily. 5. Morbid obesity due to excess calories ([address]    6. Right arm pain: given stretches and exercises. Patient declined PT  -     cyclobenzaprine (FLEXERIL) 5 mg tablet; Take 1 Tab by mouth three (3) times daily as needed for Muscle Spasm(s). 7. Grief  -     ALPRAZolam (XANAX) 0.25 mg tablet; TAKE ONE TABLET BY MOUTH ONCE DAILY AS NEEDED FOR ANXIETY    8. Low vitamin D level  -     VITAMIN D, 25 HYDROXY    9. Screening for colon cancer  -     COLOGUARD TEST (FECAL DNA COLORECTAL CANCER SCREENING)    10.  Medicare annual wellness visit, subsequent        Health Maintenance Due   Topic Date Due    Janina Q 3 Years  06/[birth year]/2000

## 2018-06-26 LAB
25(OH)D3+25(OH)D2 SERPL-MCNC: 16.8 NG/ML (ref 30–100)
ALBUMIN SERPL-MCNC: 4.1 G/DL (ref 3.6–4.8)
ALBUMIN/GLOB SERPL: 1.6 {RATIO} (ref 1.2–2.2)
ALP SERPL-CCNC: 91 IU/L (ref 39–117)
ALT SERPL-CCNC: 19 IU/L (ref 0–32)
AST SERPL-CCNC: 18 IU/L (ref 0–40)
BASOPHILS # BLD AUTO: 0 X10E3/UL (ref 0–0.2)
BASOPHILS NFR BLD AUTO: 0 %
BILIRUB SERPL-MCNC: 0.4 MG/DL (ref 0–1.2)
BUN SERPL-MCNC: 19 MG/DL (ref 8–27)
BUN/CREAT SERPL: 23 (ref 12–28)
CALCIUM SERPL-MCNC: 9.3 MG/DL (ref 8.7–10.3)
CHLORIDE SERPL-SCNC: 106 MMOL/L (ref 96–106)
CO2 SERPL-SCNC: 20 MMOL/L (ref 20–29)
CREAT SERPL-MCNC: 0.84 MG/DL (ref 0.57–1)
EOSINOPHIL # BLD AUTO: 0.5 X10E3/UL (ref 0–0.4)
EOSINOPHIL NFR BLD AUTO: 7 %
ERYTHROCYTE [DISTWIDTH] IN BLOOD BY AUTOMATED COUNT: 14.1 % (ref 12.3–15.4)
GFR SERPLBLD CREATININE-BSD FMLA CKD-EPI: 72 ML/MIN/1.73
GFR SERPLBLD CREATININE-BSD FMLA CKD-EPI: 83 ML/MIN/1.73
GLOBULIN SER CALC-MCNC: 2.5 G/DL (ref 1.5–4.5)
GLUCOSE SERPL-MCNC: 101 MG/DL (ref 65–99)
HCT VFR BLD AUTO: 46 % (ref 34–46.6)
HGB BLD-MCNC: 15.1 G/DL (ref 11.1–15.9)
IMM GRANULOCYTES # BLD: 0 X10E3/UL (ref 0–0.1)
IMM GRANULOCYTES NFR BLD: 0 %
LYMPHOCYTES # BLD AUTO: 1.6 X10E3/UL (ref 0.7–3.1)
LYMPHOCYTES NFR BLD AUTO: 22 %
MCH RBC QN AUTO: 27.2 PG (ref 26.6–33)
MCHC RBC AUTO-ENTMCNC: 32.8 G/DL (ref 31.5–35.7)
MCV RBC AUTO: 83 FL (ref 79–97)
MONOCYTES # BLD AUTO: 0.6 X10E3/UL (ref 0.1–0.9)
MONOCYTES NFR BLD AUTO: 8 %
NEUTROPHILS # BLD AUTO: 4.4 X10E3/UL (ref 1.4–7)
NEUTROPHILS NFR BLD AUTO: 63 %
PLATELET # BLD AUTO: 210 X10E3/UL (ref 150–379)
POTASSIUM SERPL-SCNC: 4.8 MMOL/L (ref 3.5–5.2)
PROT SERPL-MCNC: 6.6 G/DL (ref 6–8.5)
RBC # BLD AUTO: 5.56 X10E6/UL (ref 3.77–5.28)
SODIUM SERPL-SCNC: 141 MMOL/L (ref 134–144)
WBC # BLD AUTO: 7.1 X10E3/UL (ref 3.4–10.8)

## 2018-07-03 RX ORDER — ERGOCALCIFEROL 1.25 MG/1
50000 CAPSULE ORAL
Qty: 12 CAP | Refills: 0 | Status: SHIPPED | OUTPATIENT
Start: 2018-07-03 | End: 2018-09-24 | Stop reason: SDUPTHER

## 2018-07-03 NOTE — PROGRESS NOTES
Kidney and liver function  Normal blood count  Vitamin D is low  -Recommend vitamin D loading dose of 50,000 iu  once a week for 12 weeks, once done with loading dose, take 2,000 iu vitamin D3 once a day over the counter to maintain level.

## 2018-08-20 DIAGNOSIS — E66.01 MORBID OBESITY DUE TO EXCESS CALORIES (HCC): ICD-10-CM

## 2018-08-20 DIAGNOSIS — F41.9 ANXIETY: ICD-10-CM

## 2018-08-20 RX ORDER — BUPROPION HYDROCHLORIDE 150 MG/1
TABLET ORAL
Qty: 30 TAB | Refills: 5 | OUTPATIENT
Start: 2018-08-20

## 2018-08-22 RX ORDER — BUPROPION HYDROCHLORIDE 300 MG/1
300 TABLET ORAL
Qty: 90 TAB | Refills: 1 | Status: SHIPPED | OUTPATIENT
Start: 2018-08-22 | End: 2019-01-08 | Stop reason: DRUGHIGH

## 2018-08-22 NOTE — TELEPHONE ENCOUNTER
Spoke with patient. Patient states that she is taking the 300mg. Advised patient that I will send the request for the 300mg to Dr. Subha Campos for approval.  Pt verbalized understanding of information discussed w/ no further questions at this time.

## 2018-09-24 DIAGNOSIS — R79.89 LOW VITAMIN D LEVEL: ICD-10-CM

## 2018-09-24 RX ORDER — ERGOCALCIFEROL 1.25 MG/1
CAPSULE ORAL
Qty: 12 CAP | Refills: 0 | Status: SHIPPED | OUTPATIENT
Start: 2018-09-24 | End: 2018-12-30 | Stop reason: SDUPTHER

## 2018-10-08 ENCOUNTER — HOSPITAL ENCOUNTER (OUTPATIENT)
Dept: MAMMOGRAPHY | Age: 68
Discharge: HOME | End: 2018-10-08
Attending: INTERNAL MEDICINE
Payer: MEDICARE

## 2018-10-08 DIAGNOSIS — Z12.39 SCREENING BREAST EXAMINATION: ICD-10-CM

## 2018-10-08 PROCEDURE — 77067 SCR MAMMO BI INCL CAD: CPT

## 2018-11-06 ENCOUNTER — TELEPHONE (OUTPATIENT)
Dept: INTERNAL MEDICINE CLINIC | Age: 68
End: 2018-11-06

## 2018-11-06 DIAGNOSIS — N32.81 OAB (OVERACTIVE BLADDER): Primary | ICD-10-CM

## 2018-11-06 RX ORDER — SOLIFENACIN SUCCINATE 5 MG/1
5 TABLET, FILM COATED ORAL DAILY
Qty: 30 TAB | Refills: 1 | Status: SHIPPED | OUTPATIENT
Start: 2018-11-06 | End: 2018-11-12

## 2018-11-06 NOTE — TELEPHONE ENCOUNTER
Pt would like a callback in regards to Rx \"Medetrix\", stated the cost was $128 and could not afford the Rx. She would like to know if any samples are available or if there is something else that can be prescribed. Best contact [phone], please leave a voicemail.       Copy/paste [place]

## 2018-11-07 ENCOUNTER — OFFICE VISIT (OUTPATIENT)
Dept: INTERNAL MEDICINE CLINIC | Age: 68
End: 2018-11-07

## 2018-11-07 VITALS
HEIGHT: 59 IN | TEMPERATURE: 98.4 F | RESPIRATION RATE: 16 BRPM | OXYGEN SATURATION: 96 % | BODY MASS INDEX: 54.03 KG/M2 | HEART RATE: 86 BPM | WEIGHT: 268 LBS | SYSTOLIC BLOOD PRESSURE: 148 MMHG | DIASTOLIC BLOOD PRESSURE: 94 MMHG

## 2018-11-07 DIAGNOSIS — M25.512 LEFT SHOULDER PAIN, UNSPECIFIED CHRONICITY: ICD-10-CM
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
## 2018-11-07 DIAGNOSIS — M79.601 RIGHT UPPER LIMB PAIN: ICD-10-CM

## 2018-11-07 DIAGNOSIS — M25.511 ACUTE PAIN OF BOTH SHOULDERS: ICD-10-CM

## 2018-11-07 DIAGNOSIS — M25.512 ACUTE PAIN OF BOTH SHOULDERS: ICD-10-CM

## 2018-11-07 DIAGNOSIS — M54.2 CERVICALGIA: Primary | ICD-10-CM

## 2018-11-07 DIAGNOSIS — I10 ESSENTIAL HYPERTENSION: ICD-10-CM

## 2018-11-07 DIAGNOSIS — M25.511 RIGHT SHOULDER PAIN, UNSPECIFIED CHRONICITY: ICD-10-CM

## 2018-11-07 DIAGNOSIS — M54.2 NECK PAIN: Primary | ICD-10-CM

## 2018-11-07 DIAGNOSIS — M79.601 RIGHT ARM PAIN: ICD-10-CM

## 2018-11-07 RX ORDER — CYCLOBENZAPRINE HCL 5 MG
5 TABLET ORAL
Qty: 30 TAB | Refills: 1 | Status: SHIPPED | OUTPATIENT
Start: 2018-11-07 | End: 2018-12-30 | Stop reason: SDUPTHER

## 2018-11-07 RX ORDER — METHYLPREDNISOLONE 4 MG/1
TABLET ORAL
Qty: 1 DOSE PACK | Refills: 0 | Status: SHIPPED | OUTPATIENT
Start: 2018-11-07 | End: 2019-01-08 | Stop reason: ALTCHOICE
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
## 2018-11-12 ENCOUNTER — TELEPHONE (OUTPATIENT)
Dept: INTERNAL MEDICINE CLINIC | Age: 68
End: 2018-11-12

## 2018-11-12 DIAGNOSIS — N32.81 OAB (OVERACTIVE BLADDER): Primary | ICD-10-CM

## 2018-11-12 RX ORDER — OXYBUTYNIN CHLORIDE 10 MG/1
10 TABLET, EXTENDED RELEASE ORAL DAILY
Qty: 30 TAB | Refills: 3 | Status: SHIPPED | OUTPATIENT
Start: 2018-11-12 | End: 2019-03-16 | Stop reason: SDUPTHER

## 2018-11-12 NOTE — TELEPHONE ENCOUNTER
Pt called to speak with someone about getting a less expensive Rx sent over to [address] [place] [address]. She called before and her Rx was over $130, they sent over another Rx and it was the same price. She cannot afford it and is asking if they have a sample for two or so months just until January.     Call back at [phone]

## 2018-11-12 NOTE — TELEPHONE ENCOUNTER
Niya Morrison MD 1 hour ago (1:09 PM)         Prescription for oxybutinin sent long acting once a day which hopefully will have less side effects then TID short acting dose. Spoke with patient. Two pt identifiers confirmed. Patient advised of the above information from Dr. Nidia Velazquez. Pt verbalized understanding of information discussed w/ no further questions at this time.

## 2018-11-12 NOTE — TELEPHONE ENCOUNTER
Spoke with patient. Two pt identifiers confirmed. Patient states that she could not afford the mybetriq because it was going to cost her $130. Patient states that a prescription for vesicare was sent to her pharmacy as a replacement, but that it going to cost her the same amount. Patient states that she was taken off the oxybutynin because she was experiencing dry mouth. Patient states that she can deal with the dry mouth for the next couple of months until she is able to get back on the mybretriq. Patient would like to know if Dr. Nidia Velazquez would be willing to put her back on the oxybutynin. Advised patient that I will pass her request on to Dr. Nidia Velazquez and will call her if there are any issues with her request.  Pt verbalized understanding of information discussed w/ no further questions at this time.

## 2018-11-12 NOTE — TELEPHONE ENCOUNTER
Prescription for oxybutinin sent long acting once a day which hopefully will have less side effects then TID short acting dose.

## 2018-12-30 DIAGNOSIS — M79.601 RIGHT ARM PAIN: ICD-10-CM

## 2018-12-30 DIAGNOSIS — R79.89 LOW VITAMIN D LEVEL: ICD-10-CM

## 2018-12-30 RX ORDER — CYCLOBENZAPRINE HCL 5 MG
TABLET ORAL
Qty: 30 TAB | Refills: 1 | Status: SHIPPED | OUTPATIENT
Start: 2018-12-30 | End: 2019-03-16 | Stop reason: SDUPTHER

## 2018-12-31 RX ORDER — ERGOCALCIFEROL 1.25 MG/1
CAPSULE ORAL
Qty: 12 CAP | Refills: 0 | Status: SHIPPED | OUTPATIENT
Start: 2018-12-31 | End: 2019-04-15 | Stop reason: SDUPTHER

## 2019-01-08 ENCOUNTER — OFFICE VISIT (OUTPATIENT)
Dept: INTERNAL MEDICINE CLINIC | Age: 69
End: 2019-01-08

## 2019-01-08 VITALS
SYSTOLIC BLOOD PRESSURE: 159 MMHG | RESPIRATION RATE: 18 BRPM | TEMPERATURE: 97.9 F | DIASTOLIC BLOOD PRESSURE: 92 MMHG | BODY MASS INDEX: 54.03 KG/M2 | WEIGHT: 268 LBS | HEIGHT: 59 IN | OXYGEN SATURATION: 99 % | HEART RATE: 92 BPM

## 2019-01-08 DIAGNOSIS — E78.00 ELEVATED CHOLESTEROL: ICD-10-CM

## 2019-01-08 DIAGNOSIS — K21.9 GASTROESOPHAGEAL REFLUX DISEASE WITHOUT ESOPHAGITIS: ICD-10-CM

## 2019-01-08 DIAGNOSIS — R79.89 LOW VITAMIN D LEVEL: ICD-10-CM

## 2019-01-08 DIAGNOSIS — I10 ESSENTIAL HYPERTENSION: ICD-10-CM

## 2019-01-08 DIAGNOSIS — F41.9 ANXIETY: Primary | ICD-10-CM

## 2019-01-08 RX ORDER — RANITIDINE 150 MG/1
150 TABLET, FILM COATED ORAL 2 TIMES DAILY
Qty: 60 TAB | Refills: 1 | Status: SHIPPED | OUTPATIENT
Start: 2019-01-08 | End: 2020-06-01

## 2019-01-08 RX ORDER — BUPROPION HYDROCHLORIDE 150 MG/1
150 TABLET ORAL
Qty: 30 TAB | Refills: 0 | Status: SHIPPED | OUTPATIENT
Start: 2019-01-08 | End: 2019-01-28 | Stop reason: SDUPTHER

## 2019-01-08 NOTE — PROGRESS NOTES
Reviewed record in preparation for visit and have obtained necessary documentation. Identified pt with two pt identifiers(name and ). Chief Complaint   Patient presents with    Hypertension       Health Maintenance Due   Topic Date Due    Shingles Vaccine (1 of 2) 2000    Glaucoma Screening   01/10/2019       Ms. Cesilia King has a reminder for a \"due or due soon\" health maintenance. I have asked that she discuss this further with her primary care provider for follow-up on this health maintenance. Coordination of Care Questionnaire:  :     1) Have you been to an emergency room, urgent care clinic since your last visit? no   Hospitalized since your last visit? no             2) Have you seen or consulted any other health care providers outside of [facility] [place] since your last visit? no  (Include any pap smears or colon screenings in this section.)    3) In the event something were to happen to you and you were unable to speak on your behalf, do you have an Advance Directive/ Living Will in place stating your wishes? NO    Do you have an Advance Directive on file? no    4) Are you interested in receiving information on Advance Directives? NO    Patient is accompanied by self I have received verbal consent from David Tuttle to discuss any/all medical information while they are present in the room.

## 2019-01-08 NOTE — PATIENT INSTRUCTIONS
Lets decrease the dose of wellbutrin and see if your blood pressure improves.    Return for nurse visit for blood pressure

## 2019-01-08 NOTE — PROGRESS NOTES
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 Recall we had increased Wellbutrin to 300 mg long acting and patient notes higher BP since then    Patient's  passed away  April 2018    HTN : previously  we increased benazepril to 30mg daily . BLood pressure has been elevated as stated above. Patient is not taking lasix consistently. Patient is not exercising  Does report eating a healthy diet    Anxiety much better since quitting job. Patient is currently on wellbutrin 300mg and feels that she can decrease to 150mg and hopes that will help BP      ROS:  10 systems reviewed and negative other than HPI    Past Medical History:   Diagnosis Date    Arthritis     Hypertension     Osteopenia        Current Outpatient Medications on File Prior to Visit   Medication Sig Dispense Refill    VITAMIN D2 50,000 unit capsule TAKE 1 TABLET BY MOUTH ONCE EVERY 7 DAYS 12 Cap 0    cyclobenzaprine (FLEXERIL) 5 mg tablet TAKE 1 TABLET BY MOUTH THREE TIMES DAILY AS NEEDED FOR  MUSCLE  SPASM(S) 30 Tab 1    oxybutynin chloride XL (DITROPAN XL) 10 mg CR tablet Take 1 Tab by mouth daily. 30 Tab 3    buPROPion XL (WELLBUTRIN XL) 300 mg XL tablet Take 1 Tab by mouth every morning. 90 Tab 1    benazepril (LOTENSIN) 20 mg tablet TAKE ONE TABLET BY MOUTH ONCE DAILY 90 Tab 1    benazepril (LOTENSIN) 10 mg tablet Take 1 Tab by mouth daily. 90 Tab 1    omeprazole (PRILOSEC) 20 mg capsule Take 1 Cap by mouth daily. 90 Cap 1    ALPRAZolam (XANAX) 0.25 mg tablet TAKE ONE TABLET BY MOUTH ONCE DAILY AS NEEDED FOR ANXIETY 30 Tab 1    furosemide (LASIX) 20 mg tablet Take 1 Tab by mouth daily. 30 Tab 5    methylPREDNISolone (MEDROL DOSEPACK) 4 mg tablet Per pack 1 Dose Pack 0    zolpidem (AMBIEN) 5 mg tablet Take 1 Tab by mouth nightly as needed for Sleep.  Max Daily Amount: 5 mg. 10 Tab 0     No current facility-administered medications on file prior to visit.         Past Surgical History:   Procedure Laterality Date    HX CHOLECYSTECTOMY      HX HEENT      HX ORTHOPAEDIC      Bilateral Knee Replacement       Family History   Problem Relation Age of Onset    Cancer Mother         pancreatic cancer    Cancer Brother      Reviewed and no changes     Social History     Socioeconomic History    Marital status:      Spouse name: Not on file    Number of children: Not on file    Years of education: Not on file    Highest education level: Not on file   Social Needs    Financial resource strain: Not on file    Food insecurity - worry: Not on file    Food insecurity - inability: Not on file   [employer] needs - medical: Not on file   [employer] needs - non-medical: Not on file   Occupational History    Not on file   Tobacco Use    Smoking status: Never Smoker    Smokeless tobacco: Never Used   Substance and Sexual Activity    Alcohol use: No     Alcohol/week: 0.0 oz    Drug use: No    Sexual activity: Yes     Partners: Female     Birth control/protection: None   Other Topics Concern    Not on file   Social History Narrative    Not on file            Visit Vitals  BP (!) 159/92 (BP 1 Location: Right arm, BP Patient Position: Sitting)   Pulse 92   Temp 97.9 °F (36.6 °C) (Oral)   Resp 18   Ht 4' 11\" (1.499 m)   Wt 268 lb (121.6 kg)   LMP  (LMP Unknown)   SpO2 99%   BMI 54.13 kg/m²       Physical Examination:   General - Well appearing female, morbid obesity  HEENT - PERRL, TM no erythema/opacification, normal nasal turbinates, oropharynx no erythema or exudate, MMM  Neck - supple, no bruits, no TMG, no LAD  Pulm - clear to auscultation bilaterally  Cardio - RRR, normal S1 S2, no murmur gallops or rubs  Abd - soft, nontender, no masses, no HSM  Extrem - no edema, +2 distal pulses  Psych - normal affect, appropriate mood    Lab Results   Component Value Date/Time    WBC 7.1 06/25/2018 08:46 AM    HGB 15.1 06/25/2018 08:46 AM    HCT 46.0 06/25/2018 08:46 AM    PLATELET 816 24/62/2775 08:46 AM    MCV 83 06/25/2018 08:46 AM     Lab Results   Component Value Date/Time    Sodium 141 06/25/2018 08:46 AM    Potassium 4.8 06/25/2018 08:46 AM    Chloride 106 06/25/2018 08:46 AM    CO2 20 06/25/2018 08:46 AM    Anion gap 6 01/30/2015 10:45 AM    Glucose 101 (H) 06/25/2018 08:46 AM    BUN 19 06/25/2018 08:46 AM    Creatinine 0.84 06/25/2018 08:46 AM    BUN/Creatinine ratio 23 06/25/2018 08:46 AM    GFR est AA 83 06/25/2018 08:46 AM    GFR est non-AA 72 06/25/2018 08:46 AM    Calcium 9.3 06/25/2018 08:46 AM     Lab Results   Component Value Date/Time    Cholesterol, total 206 (H) 07/18/2017 09:32 AM    HDL Cholesterol 57 07/18/2017 09:32 AM    LDL, calculated 134 (H) 07/18/2017 09:32 AM    VLDL, calculated 15 07/18/2017 09:32 AM    Triglyceride 77 07/18/2017 09:32 AM     Lab Results   Component Value Date/Time    TSH 1.600 03/03/2016 11:52 AM     No results found for: PSA, PSA2, PSAR1, [place], PSAR3, [id number], [id number], PSALT  Lab Results   Component Value Date/Time    Hemoglobin A1c 5.6 07/18/2017 09:32 AM    Hemoglobin A1c (POC) 5.8 05/27/2016 10:00 AM     Lab Results   Component Value Date/Time    VITAMIN D, 25-HYDROXY 16.8 (L) 06/25/2018 08:46 AM       Lab Results   Component Value Date/Time    ALT (SGPT) 19 06/25/2018 08:46 AM    AST (SGOT) 18 06/25/2018 08:46 AM    Alk. phosphatase 91 06/25/2018 08:46 AM    Bilirubin, total 0.4 06/25/2018 08:46 AM        Assessment and Plan    1. Anxiety  - better, since change in jobs.  passed away 9 months ago  - buPROPion XL (WELLBUTRIN XL) 150 mg tablet; Take 1 Tab by mouth every morning. Dispense: 30 Tab; Refill: 0  - METABOLIC PANEL, COMPREHENSIVE  - CBC WITH AUTOMATED DIFF    2. Gastroesophageal reflux disease without esophagitis  -s top PPI, discussed long term side effects  - raNITIdine (ZANTAC) 150 mg tablet; Take 1 Tab by mouth two (2) times a day. Dispense: 60 Tab;  Refill: 1    3. Essential hypertension  - blood pressure is elevated will lower wellbutrin and continue 30mg of benazepril and hopefully BP will improve  - METABOLIC PANEL, COMPREHENSIVE  - CBC WITH AUTOMATED DIFF    4. Low vitamin D level - on 5000 weekly    5.  Overactive bladder: patient is on oxybutinin ( feels it worked better than myrbetriq)     Morbid obesity Discussed need / benefits for daily exercise and healthy diet

## 2019-01-28 DIAGNOSIS — I10 ESSENTIAL HYPERTENSION: ICD-10-CM

## 2019-01-28 DIAGNOSIS — F43.21 GRIEF: ICD-10-CM

## 2019-01-28 DIAGNOSIS — F41.9 ANXIETY: ICD-10-CM

## 2019-01-28 RX ORDER — BENAZEPRIL HYDROCHLORIDE 20 MG/1
TABLET ORAL
Qty: 90 TAB | Refills: 1 | Status: SHIPPED | OUTPATIENT
Start: 2019-01-28 | End: 2019-09-21 | Stop reason: SDUPTHER

## 2019-01-28 RX ORDER — ALPRAZOLAM 0.25 MG/1
TABLET ORAL
Qty: 30 TAB | Refills: 1 | Status: SHIPPED | OUTPATIENT
Start: 2019-01-28 | End: 2019-09-12 | Stop reason: SDUPTHER

## 2019-01-28 RX ORDER — BUPROPION HYDROCHLORIDE 150 MG/1
TABLET ORAL
Qty: 30 TAB | Refills: 0 | Status: SHIPPED | OUTPATIENT
Start: 2019-01-28 | End: 2019-03-16 | Stop reason: SDUPTHER

## 2019-02-07 ENCOUNTER — TELEPHONE (OUTPATIENT)
Dept: INTERNAL MEDICINE CLINIC | Age: 69
End: 2019-02-07

## 2019-02-07 DIAGNOSIS — I10 ESSENTIAL HYPERTENSION: ICD-10-CM

## 2019-02-07 NOTE — TELEPHONE ENCOUNTER
Patient states she needs a call back to get an appt today or tomorrow for back pain possible pinched nerve. 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 Please call.  Thank you

## 2019-02-07 NOTE — TELEPHONE ENCOUNTER
Pt is requesting a call back regarding a missed call from the practice. Best contact is [phone].        Message received & copied from [facility]

## 2019-02-07 NOTE — TELEPHONE ENCOUNTER
Spoke with patient. Two pt identifiers confirmed. Patient states that she only wants to see Dr. Renetta Mars. Patient advised that Dr. Renetta Mars is out of the office this week. Patient offered an appointment on 02/11/19 at 3:30pm, patient declined stating that 3:30pm is too late for her. Advised patient that the only availability next week is going to be after 3pm.  Patient states that she will wait to see how she feels over the weekend and will call next week if needed. Pt verbalized understanding of information discussed w/ no further questions at this time.

## 2019-02-08 RX ORDER — BENAZEPRIL HYDROCHLORIDE 10 MG/1
TABLET ORAL
Qty: 90 TAB | Refills: 1 | Status: SHIPPED | OUTPATIENT
Start: 2019-02-08 | End: 2020-01-17

## 2019-02-08 NOTE — TELEPHONE ENCOUNTER
PCP: Theodore Elliott MD    Last appt: 1/8/2019  No future appointments.     Requested Prescriptions     Pending Prescriptions Disp Refills    benazepril (LOTENSIN) 10 mg tablet [Pharmacy Med Name: BENAZEPRIL 10MG     TAB] 90 Tab 1     Sig: TAKE 1 TABLET BY MOUTH ONCE DAILY

## 2019-03-16 DIAGNOSIS — M79.601 RIGHT ARM PAIN: ICD-10-CM

## 2019-03-16 DIAGNOSIS — N32.81 OAB (OVERACTIVE BLADDER): ICD-10-CM

## 2019-03-16 DIAGNOSIS — F41.9 ANXIETY: ICD-10-CM

## 2019-03-18 RX ORDER — BUPROPION HYDROCHLORIDE 150 MG/1
TABLET ORAL
Qty: 30 TAB | Refills: 0 | Status: SHIPPED | OUTPATIENT
Start: 2019-03-18 | End: 2019-04-15 | Stop reason: SDUPTHER

## 2019-03-18 RX ORDER — CYCLOBENZAPRINE HCL 5 MG
TABLET ORAL
Qty: 30 TAB | Refills: 1 | Status: SHIPPED | OUTPATIENT
Start: 2019-03-18 | End: 2019-06-07 | Stop reason: SDUPTHER

## 2019-03-18 RX ORDER — OXYBUTYNIN CHLORIDE 10 MG/1
TABLET, EXTENDED RELEASE ORAL
Qty: 30 TAB | Refills: 3 | Status: SHIPPED | OUTPATIENT
Start: 2019-03-18 | End: 2019-07-13 | Stop reason: SDUPTHER

## 2019-04-15 DIAGNOSIS — F41.9 ANXIETY: ICD-10-CM

## 2019-04-15 DIAGNOSIS — R79.89 LOW VITAMIN D LEVEL: ICD-10-CM

## 2019-04-15 RX ORDER — ERGOCALCIFEROL 1.25 MG/1
CAPSULE ORAL
Qty: 12 CAP | Refills: 0 | Status: SHIPPED | OUTPATIENT
Start: 2019-04-15 | End: 2019-08-26 | Stop reason: SDUPTHER

## 2019-04-15 RX ORDER — BUPROPION HYDROCHLORIDE 150 MG/1
TABLET ORAL
Qty: 30 TAB | Refills: 0 | Status: SHIPPED | OUTPATIENT
Start: 2019-04-15 | End: 2019-05-19 | Stop reason: SDUPTHER

## 2019-05-29 ENCOUNTER — TELEPHONE (OUTPATIENT)
Dept: INTERNAL MEDICINE CLINIC | Age: 69
End: 2019-05-29

## 2019-05-29 NOTE — TELEPHONE ENCOUNTER
Patient is requesting to schedule a follow-up appointment preferably on Monday 6/03 or on Friday 06/07. Unable to find an appointment within the patient timeframe.      C/P Mono

## 2019-05-30 NOTE — TELEPHONE ENCOUNTER
Pt returning a missed call. Best contact [phone].        Message received & copied from [facility] after closing on 5/29/19

## 2019-05-30 NOTE — TELEPHONE ENCOUNTER
Spoke with patient. Two pt identifiers confirmed. Patient states that the only time that she is available this week and next week is Friday afternoons after 3:30pm.  Advised patient that Dr. Salbador Glover does not have any availability at this time that will work with patients schedule. Patient states that she will call back once she gets her new schedule.

## 2019-06-07 DIAGNOSIS — M79.601 RIGHT ARM PAIN: ICD-10-CM

## 2019-06-07 RX ORDER — CYCLOBENZAPRINE HCL 5 MG
TABLET ORAL
Qty: 30 TAB | Refills: 1 | Status: SHIPPED | OUTPATIENT
Start: 2019-06-07 | End: 2019-09-12 | Stop reason: SDUPTHER

## 2019-06-19 ENCOUNTER — HOSPITAL ENCOUNTER (EMERGENCY)
Age: 69
Discharge: HOME | End: 2019-06-19
Attending: EMERGENCY MEDICINE
Payer: MEDICARE

## 2019-06-19 ENCOUNTER — APPOINTMENT (OUTPATIENT)
Dept: GENERAL RADIOLOGY | Age: 69
End: 2019-06-19
Attending: EMERGENCY MEDICINE
Payer: MEDICARE

## 2019-06-19 ENCOUNTER — TELEPHONE (OUTPATIENT)
Dept: INTERNAL MEDICINE CLINIC | Age: 69
End: 2019-06-19

## 2019-06-19 ENCOUNTER — APPOINTMENT (OUTPATIENT)
Dept: CT IMAGING | Age: 69
End: 2019-06-19
Attending: EMERGENCY MEDICINE
Payer: MEDICARE

## 2019-06-19 VITALS
RESPIRATION RATE: 18 BRPM | BODY MASS INDEX: 56.13 KG/M2 | OXYGEN SATURATION: 96 % | SYSTOLIC BLOOD PRESSURE: 161 MMHG | DIASTOLIC BLOOD PRESSURE: 99 MMHG | HEART RATE: 76 BPM | TEMPERATURE: 97.7 F | WEIGHT: 278.44 LBS | HEIGHT: 59 IN

## 2019-06-19 DIAGNOSIS — R06.00 DYSPNEA, UNSPECIFIED TYPE: ICD-10-CM

## 2019-06-19 DIAGNOSIS — I48.91 ATRIAL FIBRILLATION, UNSPECIFIED TYPE (HCC): Primary | ICD-10-CM

## 2019-06-19 LAB
ALBUMIN SERPL-MCNC: 3.8 G/DL (ref 3.5–5)
ALBUMIN/GLOB SERPL: 1.2 {RATIO} (ref 1.1–2.2)
ALP SERPL-CCNC: 112 U/L (ref 45–117)
ALT SERPL-CCNC: 27 U/L (ref 12–78)
ANION GAP SERPL CALC-SCNC: 7 MMOL/L (ref 5–15)
AST SERPL-CCNC: 19 U/L (ref 15–37)
BASOPHILS # BLD: 0.1 K/UL (ref 0–0.1)
BASOPHILS NFR BLD: 1 % (ref 0–1)
BILIRUB SERPL-MCNC: 0.7 MG/DL (ref 0.2–1)
BNP SERPL-MCNC: 1403 PG/ML
BUN SERPL-MCNC: 22 MG/DL (ref 6–20)
BUN/CREAT SERPL: 27 (ref 12–20)
CALCIUM SERPL-MCNC: 8.6 MG/DL (ref 8.5–10.1)
CHLORIDE SERPL-SCNC: 107 MMOL/L (ref 97–108)
CK SERPL-CCNC: 98 U/L (ref 26–192)
CO2 SERPL-SCNC: 24 MMOL/L (ref 21–32)
COMMENT, HOLDF: NORMAL
CREAT SERPL-MCNC: 0.81 MG/DL (ref 0.55–1.02)
DIFFERENTIAL METHOD BLD: ABNORMAL
EOSINOPHIL # BLD: 0.4 K/UL (ref 0–0.4)
EOSINOPHIL NFR BLD: 5 % (ref 0–7)
ERYTHROCYTE [DISTWIDTH] IN BLOOD BY AUTOMATED COUNT: 14.5 % (ref 11.5–14.5)
GLOBULIN SER CALC-MCNC: 3.3 G/DL (ref 2–4)
GLUCOSE SERPL-MCNC: 102 MG/DL (ref 65–100)
HCT VFR BLD AUTO: 47.4 % (ref 35–47)
HGB BLD-MCNC: 15.5 G/DL (ref 11.5–16)
IMM GRANULOCYTES # BLD AUTO: 0 K/UL (ref 0–0.04)
IMM GRANULOCYTES NFR BLD AUTO: 0 % (ref 0–0.5)
LYMPHOCYTES # BLD: 1.8 K/UL (ref 0.8–3.5)
LYMPHOCYTES NFR BLD: 25 % (ref 12–49)
MCH RBC QN AUTO: 27.2 PG (ref 26–34)
MCHC RBC AUTO-ENTMCNC: 32.7 G/DL (ref 30–36.5)
MCV RBC AUTO: 83.3 FL (ref 80–99)
MONOCYTES # BLD: 0.6 K/UL (ref 0–1)
MONOCYTES NFR BLD: 9 % (ref 5–13)
NEUTS SEG # BLD: 4.2 K/UL (ref 1.8–8)
NEUTS SEG NFR BLD: 60 % (ref 32–75)
NRBC # BLD: 0 K/UL (ref 0–0.01)
NRBC BLD-RTO: 0 PER 100 WBC
PLATELET # BLD AUTO: 217 K/UL (ref 150–400)
PMV BLD AUTO: 11.8 FL (ref 8.9–12.9)
POTASSIUM SERPL-SCNC: 3.9 MMOL/L (ref 3.5–5.1)
PROT SERPL-MCNC: 7.1 G/DL (ref 6.4–8.2)
RBC # BLD AUTO: 5.69 M/UL (ref 3.8–5.2)
SAMPLES BEING HELD,HOLD: NORMAL
SODIUM SERPL-SCNC: 138 MMOL/L (ref 136–145)
TROPONIN I SERPL-MCNC: <0.05 NG/ML
WBC # BLD AUTO: 7 K/UL (ref 3.6–11)

## 2019-06-19 PROCEDURE — 71046 X-RAY EXAM CHEST 2 VIEWS: CPT

## 2019-06-19 PROCEDURE — 93005 ELECTROCARDIOGRAM TRACING: CPT

## 2019-06-19 PROCEDURE — 85025 COMPLETE CBC W/AUTO DIFF WBC: CPT

## 2019-06-19 PROCEDURE — 83880 ASSAY OF NATRIURETIC PEPTIDE: CPT

## 2019-06-19 PROCEDURE — 71275 CT ANGIOGRAPHY CHEST: CPT

## 2019-06-19 PROCEDURE — 99282 EMERGENCY DEPT VISIT SF MDM: CPT

## 2019-06-19 PROCEDURE — 36415 COLL VENOUS BLD VENIPUNCTURE: CPT

## 2019-06-19 PROCEDURE — 80053 COMPREHEN METABOLIC PANEL: CPT

## 2019-06-19 PROCEDURE — 84484 ASSAY OF TROPONIN QUANT: CPT

## 2019-06-19 PROCEDURE — 74011636320 HC RX REV CODE- 636/320: Performed by: EMERGENCY MEDICINE

## 2019-06-19 PROCEDURE — 82550 ASSAY OF CK (CPK): CPT

## 2019-06-19 RX ORDER — METOPROLOL SUCCINATE 25 MG/1
25 TABLET, EXTENDED RELEASE ORAL DAILY
Qty: 30 TAB | Refills: 0 | Status: SHIPPED | OUTPATIENT
Start: 2019-06-19 | End: 2019-07-19 | Stop reason: SDUPTHER

## 2019-06-19 RX ORDER — SODIUM CHLORIDE 0.9 % (FLUSH) 0.9 %
10 SYRINGE (ML) INJECTION
Status: COMPLETED | OUTPATIENT
Start: 2019-06-19 | End: 2019-06-19

## 2019-06-19 RX ADMIN — IOPAMIDOL 80 ML: 755 INJECTION, SOLUTION INTRAVENOUS at 14:14

## 2019-06-19 RX ADMIN — Medication 10 ML: at 14:15

## 2019-06-19 NOTE — DISCHARGE INSTRUCTIONS
Shortness of Breath: Care Instructions  Your Care Instructions  Shortness of breath has many causes. Sometimes conditions such as anxiety can lead to shortness of breath. Some people get mild shortness of breath when they exercise. Trouble breathing also can be a symptom of a serious problem, such as asthma, lung disease, emphysema, heart problems, and pneumonia. If your shortness of breath continues, you may need tests and treatment. Watch for any changes in your breathing and other symptoms. Follow-up care is a key part of your treatment and safety. Be sure to make and go to all appointments, and call your doctor if you are having problems. It's also a good idea to know your test results and keep a list of the medicines you take. How can you care for yourself at home? · Do not smoke or allow others to smoke around you. If you need help quitting, talk to your doctor about stop-smoking programs and medicines. These can increase your chances of quitting for good. · Get plenty of rest and sleep. · Take your medicines exactly as prescribed. Call your doctor if you think you are having a problem with your medicine. · Find healthy ways to deal with stress. ? Exercise daily. ? Get plenty of sleep. ? Eat regularly and well. When should you call for help? Call 911 anytime you think you may need emergency care. For example, call if:    · You have severe shortness of breath.     · You have symptoms of a heart attack. These may include:  ? Chest pain or pressure, or a strange feeling in the chest.  ? Sweating. ? Shortness of breath. ? Nausea or vomiting. ? Pain, pressure, or a strange feeling in the back, neck, jaw, or upper belly or in one or both shoulders or arms. ? Lightheadedness or sudden weakness. ? A fast or irregular heartbeat. After you call 911, the  may tell you to chew 1 adult-strength or 2 to 4 low-dose aspirin. Wait for an ambulance.  Do not try to drive yourself.    Call your doctor now or seek immediate medical care if:    · Your shortness of breath gets worse or you start to wheeze. Wheezing is a high-pitched sound when you breathe.     · You wake up at night out of breath or have to prop your head up on several pillows to breathe.     · You are short of breath after only light activity or while at rest.    Watch closely for changes in your health, and be sure to contact your doctor if:    · You do not get better over the next 1 to 2 days. Where can you learn more? Go to http://eusebia-chuck.info/. Enter S780 in the search box to learn more about \"Shortness of Breath: Care Instructions. \"  Current as of: September 5, 2018  Content Version: 11.9  © 2068-7219 [employer]. Care instructions adapted under license by [employer] (which disclaims liability or warranty for this information). If you have questions about a medical condition or this instruction, always ask your healthcare professional. [place] [address] any warranty or liability for your use of this information. Patient Education        Atrial Fibrillation: Care Instructions  Your Care Instructions    Atrial fibrillation is an irregular and often fast heartbeat. Treating this condition is important for several reasons. It can cause blood clots, which can travel from your heart to your brain and cause a stroke. If you have a fast heartbeat, you may feel lightheaded, dizzy, and weak. An irregular heartbeat can also increase your risk for heart failure. Atrial fibrillation is often the result of another heart condition, such as high blood pressure or coronary artery disease. Making changes to improve your heart condition will help you stay healthy and active. Follow-up care is a key part of your treatment and safety. Be sure to make and go to all appointments, and call your doctor if you are having problems.  It's also a good idea to know your test results and keep a list of the medicines you take. How can you care for yourself at home? Medicines    · Take your medicines exactly as prescribed. Call your doctor if you think you are having a problem with your medicine. You will get more details on the specific medicines your doctor prescribes.     · If your doctor has given you a blood thinner to prevent a stroke, be sure you get instructions about how to take your medicine safely. Blood thinners can cause serious bleeding problems.     · Do not take any vitamins, over-the-counter drugs, or herbal products without talking to your doctor first.    Lifestyle changes    · Do not smoke. Smoking can increase your chance of a stroke and heart attack. If you need help quitting, talk to your doctor about stop-smoking programs and medicines. These can increase your chances of quitting for good.     · Eat a heart-healthy diet.     · Stay at a healthy weight. Lose weight if you need to.     · Limit alcohol to 2 drinks a day for men and 1 drink a day for women. Too much alcohol can cause health problems.     · Avoid colds and flu. Get a pneumococcal vaccine shot. If you have had one before, ask your doctor whether you need another dose. Get a flu shot every year. If you must be around people with colds or flu, wash your hands often. Activity    · If your doctor recommends it, get more exercise. Walking is a good choice. Bit by bit, increase the amount you walk every day. Try for at least 30 minutes on most days of the week. You also may want to swim, bike, or do other activities. Your doctor may suggest that you join a cardiac rehabilitation program so that you can have help increasing your physical activity safely.     · Start light exercise if your doctor says it is okay. Even a small amount will help you get stronger, have more energy, and manage stress. Walking is an easy way to get exercise.  Start out by walking a little more than you did in the hospital. Gradually increase the amount you walk.     · When you exercise, watch for signs that your heart is working too hard. You are pushing too hard if you cannot talk while you are exercising. If you become short of breath or dizzy or have chest pain, sit down and rest immediately.     · Check your pulse regularly. Place two fingers on the artery at the palm side of your wrist, in line with your thumb. If your heartbeat seems uneven or fast, talk to your doctor. When should you call for help? Call 911 anytime you think you may need emergency care. For example, call if:    · You have symptoms of a heart attack. These may include:  ? Chest pain or pressure, or a strange feeling in the chest.  ? Sweating. ? Shortness of breath. ? Nausea or vomiting. ? Pain, pressure, or a strange feeling in the back, neck, jaw, or upper belly or in one or both shoulders or arms. ? Lightheadedness or sudden weakness. ? A fast or irregular heartbeat. After you call 911, the  may tell you to chew 1 adult-strength or 2 to 4 low-dose aspirin. Wait for an ambulance. Do not try to drive yourself.     · You have symptoms of a stroke. These may include:  ? Sudden numbness, tingling, weakness, or loss of movement in your face, arm, or leg, especially on only one side of your body. ? Sudden vision changes. ? Sudden trouble speaking. ? Sudden confusion or trouble understanding simple statements. ? Sudden problems with walking or balance. ? A sudden, severe headache that is different from past headaches.     · You passed out (lost consciousness).    Call your doctor now or seek immediate medical care if:    · You have new or increased shortness of breath.     · You feel dizzy or lightheaded, or you feel like you may faint.     · Your heart rate becomes irregular.     · You can feel your heart flutter in your chest or skip heartbeats.  Tell your doctor if these symptoms are new or worse.    Watch closely for changes in your health, and be sure to contact your doctor if you have any problems. Where can you learn more? Go to http://eusebia-chuck.info/. Enter U020 in the search box to learn more about \"Atrial Fibrillation: Care Instructions. \"  Current as of: July 22, 2018  Content Version: 11.9  © 9924-3111 [employer]. Care instructions adapted under license by [employer] (which disclaims liability or warranty for this information). If you have questions about a medical condition or this instruction, always ask your healthcare professional. [address] any warranty or liability for your use of this information.

## 2019-06-19 NOTE — LETTER
ADULT WORK/SCHOOL NOTE Tomi Max [address] [id number] 
 
 
06/19/19 To whom it may concern, Please be advised that Tomi Max was evaluated and discharged from the Emergency Department on 06/19/19. Tomi Max is excused from work/school now through 6/21/19 Tomi Max may return to work with the following restrictions: 
 
 none Should Tomi Max require more time off or further clearance, she should follow up with her own regular physician or specialist. 
 
 
Sincerely, 
 
 
 
 
Isaura Antunez RN

## 2019-06-19 NOTE — ED PROVIDER NOTES
EMERGENCY DEPARTMENT HISTORY AND PHYSICAL EXAM      Date: 6/19/2019  Patient Name: Nickey Rinne    Please note that this dictation was completed with [employer], the computer voice recognition software. Quite often unanticipated grammatical, syntax, homophones, and other interpretive errors are inadvertently transcribed by the computer software. Please disregard these errors. Please excuse any errors that have escaped final proofreading. History of Presenting Illness     Chief Complaint   Patient presents with    Shortness of Breath     Ambulatory into the ED with c/o SOB and dizziness x this am.       History Provided By: Patient    HPI: Nickey Rinne, 76 y.o. female, with a past medical history significant for obesity, hypertension presenting the emergency department complaining of shortness of breath is been going on for approximately 1 week. Shortness of breath is worse with exertion. It is associated with dizziness which she describes as feeling unsteady on her feet. She does admit to some intermittent sharp chest pain as well. Chest pain does not have any radiation. No other exacerbating or relieving factors. Shortness of breath is relieved by rest.  Denies any fever, chills, cough, nausea vomiting diarrhea. Admits to bilateral lower extremity edema, no unilateral edema. PCP: Starr Do MD    No current facility-administered medications on file prior to encounter.       Current Outpatient Medications on File Prior to Encounter   Medication Sig Dispense Refill    cyclobenzaprine (FLEXERIL) 5 mg tablet TAKE 1 TABLET BY MOUTH THREE TIMES DAILY AS NEEDED FOR MUSCLE SPASM 30 Tab 1    buPROPion XL (WELLBUTRIN XL) 150 mg tablet TAKE 1 TABLET BY MOUTH ONCE DAILY IN THE MORNING 30 Tab 5    VITAMIN D2 50,000 unit capsule TAKE 1 TABLET BY MOUTH ONCE EVERY 7 DAYS 12 Cap 0    oxybutynin chloride XL (DITROPAN XL) 10 mg CR tablet TAKE 1 TABLET BY MOUTH ONCE DAILY 30 Tab 3    benazepril (LOTENSIN) 10 mg tablet TAKE 1 TABLET BY MOUTH ONCE DAILY 90 Tab 1    ALPRAZolam (XANAX) 0.25 mg tablet TAKE 1 TABLET BY MOUTH ONCE DAILY AS NEEDED FOR ANXIETY 30 Tab 1    benazepril (LOTENSIN) 20 mg tablet TAKE 1 TABLET BY MOUTH ONCE DAILY 90 Tab 1    raNITIdine (ZANTAC) 150 mg tablet Take 1 Tab by mouth two (2) times a day. 60 Tab 1    zolpidem (AMBIEN) 5 mg tablet Take 1 Tab by mouth nightly as needed for Sleep. Max Daily Amount: 5 mg. 10 Tab 0    furosemide (LASIX) 20 mg tablet Take 1 Tab by mouth daily. 30 Tab 5       Past History     Past Medical History:  Past Medical History:   Diagnosis Date    Arthritis     Hypertension     Osteopenia        Past Surgical History:  Past Surgical History:   Procedure Laterality Date    HX CHOLECYSTECTOMY      HX HEENT      HX ORTHOPAEDIC      Bilateral Knee Replacement       Family History:  Family History   Problem Relation Age of Onset    Cancer Mother         pancreatic cancer    Cancer Brother        Social History:  Social History     Tobacco Use    Smoking status: Never Smoker    Smokeless tobacco: Never Used   Substance Use Topics    Alcohol use: No     Alcohol/week: 0.0 oz    Drug use: No       Allergies: Allergies   Allergen Reactions    Latex Rash    Adhesive Rash    Raspberry Rash    Tetracycline Hives         Review of Systems   Review of Systems   Constitutional: Negative for chills and fever. HENT: Negative for congestion and sore throat. Eyes: Negative for visual disturbance. Respiratory: Positive for shortness of breath. Negative for cough. Cardiovascular: Negative for chest pain and leg swelling. Gastrointestinal: Negative for abdominal pain, blood in stool, diarrhea and nausea. Endocrine: Negative for polyuria. Genitourinary: Negative for dysuria, flank pain, vaginal bleeding and vaginal discharge. Musculoskeletal: Negative for myalgias. Skin: Negative for rash.    Allergic/Immunologic: Negative for immunocompromised state. Neurological: Negative for weakness and headaches. Psychiatric/Behavioral: Negative for confusion. Physical Exam   Physical Exam   Constitutional: She is oriented to person, place, and time and well-developed, well-nourished, and in no distress. No distress. obese   HENT:   Head: Normocephalic and atraumatic. Eyes: Pupils are equal, round, and reactive to light. Neck: Normal range of motion. Neck supple. No JVD present. Cardiovascular:   No murmur heard. Irregularly irregular rhythm., normal rate. Pulmonary/Chest: Effort normal and breath sounds normal. No respiratory distress. She has no wheezes. Abdominal: Soft. Bowel sounds are normal. She exhibits no distension. Musculoskeletal: Normal range of motion. She exhibits no edema or deformity. Neurological: She is alert and oriented to person, place, and time. Skin: Skin is warm and dry.    Psychiatric: Affect normal.         Diagnostic Study Results     Labs -     Recent Results (from the past 12 hour(s))   EKG, 12 LEAD, INITIAL    Collection Time: 06/19/19 11:47 AM   Result Value Ref Range    Ventricular Rate 96 BPM    Atrial Rate 94 BPM    QRS Duration 80 ms    Q-T Interval 372 ms    QTC Calculation (Bezet) 469 ms    Calculated R Axis -23 degrees    Calculated T Axis -2 degrees    Diagnosis       Atrial fibrillation with premature ventricular or aberrantly conducted   complexes  Low voltage QRS  Inferior infarct , age undetermined  Cannot rule out Anterior infarct , age undetermined  When compared with ECG of 30-JAN-2015 10:19,  Atrial fibrillation has replaced Sinus rhythm  Nonspecific T wave abnormality now evident in Lateral leads  QT has lengthened     CBC WITH AUTOMATED DIFF    Collection Time: 06/19/19 12:10 PM   Result Value Ref Range    WBC 7.0 3.6 - 11.0 K/uL    RBC 5.69 (H) 3.80 - 5.20 M/uL    HGB 15.5 11.5 - 16.0 g/dL    HCT 47.4 (H) 35.0 - 47.0 %    MCV 83.3 80.0 - 99.0 FL    MCH 27.2 26.0 - 34.0 PG MCHC 32.7 30.0 - 36.5 g/dL    RDW 14.5 11.5 - 14.5 %    PLATELET 788 696 - 332 K/uL    MPV 11.8 8.9 - 12.9 FL    NRBC 0.0 0  WBC    ABSOLUTE NRBC 0.00 0.00 - 0.01 K/uL    NEUTROPHILS 60 32 - 75 %    LYMPHOCYTES 25 12 - 49 %    MONOCYTES 9 5 - 13 %    EOSINOPHILS 5 0 - 7 %    BASOPHILS 1 0 - 1 %    IMMATURE GRANULOCYTES 0 0.0 - 0.5 %    ABS. NEUTROPHILS 4.2 1.8 - 8.0 K/UL    ABS. LYMPHOCYTES 1.8 0.8 - 3.5 K/UL    ABS. MONOCYTES 0.6 0.0 - 1.0 K/UL    ABS. EOSINOPHILS 0.4 0.0 - 0.4 K/UL    ABS. BASOPHILS 0.1 0.0 - 0.1 K/UL    ABS. IMM. GRANS. 0.0 0.00 - 0.04 K/UL    DF AUTOMATED     METABOLIC PANEL, COMPREHENSIVE    Collection Time: 06/19/19 12:10 PM   Result Value Ref Range    Sodium 138 136 - 145 mmol/L    Potassium 3.9 3.5 - 5.1 mmol/L    Chloride 107 97 - 108 mmol/L    CO2 24 21 - 32 mmol/L    Anion gap 7 5 - 15 mmol/L    Glucose 102 (H) 65 - 100 mg/dL    BUN 22 (H) 6 - 20 MG/DL    Creatinine 0.81 0.55 - 1.02 MG/DL    BUN/Creatinine ratio 27 (H) 12 - 20      GFR est AA >60 >60 ml/min/1.73m2    GFR est non-AA >60 >60 ml/min/1.73m2    Calcium 8.6 8.5 - 10.1 MG/DL    Bilirubin, total 0.7 0.2 - 1.0 MG/DL    ALT (SGPT) 27 12 - 78 U/L    AST (SGOT) 19 15 - 37 U/L    Alk. phosphatase 112 45 - 117 U/L    Protein, total 7.1 6.4 - 8.2 g/dL    Albumin 3.8 3.5 - 5.0 g/dL    Globulin 3.3 2.0 - 4.0 g/dL    A-G Ratio 1.2 1.1 - 2.2     TROPONIN I    Collection Time: 06/19/19 12:10 PM   Result Value Ref Range    Troponin-I, Qt. <0.05 <0.05 ng/mL   CK W/ REFLX CKMB    Collection Time: 06/19/19 12:10 PM   Result Value Ref Range    CK 98 26 - 192 U/L   SAMPLES BEING HELD    Collection Time: 06/19/19 12:10 PM   Result Value Ref Range    SAMPLES BEING HELD BLUE     COMMENT        Add-on orders for these samples will be processed based on acceptable specimen integrity and analyte stability, which may vary by analyte.    NT-PRO BNP    Collection Time: 06/19/19 12:10 PM   Result Value Ref Range    NT pro-BNP 1,403 (H) <125 PG/ML Radiologic Studies -   CTA CHEST W OR W WO CONT   Final Result   IMPRESSION:     1. No evidence of acute pulmonary thromboembolism. 2. Minimal right basilar dependent atelectasis. Lungs otherwise clear. XR CHEST PA LAT   Final Result   Impression: No acute process or change compared to the prior exam.            CT Results  (Last 48 hours)               06/19/19 1415  CTA CHEST W OR W WO CONT Final result    Impression:  IMPRESSION:     1. No evidence of acute pulmonary thromboembolism. 2. Minimal right basilar dependent atelectasis. Lungs otherwise clear. Narrative:  EXAMINATION:  CTA CHEST W OR W WO CONT   CLINICAL INFORMATION:  Chest pain, acute, pulmonary embolism (PE) suspected   COMPARISON: 1/30/2015       TECHNIQUE: Precontrast  images were obtained to localize the volume for   acquisition. Multislice helical CT arteriography was performed from the   diaphragm to the thoracic inlet during uneventful rapid bolus intravenous   administration of 80 mL of Isovue  370. Lung and soft tissue windows were   generated. Post processing was performed to include 3D MIP  and coronal and   sagittal reformatted images. CT dose reduction was achieved through the use of a   standardized protocol tailored for this examination and automatic exposure   control for dose modulation. Findings   DIAGNOSTIC QUALITY: Adequate. PULMONARY EMBOLISM: None. PULMONARY ARTERIES:  Normal in caliber. LUNG PARENCHYMA: Mild dependent atelectasis in the right lower lobe. No   significant airspace disease. No nodules or masses. PLEURAL EFFUSION: None. CENTRAL AIRWAYS:  Patent. ADENOPATHY:  None. HEART : Mild cardiomegaly. Unchanged pericardial cyst.   GREAT VESSELS:  No significant abnormalities. UPPER ABDOMEN:  No significant abnormalities. BONES:  No significant abnormalities.                 CXR Results  (Last 48 hours)               06/19/19 1222  XR CHEST PA LAT Final result Impression:  Impression: No acute process or change compared to the prior exam.           Narrative:  Exam:  2 view chest       Indication: Shortness of breath       Comparison to 11/18/2017. PA and lateral views demonstrate normal heart size. There is no acute process in   the lung fields. Degenerative changes are seen in the thoracic spine. Medical Decision Making   I am the first provider for this patient. I reviewed the vital signs, available nursing notes, past medical history, past surgical history, family history and social history. Vital Signs-Reviewed the patient's vital signs. Patient Vitals for the past 12 hrs:   Temp Pulse Resp BP SpO2   06/19/19 1535  76 18 (!) 161/99 96 %   06/19/19 1144 97.7 °F (36.5 °C) 100 15 131/85 97 %       Pulse Oximetry Analysis - 96% on RA    Cardiac Monitor:   Afib, rate controlled 80-96    EKG interpretation: (Preliminary)  Atrial fibrillation, rate 96. No acute ischemic changes. Records Reviewed: Nursing Notes and Old Medical Records    Provider Notes (Medical Decision Making):   Patient here with dypspnea on exertion. Lungs CTAB. Found to be in afib on EKG, but rate controlled. Will obtain CTA to rule out PE. Does not appear to be consistent with pna or chf. If cta negative patient will need to follow up with PCP. May need sleep study, may need pulmonology consult. I suspect patient likely has undiagnosed sleep apnea leading to pulmonary htn, and new onset afib. ED Course:   Initial assessment performed. The patients presenting problems have been discussed, and they are in agreement with the care plan formulated and outlined with them. I have encouraged them to ask questions as they arise throughout their visit. Critical Care Time:   none    Disposition:  Will discharge to home now in stable condition. Patient will return with worsening pain. Close return precautions given, follow up advised as written.  Patient voiced understanding and questions were answered at bedside. PLAN:  1. Discharge Medication List as of 6/19/2019  3:28 PM      START taking these medications    Details   metoprolol succinate (TOPROL-XL) 25 mg XL tablet Take 1 Tab by mouth daily. , Normal, Disp-30 Tab, R-0      apixaban (ELIQUIS) 5 mg tablet Take 1 Tab by mouth two (2) times a day., Normal, Disp-60 Tab, R-0         CONTINUE these medications which have NOT CHANGED    Details   cyclobenzaprine (FLEXERIL) 5 mg tablet TAKE 1 TABLET BY MOUTH THREE TIMES DAILY AS NEEDED FOR MUSCLE SPASM, NormalPlease consider 90 day supplies to promote better adherenceDisp-30 Tab, R-1      buPROPion XL (WELLBUTRIN XL) 150 mg tablet TAKE 1 TABLET BY MOUTH ONCE DAILY IN THE MORNING, NormalPlease consider 90 day supplies to promote better adherenceDisp-30 Tab, R-5      VITAMIN D2 50,000 unit capsule TAKE 1 TABLET BY MOUTH ONCE EVERY 7 DAYS, NormalPlease consider 90 day supplies to promote better adherenceDisp-12 Cap, R-0      oxybutynin chloride XL (DITROPAN XL) 10 mg CR tablet TAKE 1 TABLET BY MOUTH ONCE DAILY, NormalPlease consider 90 day supplies to promote better adherenceDisp-30 Tab, R-3      !! benazepril (LOTENSIN) 10 mg tablet TAKE 1 TABLET BY MOUTH ONCE DAILY, Normal, Disp-90 Tab, R-1      ALPRAZolam (XANAX) 0.25 mg tablet TAKE 1 TABLET BY MOUTH ONCE DAILY AS NEEDED FOR ANXIETY, Print, Disp-30 Tab, R-1      !! benazepril (LOTENSIN) 20 mg tablet TAKE 1 TABLET BY MOUTH ONCE DAILY, Normal, Disp-90 Tab, R-1      raNITIdine (ZANTAC) 150 mg tablet Take 1 Tab by mouth two (2) times a day., Normal, Disp-60 Tab, R-1      zolpidem (AMBIEN) 5 mg tablet Take 1 Tab by mouth nightly as needed for Sleep. Max Daily Amount: 5 mg., Phone In, Disp-10 Tab, R-0      furosemide (LASIX) 20 mg tablet Take 1 Tab by mouth daily. , Normal, Disp-30 Tab, R-5       !! - Potential duplicate medications found. Please discuss with provider.         2.   Follow-up Information     Follow up With Specialties Details Why [facility]   [phone] [address]  [place]    [facility] EMERGENCY DEPT Emergency Medicine  If symptoms worsen [address] [facility] [address]  [phone]    Shabana Sexton MD Internal Medicine  As needed [address] [facility] [address] [place] [address]  [phone]            Return to ED if worse     Diagnosis     Clinical Impression:   1. Atrial fibrillation, unspecified type ([address]    2. Dyspnea, unspecified type        Attestations:   This note was completed by Ivna Peralta DO

## 2019-06-20 LAB
ATRIAL RATE: 94 BPM
CALCULATED R AXIS, ECG10: -23 DEGREES
CALCULATED T AXIS, ECG11: -2 DEGREES
DIAGNOSIS, 93000: NORMAL
Q-T INTERVAL, ECG07: 372 MS
QRS DURATION, ECG06: 80 MS
QTC CALCULATION (BEZET), ECG08: 469 MS
VENTRICULAR RATE, ECG03: 96 BPM

## 2019-07-05 ENCOUNTER — HOSPITAL ENCOUNTER (OUTPATIENT)
Dept: LAB | Age: 69
Discharge: HOME | End: 2019-07-05
Payer: MEDICARE

## 2019-07-05 PROCEDURE — 36415 COLL VENOUS BLD VENIPUNCTURE: CPT

## 2019-07-05 PROCEDURE — 80061 LIPID PANEL: CPT

## 2019-07-05 PROCEDURE — 85025 COMPLETE CBC W/AUTO DIFF WBC: CPT

## 2019-07-05 PROCEDURE — 80053 COMPREHEN METABOLIC PANEL: CPT

## 2019-07-06 LAB
ALBUMIN SERPL-MCNC: 3.9 G/DL (ref 3.6–4.8)
ALBUMIN/GLOB SERPL: 1.6 {RATIO} (ref 1.2–2.2)
ALP SERPL-CCNC: 91 IU/L (ref 39–117)
ALT SERPL-CCNC: 17 IU/L (ref 0–32)
AST SERPL-CCNC: 16 IU/L (ref 0–40)
BASOPHILS # BLD AUTO: 0 X10E3/UL (ref 0–0.2)
BASOPHILS NFR BLD AUTO: 1 %
BILIRUB SERPL-MCNC: 0.7 MG/DL (ref 0–1.2)
BUN SERPL-MCNC: 18 MG/DL (ref 8–27)
BUN/CREAT SERPL: 24 (ref 12–28)
CALCIUM SERPL-MCNC: 8.9 MG/DL (ref 8.7–10.3)
CHLORIDE SERPL-SCNC: 106 MMOL/L (ref 96–106)
CHOLEST SERPL-MCNC: 186 MG/DL (ref 100–199)
CO2 SERPL-SCNC: 21 MMOL/L (ref 20–29)
CREAT SERPL-MCNC: 0.76 MG/DL (ref 0.57–1)
EOSINOPHIL # BLD AUTO: 0.5 X10E3/UL (ref 0–0.4)
EOSINOPHIL NFR BLD AUTO: 7 %
ERYTHROCYTE [DISTWIDTH] IN BLOOD BY AUTOMATED COUNT: 13.5 % (ref 12.3–15.4)
GLOBULIN SER CALC-MCNC: 2.4 G/DL (ref 1.5–4.5)
GLUCOSE SERPL-MCNC: 100 MG/DL (ref 65–99)
HCT VFR BLD AUTO: 43.7 % (ref 34–46.6)
HDLC SERPL-MCNC: 56 MG/DL
HGB BLD-MCNC: 14.7 G/DL (ref 11.1–15.9)
IMM GRANULOCYTES # BLD AUTO: 0 X10E3/UL (ref 0–0.1)
IMM GRANULOCYTES NFR BLD AUTO: 0 %
LDLC SERPL CALC-MCNC: 113 MG/DL (ref 0–99)
LYMPHOCYTES # BLD AUTO: 1.4 X10E3/UL (ref 0.7–3.1)
LYMPHOCYTES NFR BLD AUTO: 21 %
MCH RBC QN AUTO: 27.4 PG (ref 26.6–33)
MCHC RBC AUTO-ENTMCNC: 33.6 G/DL (ref 31.5–35.7)
MCV RBC AUTO: 82 FL (ref 79–97)
MONOCYTES # BLD AUTO: 0.6 X10E3/UL (ref 0.1–0.9)
MONOCYTES NFR BLD AUTO: 9 %
NEUTROPHILS # BLD AUTO: 4 X10E3/UL (ref 1.4–7)
NEUTROPHILS NFR BLD AUTO: 62 %
PLATELET # BLD AUTO: 206 X10E3/UL (ref 150–450)
POTASSIUM SERPL-SCNC: 4.6 MMOL/L (ref 3.5–5.2)
PROT SERPL-MCNC: 6.3 G/DL (ref 6–8.5)
RBC # BLD AUTO: 5.36 X10E6/UL (ref 3.77–5.28)
SODIUM SERPL-SCNC: 139 MMOL/L (ref 134–144)
TRIGL SERPL-MCNC: 85 MG/DL (ref 0–149)
VLDLC SERPL CALC-MCNC: 17 MG/DL (ref 5–40)
WBC # BLD AUTO: 6.4 X10E3/UL (ref 3.4–10.8)

## 2019-07-11 ENCOUNTER — HOSPITAL ENCOUNTER (OUTPATIENT)
Dept: NON INVASIVE DIAGNOSTICS | Age: 69
Discharge: HOME | End: 2019-07-11
Payer: MEDICARE

## 2019-07-11 VITALS
DIASTOLIC BLOOD PRESSURE: 99 MMHG | RESPIRATION RATE: 20 BRPM | HEART RATE: 82 BPM | SYSTOLIC BLOOD PRESSURE: 159 MMHG | OXYGEN SATURATION: 95 %

## 2019-07-11 DIAGNOSIS — I48.0 PAROXYSMAL ATRIAL FIBRILLATION (HCC): ICD-10-CM

## 2019-07-11 PROCEDURE — 93325 DOPPLER ECHO COLOR FLOW MAPG: CPT

## 2019-07-11 PROCEDURE — 96375 TX/PRO/DX INJ NEW DRUG ADDON: CPT

## 2019-07-11 PROCEDURE — 74011000250 HC RX REV CODE- 250: Performed by: INTERNAL MEDICINE

## 2019-07-11 PROCEDURE — 96374 THER/PROPH/DIAG INJ IV PUSH: CPT

## 2019-07-11 PROCEDURE — 74011250636 HC RX REV CODE- 250/636

## 2019-07-11 PROCEDURE — 93312 ECHO TRANSESOPHAGEAL: CPT

## 2019-07-11 RX ORDER — FENTANYL CITRATE 50 UG/ML
12.5-5 INJECTION, SOLUTION INTRAMUSCULAR; INTRAVENOUS
Status: DISCONTINUED | OUTPATIENT
Start: 2019-07-11 | End: 2019-07-11

## 2019-07-11 RX ORDER — LIDOCAINE HYDROCHLORIDE 20 MG/ML
15 SOLUTION OROPHARYNGEAL AS NEEDED
Status: DISCONTINUED | OUTPATIENT
Start: 2019-07-11 | End: 2019-07-11

## 2019-07-11 RX ORDER — MIDAZOLAM HYDROCHLORIDE 1 MG/ML
.5-2 INJECTION, SOLUTION INTRAMUSCULAR; INTRAVENOUS
Status: DISCONTINUED | OUTPATIENT
Start: 2019-07-11 | End: 2019-07-11

## 2019-07-11 RX ORDER — OMEPRAZOLE 20 MG/1
20 CAPSULE, DELAYED RELEASE ORAL DAILY
COMMUNITY
End: 2019-08-27 | Stop reason: SDUPTHER

## 2019-07-11 RX ADMIN — MIDAZOLAM HYDROCHLORIDE 1 MG: 1 INJECTION, SOLUTION INTRAMUSCULAR; INTRAVENOUS at 13:14

## 2019-07-11 RX ADMIN — BENZOCAINE, BUTAMBEN, AND TETRACAINE HYDROCHLORIDE 1 SPRAY: .028; .004; .004 AEROSOL, SPRAY TOPICAL at 13:08

## 2019-07-11 RX ADMIN — FENTANYL CITRATE 25 MCG: 50 INJECTION, SOLUTION INTRAMUSCULAR; INTRAVENOUS at 13:17

## 2019-07-11 RX ADMIN — FENTANYL CITRATE 25 MCG: 50 INJECTION, SOLUTION INTRAMUSCULAR; INTRAVENOUS at 13:12

## 2019-07-11 RX ADMIN — MIDAZOLAM HYDROCHLORIDE 1 MG: 1 INJECTION, SOLUTION INTRAMUSCULAR; INTRAVENOUS at 13:17

## 2019-07-11 RX ADMIN — MIDAZOLAM HYDROCHLORIDE 1 MG: 1 INJECTION, SOLUTION INTRAMUSCULAR; INTRAVENOUS at 13:11

## 2019-07-11 RX ADMIN — MIDAZOLAM HYDROCHLORIDE 1 MG: 1 INJECTION, SOLUTION INTRAMUSCULAR; INTRAVENOUS at 13:13

## 2019-07-11 RX ADMIN — LIDOCAINE HYDROCHLORIDE 15 ML: 20 SOLUTION ORAL; TOPICAL at 13:07

## 2019-07-11 NOTE — DISCHARGE INSTRUCTIONS
AFTER YOUR ATTEMPTED TRANSESOPHAGEAL ECHOCARDIOGRAM    Be sure someone else drives you home; do not drive today. You may feel drowsy for several hours. Do not eat or drink for at least two hours after your procedure. Your throat will be numb and there is a risk you might have difficulty swallowing for a while. Be careful when you do eat or drink for the first time especially with hot fluids since you could easily burn your throat. Call your doctor if:    · You are bleeding from your throat or mouth. · You have trouble breathing all of a sudden. · You have chest pain or any pain that spreads to your neck, jaw, or arms. · You have questions or concerns. · You have a fever greater than 101°F.    Doctor: [place]    Special Instructions:    No driving for 24 hours. Discharge Medications:   {Medication reconciliation information is now added to the patient's AVS automatically when it is printed. There is no need to use this SmartLink in discharge instructions.   Highlight this text and delete it to clear this message}

## 2019-07-11 NOTE — PROGRESS NOTES
Patient arrived to Non-Invasive Cardiology Lab for Out Patient JOHN/Cardioversion Procedure. Staff introduced to patient. Patient identifiers verified with Name and Date of Birth. Procedure verified with patient. Consent forms reviewed and signed by patient or authorized representative and verified. Allergies verified. Patient informed of procedure and plan of care. Questions answered with review. Patient on cardiac monitor, non-invasive blood pressure, SPO2 monitor. On 2l NC. Patient is A&Ox3. Patient reports no complaints. Patient on stretcher, in low position, with side rails up. Patient instructed to call for assistance as needed. Family in waiting room.

## 2019-07-11 NOTE — PROGRESS NOTES
Pt for JOHN/Cardioversion. Pt reports anxiety with up coming procedure. Pt tearful at times. Education and empathetic support given. Pt states she feels better. Will continue to monitor.

## 2019-07-11 NOTE — PROGRESS NOTES
Brief Procedure Note    Patient: Lula Fabry MRN: [id number]  SSN: [id number]    YOB: 1950  Age: 71 y.o. Sex: female      Date of Procedure: 7/11/2019     Pre-procedure Diagnosis: AFib    Post-procedure Diagnosis: AFib    Procedure: Attempted JOHN; pt would not allow me to place the probe. Performed By: Margy Lee III, DO     Anesthesia: Moderate Sedation    Estimated Blood Loss: Less than 10 mL      Specimens:  None    Findings: As above    Complications: None    Implants: None    Recommendations: Continue medical therapy.     Signed By: Francesco Hughes DO     July 11, 2019

## 2019-07-11 NOTE — PROGRESS NOTES
Discharge instructions reviewed with patient and daughters. Allowed adequate time to ask questions, all questions answered. Printed copy of AVS given to patient. All belongings gathered, IV and tele discontinued. Transported via wheelchair to main entrance and into care of family.

## 2019-07-11 NOTE — PROGRESS NOTES
Pt sedated with 4mg Versed and 50mcg Fentanyl for JOHN (monitored sedation from 1310 to 1323). Procedure not completed, patient unable to allow probe to be inserted. MD cancelled procedure.

## 2019-07-11 NOTE — PROGRESS NOTES
DISCHARGE SUMMARY       The following personal items collected during your admission are returned to you:   Dental Appliance:  Patient left at home  Vision:  With patient    Jewelry:  With patient  Clothing:  Shirt        PATIENT INSTRUCTIONS: Continue taking all the same medications as directed no changes. Your throat was numbed for the procedure, so start with sips of water and advance diet as swallowing returns to normal. Avoid any scalding hot beverages for the next 2 hours. Call to make an appointment with Dr. Henderosn / Nurse Alexandre Phan in 2 week(s). What to do at Home:  Recommended activity: No driving today      The discharge information has been reviewed with the PATIENT/daughters . The PATIENT  verbalized understanding.

## 2019-07-13 DIAGNOSIS — N32.81 OAB (OVERACTIVE BLADDER): ICD-10-CM

## 2019-07-15 RX ORDER — OXYBUTYNIN CHLORIDE 10 MG/1
TABLET, EXTENDED RELEASE ORAL
Qty: 30 TAB | Refills: 3 | Status: SHIPPED | OUTPATIENT
Start: 2019-07-15

## 2019-07-19 ENCOUNTER — OFFICE VISIT (OUTPATIENT)
Dept: INTERNAL MEDICINE CLINIC | Age: 69
End: 2019-07-19

## 2019-07-19 VITALS
DIASTOLIC BLOOD PRESSURE: 75 MMHG | TEMPERATURE: 98.3 F | SYSTOLIC BLOOD PRESSURE: 140 MMHG | RESPIRATION RATE: 18 BRPM | BODY MASS INDEX: 55.84 KG/M2 | WEIGHT: 277 LBS | OXYGEN SATURATION: 95 % | HEART RATE: 61 BPM | HEIGHT: 59 IN

## 2019-07-19 DIAGNOSIS — I48.0 PAROXYSMAL ATRIAL FIBRILLATION (HCC): Primary | ICD-10-CM

## 2019-07-19 DIAGNOSIS — Z00.00 MEDICARE ANNUAL WELLNESS VISIT, SUBSEQUENT: ICD-10-CM

## 2019-07-19 RX ORDER — METOPROLOL SUCCINATE 25 MG/1
25 TABLET, EXTENDED RELEASE ORAL DAILY
Qty: 30 TAB | Refills: 5 | OUTPATIENT
Start: 2019-07-19 | End: 2020-11-22

## 2019-07-19 NOTE — PROGRESS NOTES
CC: Hypertension and ED Follow-up (06/19/2019 Afib)  anxiety    HPI:    Patient is 70 yo woman presenting with new afib. She was seen in ER in June with new afib. She was then seen by Dr Elpidio Pollack and had planned cardioversion however patient did not tolerated JOHN and no cardioversion done. Has been on eliquis. Dizziness resolved  Patient was started on metoprolol 25 mg long acting      Patient's  passed away  April 2018    HTN : previously  we increased benazepril to 30mg daily . BLood pressure is 140/80 Patient is not exercising  Does report eating a healthy diet    Anxiety much better since quitting job. wellbutrin 150mg     Working part time in [employer]    Denies snoring, feeling sleepy during the day    ROS:  10 systems reviewed and negative other than HPI    Past Medical History:   Diagnosis Date    A-fib ([facility] [address]     Arthritis     Hypertension     Osteopenia        Current Outpatient Medications on File Prior to Visit   Medication Sig Dispense Refill    oxybutynin chloride XL (DITROPAN XL) 10 mg CR tablet TAKE 1 TABLET BY MOUTH ONCE DAILY 30 Tab 3    omeprazole (PRILOSEC) 20 mg capsule Take 20 mg by mouth daily.  B.infantis-B.ani-B.long-B.bifi (PROBIOTIC 4X) 10-15 mg TbEC Take  by mouth daily. Indications: Pt takes 1 probiotic tablet daily      metoprolol succinate (TOPROL-XL) 25 mg XL tablet Take 1 Tab by mouth daily. 30 Tab 0    apixaban (ELIQUIS) 5 mg tablet Take 1 Tab by mouth two (2) times a day.  60 Tab 0    cyclobenzaprine (FLEXERIL) 5 mg tablet TAKE 1 TABLET BY MOUTH THREE TIMES DAILY AS NEEDED FOR MUSCLE SPASM 30 Tab 1    buPROPion XL (WELLBUTRIN XL) 150 mg tablet TAKE 1 TABLET BY MOUTH ONCE DAILY IN THE MORNING 30 Tab 5    VITAMIN D2 50,000 unit capsule TAKE 1 TABLET BY MOUTH ONCE EVERY 7 DAYS 12 Cap 0    benazepril (LOTENSIN) 10 mg tablet TAKE 1 TABLET BY MOUTH ONCE DAILY 90 Tab 1    ALPRAZolam (XANAX) 0.25 mg tablet TAKE 1 TABLET BY MOUTH ONCE DAILY AS NEEDED FOR ANXIETY 30 Tab 1    benazepril (LOTENSIN) 20 mg tablet TAKE 1 TABLET BY MOUTH ONCE DAILY 90 Tab 1    furosemide (LASIX) 20 mg tablet Take 1 Tab by mouth daily. 30 Tab 5    raNITIdine (ZANTAC) 150 mg tablet Take 1 Tab by mouth two (2) times a day. 60 Tab 1     No current facility-administered medications on file prior to visit.         Past Surgical History:   Procedure Laterality Date    HX CHOLECYSTECTOMY      HX HEENT      HX ORTHOPAEDIC      Bilateral Knee Replacement       Family History   Problem Relation Age of Onset    Cancer Mother         pancreatic cancer    Cancer Brother      Reviewed and no changes     Social History     Socioeconomic History    Marital status:      Spouse name: Not on file    Number of children: Not on file    Years of education: Not on file    Highest education level: Not on file   Occupational History    Not on file   Social Needs    Financial resource strain: Not on file    Food insecurity:     Worry: Not on file     Inability: Not on file    Transportation needs:     Medical: Not on file     Non-medical: Not on file   Tobacco Use    Smoking status: Former Smoker     Years: 20.00     Last attempt to quit: 1997     Years since quittin.9    Smokeless tobacco: Never Used   Substance and Sexual Activity    Alcohol use: No     Alcohol/week: 0.0 standard drinks    Drug use: No    Sexual activity: Yes     Partners: Female     Birth control/protection: None   Lifestyle    Physical activity:     Days per week: Not on file     Minutes per session: Not on file    Stress: Not on file   Relationships    Social connections:     Talks on phone: Not on file     Gets together: Not on file     Attends Restoration service: Not on file     Active member of club or organization: Not on file     Attends meetings of clubs or organizations: Not on file     Relationship status: Not on file    Intimate partner violence:     Fear of current or ex partner: Not on file     Emotionally abused: Not on file     Physically abused: Not on file     Forced sexual activity: Not on file   Other Topics Concern    Not on file   Social History Narrative    Not on file            Visit Vitals  /75   Pulse 61   Temp 98.3 °F (36.8 °C) (Oral)   Resp 18   Ht 4' 11\" (1.499 m)   Wt 277 lb (125.6 kg)   LMP  (LMP Unknown)   SpO2 95%   BMI 55.95 kg/m²       Physical Examination:   General - Well appearing female, morbid obesity  HEENT - PERRL, TM no erythema/opacification, normal nasal turbinates, oropharynx no erythema or exudate, MMM  Neck - supple, no bruits, no TMG, no LAD  Pulm - clear to auscultation bilaterally  Cardio - RRR, normal S1 S2, no murmur gallops or rubs  Abd - soft, nontender, no masses, no HSM  Extrem - no edema, +2 distal pulses  Psych - normal affect, appropriate mood    Lab Results   Component Value Date/Time    WBC 6.4 07/05/2019 08:56 AM    HGB 14.7 07/05/2019 08:56 AM    HCT 43.7 07/05/2019 08:56 AM    PLATELET 591 57/39/8031 08:56 AM    MCV 82 07/05/2019 08:56 AM     Lab Results   Component Value Date/Time    Sodium 139 07/05/2019 08:56 AM    Potassium 4.6 07/05/2019 08:56 AM    Chloride 106 07/05/2019 08:56 AM    CO2 21 07/05/2019 08:56 AM    Anion gap 7 06/19/2019 12:10 PM    Glucose 100 (H) 07/05/2019 08:56 AM    BUN 18 07/05/2019 08:56 AM    Creatinine 0.76 07/05/2019 08:56 AM    BUN/Creatinine ratio 24 07/05/2019 08:56 AM    GFR est AA 93 07/05/2019 08:56 AM    GFR est non-AA 80 07/05/2019 08:56 AM    Calcium 8.9 07/05/2019 08:56 AM     Lab Results   Component Value Date/Time    Cholesterol, total 186 07/05/2019 08:56 AM    HDL Cholesterol 56 07/05/2019 08:56 AM    LDL, calculated 113 (H) 07/05/2019 08:56 AM    VLDL, calculated 17 07/05/2019 08:56 AM    Triglyceride 85 07/05/2019 08:56 AM     Lab Results   Component Value Date/Time    TSH 1.600 03/03/2016 11:52 AM     No results found for: PSA, Ace Mora, [id number], [id number], PSALT  Lab Results Component Value Date/Time    Hemoglobin A1c 5.6 07/18/2017 09:32 AM    Hemoglobin A1c (POC) 5.8 05/27/2016 10:00 AM     Lab Results   Component Value Date/Time    VITAMIN D, 25-HYDROXY 16.8 (L) 06/25/2018 08:46 AM       Lab Results   Component Value Date/Time    ALT (SGPT) 17 07/05/2019 08:56 AM    AST (SGOT) 16 07/05/2019 08:56 AM    Alk. phosphatase 91 07/05/2019 08:56 AM    Bilirubin, total 0.7 07/05/2019 08:56 AM       Morbid obesity Discussed need / benefits for daily exercise and healthy diet   This is the Subsequent Medicare Annual Wellness Exam, performed 12 months or more after the Initial AWV or the last Subsequent AWV    I have reviewed the patient's medical history in detail and updated the computerized patient record. Depression Risk Factor Screening:     3 most recent PHQ Screens 1/8/2019   Little interest or pleasure in doing things Not at all   Feeling down, depressed, irritable, or hopeless Not at all   Total Score PHQ 2 0     Alcohol Risk Factor Screening: You do not drink alcohol or very rarely. Functional Ability and Level of Safety:   Hearing Loss  Hearing is good. Activities of Daily Living  The home contains: no safety equipment. Patient does total self care    Fall Risk  Fall Risk Assessment, last 12 mths 7/19/2019   Able to walk? Yes   Fall in past 12 months? No   Fall with injury? -   Number of falls in past 12 months -   Fall Risk Score -       Abuse Screen  Patient is not abused    Cognitive Screening   Evaluation of Cognitive Function:  Has your family/caregiver stated any concerns about your memory: no  Normal    Patient Care Team   Patient Care Team:  Millie Marino MD as PCP - General (Internal Medicine)    Assessment/Plan   Education and counseling provided:  Are appropriate based on today's review and evaluation    Diagnoses and all orders for this visit:    1.  Paroxysmal atrial fibrillation (HCC)  -rate controlled, on eliquis and on metoprolol 25mg daily  - will follow up with Dr Beth Ortiz  -symptoms improved  - discussed considering a sleep study, patient is doubtful she has sleep apnea but considering    2. Medicare annual wellness visit, subsequent  -     varicella-zoster recombinant, PF, (SHINGRIX, PF,) 50 mcg/0.5 mL susr injection; 0.5mL by IntraMUSCular route once now and then repeat in 2-6 months    3. HTN: better controlled on lisinopril 30mg and metoprolol    4. Anxiety: doing well on current dose of wellbutrin        5.  Overactive bladder: patient is on oxybutinin ( feels it worked better than [employer])

## 2019-07-19 NOTE — PROGRESS NOTES
Identified pt with two pt identifiers(name and ). Reviewed record in preparation for visit and have obtained necessary documentation. Chief Complaint   Patient presents with    Hypertension    ED Follow-up     2019 Afib        Health Maintenance Due   Topic    Shingrix Vaccine Age 50> (1 of 2)    GLAUCOMA SCREENING Q2Y     MEDICARE YEARLY EXAM        Coordination of Care Questionnaire:  :   1) Have you been to an emergency room, urgent care, or hospitalized since your last visit? If yes, where when, and reason for visit? Yes, [facility], Afib       2. Have seen or consulted any other health care provider since your last visit? If yes, where when, and reason for visit? NO          Patient is accompanied by self I have received verbal consent from Margaux Stinson to discuss any/all medical information while they are present in the room.

## 2019-07-19 NOTE — PATIENT INSTRUCTIONS
Medicare Wellness Visit, Female     The best way to live healthy is to have a lifestyle where you eat a well-balanced diet, exercise regularly, limit alcohol use, and quit all forms of tobacco/nicotine, if applicable. Regular preventive services are another way to keep healthy. Preventive services (vaccines, screening tests, monitoring & exams) can help personalize your care plan, which helps you manage your own care. Screening tests can find health problems at the earliest stages, when they are easiest to treat. [facility] follows the current, evidence-based guidelines published by the [facility] ([facility]) when recommending preventive services for our patients. Because we follow these guidelines, sometimes recommendations change over time as research supports it. (For example, mammograms used to be recommended annually. Even though Medicare will still pay for an annual mammogram, the newer guidelines recommend a mammogram every two years for women of average risk.)  Of course, you and your doctor may decide to screen more often for some diseases, based on your risk and your health status. Preventive services for you include:  - Medicare offers their members a free annual wellness visit, which is time for you and your primary care provider to discuss and plan for your preventive service needs. Take advantage of this benefit every year!  -All adults over the age of 72 should receive the recommended pneumonia vaccines. Current USPSTF guidelines recommend a series of two vaccines for the best pneumonia protection.   -All adults should have a flu vaccine yearly and a tetanus vaccine every 10 years. All adults age 61 and older should receive a shingles vaccine once in their lifetime.    -A bone mass density test is recommended when a woman turns 65 to screen for osteoporosis. This test is only recommended one time, as a screening.  Some providers will use this same test as a disease monitoring tool if you already have osteoporosis. -All adults age 38-68 who are overweight should have a diabetes screening test once every three years.   -Other screening tests and preventive services for persons with diabetes include: an eye exam to screen for diabetic retinopathy, a kidney function test, a foot exam, and stricter control over your cholesterol.   -Cardiovascular screening for adults with routine risk involves an electrocardiogram (ECG) at intervals determined by your doctor.   -Colorectal cancer screenings should be done for adults age 54-65 with no increased risk factors for colorectal cancer. There are a number of acceptable methods of screening for this type of cancer. Each test has its own benefits and drawbacks. Discuss with your doctor what is most appropriate for you during your annual wellness visit. The different tests include: colonoscopy (considered the best screening method), a fecal occult blood test, a fecal DNA test, and sigmoidoscopy. -Breast cancer screenings are recommended every other year for women of normal risk, age 54-69.  -Cervical cancer screenings for women over age 72 are only recommended with certain risk factors.   -All adults born between [facility] should be screened once for Hepatitis C.      Here is a list of your current Health Maintenance items (your personalized list of preventive services) with a due date:  Health Maintenance Due   Topic Date Due    Shingles Vaccine (1 of 2) Given prescription          Annual Well Visit  Done today

## 2019-07-30 ENCOUNTER — TELEPHONE (OUTPATIENT)
Dept: INTERNAL MEDICINE CLINIC | Age: 69
End: 2019-07-30
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
## 2019-07-30 NOTE — TELEPHONE ENCOUNTER
Pt states that when she was setting up her MyChart today that she noticed two letters in regards to having someone looked at her spine. Pt states that she doesn't recall any back work being done on her and wants to confirm that the information is being sent to the right person.  Best contact number [phone]       Message received & copied from [facility]

## 2019-08-12 DIAGNOSIS — K21.9 GASTROESOPHAGEAL REFLUX DISEASE WITHOUT ESOPHAGITIS: ICD-10-CM

## 2019-08-12 RX ORDER — OMEPRAZOLE 20 MG/1
CAPSULE, DELAYED RELEASE ORAL
Qty: 90 CAP | Refills: 1 | Status: SHIPPED | OUTPATIENT
Start: 2019-08-12 | End: 2020-02-19

## 2019-08-26 DIAGNOSIS — R79.89 LOW VITAMIN D LEVEL: ICD-10-CM

## 2019-08-27 ENCOUNTER — HOSPITAL ENCOUNTER (OUTPATIENT)
Dept: NON INVASIVE DIAGNOSTICS | Age: 69
Discharge: HOME | End: 2019-08-27
Payer: MEDICARE

## 2019-08-27 VITALS
DIASTOLIC BLOOD PRESSURE: 74 MMHG | RESPIRATION RATE: 20 BRPM | HEIGHT: 59 IN | SYSTOLIC BLOOD PRESSURE: 138 MMHG | BODY MASS INDEX: 54.43 KG/M2 | HEART RATE: 60 BPM | WEIGHT: 270 LBS | OXYGEN SATURATION: 96 %

## 2019-08-27 DIAGNOSIS — I48.91 ATRIAL FIBRILLATION, UNSPECIFIED TYPE (HCC): ICD-10-CM

## 2019-08-27 LAB
ATRIAL RATE: 70 BPM
CALCULATED P AXIS, ECG09: 19 DEGREES
CALCULATED R AXIS, ECG10: -33 DEGREES
CALCULATED T AXIS, ECG11: 10 DEGREES
DIAGNOSIS, 93000: NORMAL
P-R INTERVAL, ECG05: 184 MS
Q-T INTERVAL, ECG07: 434 MS
QRS DURATION, ECG06: 84 MS
QTC CALCULATION (BEZET), ECG08: 468 MS
VENTRICULAR RATE, ECG03: 70 BPM

## 2019-08-27 PROCEDURE — 74011250637 HC RX REV CODE- 250/637: Performed by: INTERNAL MEDICINE

## 2019-08-27 PROCEDURE — 92960 CARDIOVERSION ELECTRIC EXT: CPT

## 2019-08-27 PROCEDURE — 93005 ELECTROCARDIOGRAM TRACING: CPT

## 2019-08-27 PROCEDURE — 99152 MOD SED SAME PHYS/QHP 5/>YRS: CPT

## 2019-08-27 PROCEDURE — 74011250636 HC RX REV CODE- 250/636

## 2019-08-27 PROCEDURE — 77030018729 HC ELECTRD DEFIB PAD CARD -B

## 2019-08-27 RX ORDER — ERGOCALCIFEROL 1.25 MG/1
CAPSULE ORAL
Qty: 12 CAP | Refills: 0 | Status: SHIPPED | OUTPATIENT
Start: 2019-08-27 | End: 2020-03-14

## 2019-08-27 RX ORDER — FENTANYL CITRATE 50 UG/ML
25-50 INJECTION, SOLUTION INTRAMUSCULAR; INTRAVENOUS
Status: DISCONTINUED | OUTPATIENT
Start: 2019-08-27 | End: 2019-08-27

## 2019-08-27 RX ORDER — FLECAINIDE ACETATE 100 MG/1
100 TABLET ORAL ONCE
Status: COMPLETED | OUTPATIENT
Start: 2019-08-27 | End: 2019-08-27

## 2019-08-27 RX ORDER — FLECAINIDE ACETATE 100 MG/1
100 TABLET ORAL 2 TIMES DAILY
Qty: 60 TAB | Refills: 11 | Status: SHIPPED | OUTPATIENT
Start: 2019-08-27

## 2019-08-27 RX ORDER — MIDAZOLAM HYDROCHLORIDE 1 MG/ML
.5-2 INJECTION, SOLUTION INTRAMUSCULAR; INTRAVENOUS
Status: DISCONTINUED | OUTPATIENT
Start: 2019-08-27 | End: 2019-08-27

## 2019-08-27 RX ADMIN — FENTANYL CITRATE 25 MCG: 50 INJECTION, SOLUTION INTRAMUSCULAR; INTRAVENOUS at 12:44

## 2019-08-27 RX ADMIN — FLECAINIDE ACETATE 100 MG: 100 TABLET ORAL at 13:32

## 2019-08-27 RX ADMIN — MIDAZOLAM HYDROCHLORIDE 2 MG: 1 INJECTION, SOLUTION INTRAMUSCULAR; INTRAVENOUS at 12:44

## 2019-08-27 RX ADMIN — FENTANYL CITRATE 50 MCG: 50 INJECTION, SOLUTION INTRAMUSCULAR; INTRAVENOUS at 12:53

## 2019-08-27 RX ADMIN — MIDAZOLAM HYDROCHLORIDE 1 MG: 1 INJECTION, SOLUTION INTRAMUSCULAR; INTRAVENOUS at 12:49

## 2019-08-27 RX ADMIN — MIDAZOLAM HYDROCHLORIDE 1 MG: 1 INJECTION, SOLUTION INTRAMUSCULAR; INTRAVENOUS at 12:52

## 2019-08-27 RX ADMIN — MIDAZOLAM HYDROCHLORIDE 1 MG: 1 INJECTION, SOLUTION INTRAMUSCULAR; INTRAVENOUS at 12:47

## 2019-08-27 NOTE — DISCHARGE INSTRUCTIONS
AFTER YOUR CARDIOVERSION    Be sure someone else drives you home; do not drive for 24 hours. You may feel drowsy for several hours. Do not eat or drink for at least two hours after your procedure. Be careful when you do eat or drink for the first time especially with hot fluids since you could easily burn your throat. Call your doctor if:    · You have trouble breathing all of a sudden. · You have chest pain or any pain that spreads to your neck, jaw, or arms. · You have questions or concerns. Doctor: Fabricio Siddiqui    Special Instructions: May return to work 8/29/2019 per Dr Fabricio Siddiqui  No driving for 24 hours.

## 2019-08-27 NOTE — PROGRESS NOTES
Brief Procedure Note    Patient: Nubia Samuel MRN: [id number]  SSN: [id number]    YOB: 1950  Age: 71 y.o. Sex: female      Date of Procedure: 8/27/2019     Pre-procedure Diagnosis: Persistent AFib    Post-procedure Diagnosis: Successful DCCV to SR    Procedure: DCCV    Performed By: Madison Zaman III, DO     Anesthesia: Moderate Sedation    Estimated Blood Loss: Less than 10 mL      Specimens:  None    Findings: as above    Complications: None    Implants: None    Recommendations: Continue medical therapy. Add flecainide.     Signed By: Davie Bruno DO     August 27, 2019

## 2019-08-27 NOTE — PROGRESS NOTES
Pt sedated with 5mg Versed and 75mcg Fentanyl for Cardioversion, given 1 synchronized shock(s) at 360 Joules, Afib converted to NSR. (monitored sedation from 1244 to 1258)    EKG obtained

## 2019-08-27 NOTE — PROGRESS NOTES
Report given to Sugey Eller. RN who assumed care of pt to recover, pt awake and verbally interacting, advised RN of Flecainide medication to be administered prior to discharge. Advised daughter Doyle Bermeo is in waiting area.

## 2019-08-27 NOTE — PROGRESS NOTES
Discharge instructions reviewed with patient and daughter. Allowed adequate time to ask questions, all questions answered. Printed copy of AVS given to patient. All belongings gathered, IV and tele discontinued. Transported via wheelchair to main entrance and into care of family.

## 2019-08-27 NOTE — PROGRESS NOTES
Received report from Dunia Nicole. Pt received to recovery area without distress. NSR continues pt remains pain free. VSS.   Pt A&O x 3

## 2019-08-30 ENCOUNTER — TELEPHONE (OUTPATIENT)
Dept: INTERNAL MEDICINE CLINIC | Age: 69
End: 2019-08-30

## 2019-08-30 NOTE — TELEPHONE ENCOUNTER
----- Message from Donna Maradiaga sent at 8/30/2019  3:02 PM EDT -----  Regarding: Dr. Mccullough Luke: [phone]  Pt advised she has a moving pain and would like to be seen sometime next week. Pt best contact [phone].

## 2019-09-03 NOTE — TELEPHONE ENCOUNTER
Spoke with patient. Two pt identifiers confirmed. Patient states that she was calling to schedule an appointment for neck pain that she was having, but it seems to have subsided now. Patient advised should it return to give us a call and we can schedule her an appointment to see Dr. Andrew Suggs. Pt verbalized understanding of information discussed w/ no further questions at this time.

## 2019-09-13 NOTE — TELEPHONE ENCOUNTER
ALPRAZolam (XANAX) 0.25 mg tablet [[id number]]     Order Details   Dose: 0.25 mg Route: Oral Frequency: DAILY AS NEEDED for Anxiety   Dispense Quantity: 15 Tab Refills: 0 Fills remaining: --           Sig: Take 1 Tab by mouth daily as needed for Anxiety. TAKE 1 TABLET BY MOUTH ONCE DAILY AS NEEDED FOR ANXIETY          Written Date: 09/13/19 Expiration Date: --     Start Date: 09/13/19 End Date: --            Ordering Provider: -- KELLY #:  -- NPI:  --    Authorizing Provider: Pablo Salgado MD KELLY #:  [id number] NPI:  [id number]    Ordering User:  Pablo Salgado MD            Diagnosis Association: Anxiety (F41.9);  Grief (F43.21)      Original Order:  ALPRAZolam Sammye Altonah) 0.25 mg tablet [[id number]]      Pharmacy:  [facility] [address] [facility] [address] [place] [address], [address] [place] [address]        Prescription has been called in to [facility]

## 2019-09-21 DIAGNOSIS — I10 ESSENTIAL HYPERTENSION: ICD-10-CM

## 2019-09-23 RX ORDER — BENAZEPRIL HYDROCHLORIDE 20 MG/1
TABLET ORAL
Qty: 90 TAB | Refills: 1 | Status: SHIPPED | OUTPATIENT
Start: 2019-09-23 | End: 2020-11-22 | Stop reason: SDUPTHER

## 2019-11-08 ENCOUNTER — PATIENT OUTREACH (OUTPATIENT)
Dept: INTERNAL MEDICINE CLINIC | Age: 69
End: 2019-11-08

## 2019-11-08 NOTE — PROGRESS NOTES
Ambulatory Care Manager outreached to patient today to offer care management services. Introduction to self and role of care manager provided. Patient declined care management services at this time. No follow up call scheduled at this time. Patient has Ambulatory Care Manager's contact number for for any questions or concerns.

## 2019-11-13 ENCOUNTER — TELEPHONE (OUTPATIENT)
Dept: INTERNAL MEDICINE CLINIC | Age: 69
End: 2019-11-13

## 2019-11-13 NOTE — TELEPHONE ENCOUNTER
Spoke with patient. Two pt identifiers confirmed. Patient offered an appointment with Dr. David Esquivel on 12/09/19. Appointment accepted. Patient advised if anything changes or if unable to keep this appointment to call the office  Pt verbalized understanding of information discussed w/ no further questions at this time.

## 2019-11-13 NOTE — TELEPHONE ENCOUNTER
#[phone] or #[phone] (leave a vm)     Pt is trying to schedule a missed appt. And can't wait until end of Jan.  Please call to schedule.

## 2019-11-18 DIAGNOSIS — F41.9 ANXIETY: ICD-10-CM

## 2019-11-18 DIAGNOSIS — F43.21 GRIEF: ICD-10-CM

## 2019-11-19 RX ORDER — ALPRAZOLAM 0.25 MG/1
TABLET ORAL
Qty: 15 TAB | Refills: 0 | Status: SHIPPED | OUTPATIENT
Start: 2019-11-19

## 2019-12-09 ENCOUNTER — OFFICE VISIT (OUTPATIENT)
Dept: INTERNAL MEDICINE CLINIC | Age: 69
End: 2019-12-09

## 2020-01-12 DIAGNOSIS — I48.0 PAROXYSMAL ATRIAL FIBRILLATION (HCC): ICD-10-CM

## 2020-01-13 RX ORDER — APIXABAN 5 MG/1
TABLET, FILM COATED ORAL
Qty: 60 TAB | Refills: 0 | Status: SHIPPED | OUTPATIENT
Start: 2020-01-13 | End: 2020-02-19

## 2020-01-17 DIAGNOSIS — I10 ESSENTIAL HYPERTENSION: ICD-10-CM

## 2020-01-17 RX ORDER — BENAZEPRIL HYDROCHLORIDE 10 MG/1
TABLET ORAL
Qty: 90 TAB | Refills: 0 | OUTPATIENT
Start: 2020-01-17 | End: 2020-11-22

## 2020-02-18 DIAGNOSIS — I48.0 PAROXYSMAL ATRIAL FIBRILLATION (HCC): ICD-10-CM

## 2020-02-18 DIAGNOSIS — K21.9 GASTROESOPHAGEAL REFLUX DISEASE WITHOUT ESOPHAGITIS: ICD-10-CM

## 2020-02-19 RX ORDER — APIXABAN 5 MG/1
TABLET, FILM COATED ORAL
Qty: 60 TAB | Refills: 0 | Status: SHIPPED | OUTPATIENT
Start: 2020-02-19 | End: 2020-03-14

## 2020-02-19 RX ORDER — OMEPRAZOLE 20 MG/1
CAPSULE, DELAYED RELEASE ORAL
Qty: 90 CAP | Refills: 0 | Status: SHIPPED | OUTPATIENT
Start: 2020-02-19 | End: 2020-06-23

## 2020-03-14 DIAGNOSIS — R79.89 LOW VITAMIN D LEVEL: ICD-10-CM

## 2020-03-14 DIAGNOSIS — I48.0 PAROXYSMAL ATRIAL FIBRILLATION (HCC): ICD-10-CM

## 2020-03-14 RX ORDER — APIXABAN 5 MG/1
TABLET, FILM COATED ORAL
Qty: 60 TAB | Refills: 0 | Status: SHIPPED | OUTPATIENT
Start: 2020-03-14 | End: 2020-05-18

## 2020-03-14 RX ORDER — ERGOCALCIFEROL 1.25 MG/1
CAPSULE ORAL
Qty: 12 CAP | Refills: 0 | Status: SHIPPED | OUTPATIENT
Start: 2020-03-14

## 2020-05-18 DIAGNOSIS — I48.0 PAROXYSMAL ATRIAL FIBRILLATION (HCC): ICD-10-CM

## 2020-05-18 RX ORDER — APIXABAN 5 MG/1
TABLET, FILM COATED ORAL
Qty: 60 TAB | Refills: 0 | Status: SHIPPED | OUTPATIENT
Start: 2020-05-18 | End: 2020-06-17 | Stop reason: SDUPTHER

## 2020-06-01 ENCOUNTER — HOSPITAL ENCOUNTER (OUTPATIENT)
Dept: NON INVASIVE DIAGNOSTICS | Age: 70
Discharge: HOME | End: 2020-06-01
Attending: INTERNAL MEDICINE
Payer: MEDICARE

## 2020-06-01 VITALS
WEIGHT: 270 LBS | HEIGHT: 59 IN | DIASTOLIC BLOOD PRESSURE: 90 MMHG | RESPIRATION RATE: 15 BRPM | HEART RATE: 53 BPM | OXYGEN SATURATION: 98 % | BODY MASS INDEX: 54.43 KG/M2 | SYSTOLIC BLOOD PRESSURE: 119 MMHG

## 2020-06-01 DIAGNOSIS — I48.91 ATRIAL FIBRILLATION, UNSPECIFIED TYPE (HCC): ICD-10-CM

## 2020-06-01 LAB
ATRIAL RATE: 55 BPM
CALCULATED R AXIS, ECG10: -35 DEGREES
DIAGNOSIS, 93000: NORMAL
P-R INTERVAL, ECG05: 132 MS
Q-T INTERVAL, ECG07: 430 MS
QRS DURATION, ECG06: 90 MS
QTC CALCULATION (BEZET), ECG08: 411 MS
VENTRICULAR RATE, ECG03: 55 BPM

## 2020-06-01 PROCEDURE — 77030018729 HC ELECTRD DEFIB PAD CARD -B

## 2020-06-01 PROCEDURE — 93005 ELECTROCARDIOGRAM TRACING: CPT

## 2020-06-01 PROCEDURE — 74011250636 HC RX REV CODE- 250/636: Performed by: INTERNAL MEDICINE

## 2020-06-01 PROCEDURE — 92960 CARDIOVERSION ELECTRIC EXT: CPT

## 2020-06-01 PROCEDURE — 99152 MOD SED SAME PHYS/QHP 5/>YRS: CPT

## 2020-06-01 RX ORDER — FENTANYL CITRATE 50 UG/ML
25-50 INJECTION, SOLUTION INTRAMUSCULAR; INTRAVENOUS
Status: DISCONTINUED | OUTPATIENT
Start: 2020-06-01 | End: 2020-06-01

## 2020-06-01 RX ORDER — MIDAZOLAM HYDROCHLORIDE 1 MG/ML
.5-2 INJECTION INTRAMUSCULAR; INTRAVENOUS
Status: DISCONTINUED | OUTPATIENT
Start: 2020-06-01 | End: 2020-06-01

## 2020-06-01 RX ORDER — MIDAZOLAM HYDROCHLORIDE 1 MG/ML
.5-2 INJECTION INTRAMUSCULAR; INTRAVENOUS ONCE
Status: COMPLETED | OUTPATIENT
Start: 2020-06-01 | End: 2020-06-01

## 2020-06-01 RX ADMIN — MIDAZOLAM HYDROCHLORIDE 1 MG: 1 INJECTION, SOLUTION INTRAMUSCULAR; INTRAVENOUS at 10:22

## 2020-06-01 RX ADMIN — FENTANYL CITRATE 25 MCG: 50 INJECTION INTRAMUSCULAR; INTRAVENOUS at 10:22

## 2020-06-01 RX ADMIN — MIDAZOLAM HYDROCHLORIDE 1 MG: 1 INJECTION, SOLUTION INTRAMUSCULAR; INTRAVENOUS at 10:31

## 2020-06-01 RX ADMIN — MIDAZOLAM HYDROCHLORIDE 1 MG: 1 INJECTION, SOLUTION INTRAMUSCULAR; INTRAVENOUS at 10:27

## 2020-06-01 RX ADMIN — MIDAZOLAM HYDROCHLORIDE 1 MG: 1 INJECTION, SOLUTION INTRAMUSCULAR; INTRAVENOUS at 10:25

## 2020-06-01 RX ADMIN — FENTANYL CITRATE 25 MCG: 50 INJECTION INTRAMUSCULAR; INTRAVENOUS at 10:33

## 2020-06-01 NOTE — PROCEDURES
[address]  [phone]    Patient ID:  Patient: Emely Rodriguez  MRN: [id number]  Age: 71 y.o.  : 1950  Gender: female  Study Date: 2020    History: This is a female with persistent atrial fibrillation here for cardioversion. She is on flecainide and Eliquis. Procedure:  Electrical Cardioversion (49330)  The patient was brought to the noninvasive lab in a postabsorptive state after informed consent had been previously obtained. Continuous electrocardiographic and hemodynamic monitoring was performed. Sedation was provided using Versed and fentanyl by the nurse who was in constant attendance and my supervision throughout the procedure, sedation time 10:21-10:36 (15 minutes). Shock pads were placed in the left anterior-right posterior or anterior-apical position and R wave synchronized shock energy was delivered. The patient was recovered, with satisfactory hemodynamics, no immediate complication noted. Preoperative Diagnosis: As above. Postoperative Diagnosis: As above. Procedure:  As above. Surgeon(s) and Role:  Wu Castrejon MD - Primary   Anesthesia:   MAC. Estimated Blood Loss:  <5 cc. Specimens: * No specimens in log *   Findings:  As below. Complications:  None. Findings:  1. Baseline atrial fibrillation. 2.  Successful cardioversion using 300 J shock energy to restore sinus rhythm. Recommendations:   Continue antiarrhythmic therapy and anticoagulation. Follow-up in 4-6 weeks in the office.     Signed By: Thanh Ford MD     2020

## 2020-06-01 NOTE — DISCHARGE INSTRUCTIONS
Cardiology Discharge Instructions                Patient ID:  Sean Choudhury  [id number]  53 y.o.  1950    Admit Date: 6/1/2020    Discharge Date: 6/1/2020   Physician: Farnaz Cabrera MD    Cardiology Procedures this Admission:  1. Electrical cardioversion successfully converting atrial fibrillation to sinus rhythm    Visit Vitals  BP (!) 126/111   Pulse (!) 58   Resp 17   Ht 4' 11\" (1.499 m)   Wt 122.5 kg (270 lb)   LMP  (LMP Unknown)   SpO2 97%   BMI 54.53 kg/m²       Disposition: home with a     Patient Instructions:   No driving, operating heavy machinery, or signing legal documents today (due to sedation earlier). Continue taking your medications without change. FOLLOW-UP:  Call the office [phone] to make an appointment for 6 weeks with Dr. Jaspreet Schroeder or one of the nurse practitioners. This can be a virtual visit (telemedicine) if you wish, though might be useful to get an ECG. Will review symptoms and whether or not the flecainide is helping hold the normal sinus rhythm. [address] [facility], [address]    [phone]  [address]    [email or web address]    Thank you for placing your trust for your heart with the physicians and staff of [facility] We have long provided [place] with the most advanced cardiovascular care possible using a personalized and caring approach. And we hope to continue this strong tradition well into the future.     Signed:  Farnaz Cabrera MD  6/1/2020

## 2020-06-01 NOTE — PROGRESS NOTES
Pt sedated with 4mg Versed and 50mcg Fentanyl for Cardioversion, given 1 synchronized shock(s) at 300 Joules, Afib converted to NSR. (monitored sedation from 1021 to 1036)

## 2020-06-01 NOTE — H&P
EP/ ARRHYTHMIA Preprocedure Note    Patient ID:  Patient: Tyron Dorsey  MRN: [id number]  Age: 71 y.o.  : 1950    Date of  Admission: 2020  8:40 AM   PCP:  Ezio Caal MD    Assessment: 1. Persistent atrial fibrillation. Plan:     1. Given the potential benefits, risks, and alternatives, she agrees to proceed with cardioversion. 2. Continue antiarrhythmic therapy and anticoagulation afterward. Tyron Dorsey is a 71 y.o. female with a history of the above here for her procedure.       Past Medical History:   Diagnosis Date    A-fib ([facility] [address]     Arthritis     Hypertension     Osteopenia         Past Surgical History:   Procedure Laterality Date    HX CHOLECYSTECTOMY      HX HEENT      HX ORTHOPAEDIC      Bilateral Knee Replacement       Social History     Tobacco Use    Smoking status: Former Smoker     Years: 20.00     Last attempt to quit: 1997     Years since quittin.8    Smokeless tobacco: Never Used   Substance Use Topics    Alcohol use: No     Alcohol/week: 0.0 standard drinks        Family History   Problem Relation Age of Onset    Cancer Mother         pancreatic cancer    Cancer Brother         Allergies   Allergen Reactions    Latex Rash    Adhesive Rash    Raspberry Rash    Tetracycline Hives          Current Outpatient Medications   Medication Sig    Eliquis 5 mg tablet Take 1 tablet by mouth twice daily    Vitamin D2 1,250 mcg (50,000 unit) capsule Take 1 capsule by mouth once a week    omeprazole (PRILOSEC) 20 mg capsule TAKE 1 CAPSULE BY MOUTH ONCE DAILY    benazepriL (LOTENSIN) 10 mg tablet TAKE 1 TABLET BY MOUTH ONCE DAILY    ALPRAZolam (XANAX) 0.25 mg tablet TAKE 1 TABLET BY MOUTH ONCE DAILY AS NEEDED FOR ANXIETY    benazepril (LOTENSIN) 20 mg tablet TAKE 1 TABLET BY MOUTH ONCE DAILY    cyclobenzaprine (FLEXERIL) 5 mg tablet TAKE 1 TABLET BY MOUTH THREE TIMES DAILY AS NEEDED FOR MUSCLE SPASM    flecainide (TAMBOCOR) 100 mg tablet Take 1 Tab by mouth two (2) times a day. (Patient taking differently: Take 150 mg by mouth two (2) times a day.)    oxybutynin chloride XL (DITROPAN XL) 10 mg CR tablet TAKE 1 TABLET BY MOUTH ONCE DAILY    buPROPion XL (WELLBUTRIN XL) 150 mg tablet TAKE 1 TABLET BY MOUTH ONCE DAILY IN THE MORNING    furosemide (LASIX) 20 mg tablet Take 1 Tab by mouth daily.  varicella-zoster recombinant, PF, (SHINGRIX, PF,) 50 mcg/0.5 mL susr injection 0.5mL by IntraMUSCular route once now and then repeat in 2-6 months    metoprolol succinate (TOPROL-XL) 25 mg XL tablet Take 1 Tab by mouth daily. (Patient taking differently: Take 25 mg by mouth daily. 1/2 tablet)    B.infantis-B.ani-B.long-B.bifi (PROBIOTIC 4X) 10-15 mg TbEC Take  by mouth daily. Indications: Pt takes 1 probiotic tablet daily     No current facility-administered medications for this encounter. Review of Symptoms:  Noncontributory. Objective:      Physical Exam:  No data recorded. Patient Vitals for the past 8 hrs:   Pulse   06/01/20 1023 86   06/01/20 0932 71   06/01/20 0924 72    Patient Vitals for the past 8 hrs:   Resp   06/01/20 1023 19   06/01/20 0932 16   06/01/20 0924 14    Patient Vitals for the past 8 hrs:   BP   06/01/20 1023 (!) 163/94   06/01/20 0932 182/83   06/01/20 0924 (!) 160/100      No intake or output data in the 24 hours ending 06/01/20 1047    Nondiaphoretic, not in acute distress. Irregularly irregular rhythm. Neuro grossly nonfocal.  No tremor. Awake and appropriate. CARDIOGRAPHICS and STUDIES, I reviewed:    Telemetry:  Atrial fibrillation. Labs:  OP values reviewed.       Escobar Menjivar MD  6/1/2020

## 2020-06-01 NOTE — PROGRESS NOTES
Patient arrived to Non-Invasive Cardiology Lab for Out Patient Cardioversion Procedure. Staff introduced to patient. Patient identifiers verified with Name and Date of Birth. Procedure verified with patient. Consent forms reviewed and signed by patient or authorized representative and verified. Allergies verified. Patient informed of procedure and plan of care. Questions answered with review. Patient on cardiac monitor, non-invasive blood pressure, SPO2 monitor. On room air. Patient is A&Ox3. Patient reports no complaints. Patient on stretcher, in low position, with side rails up. Patient instructed to call for assistance as needed.     Family waiting in car

## 2020-06-16 DIAGNOSIS — I48.0 PAROXYSMAL ATRIAL FIBRILLATION (HCC): ICD-10-CM

## 2020-06-17 RX ORDER — APIXABAN 5 MG/1
TABLET, FILM COATED ORAL
Qty: 60 TAB | Refills: 0 | Status: SHIPPED | OUTPATIENT
Start: 2020-06-17

## 2020-06-23 DIAGNOSIS — K21.9 GASTROESOPHAGEAL REFLUX DISEASE WITHOUT ESOPHAGITIS: ICD-10-CM

## 2020-06-23 RX ORDER — OMEPRAZOLE 20 MG/1
CAPSULE, DELAYED RELEASE ORAL
Qty: 90 CAP | Refills: 0 | Status: SHIPPED | OUTPATIENT
Start: 2020-06-23 | End: 2020-12-02

## 2020-07-20 DIAGNOSIS — I48.0 PAROXYSMAL ATRIAL FIBRILLATION (HCC): ICD-10-CM

## 2020-07-20 RX ORDER — APIXABAN 5 MG/1
TABLET, FILM COATED ORAL
Qty: 60 TAB | Refills: 0 | OUTPATIENT
Start: 2020-07-20

## 2020-07-21 DIAGNOSIS — I48.0 PAROXYSMAL ATRIAL FIBRILLATION (HCC): ICD-10-CM

## 2020-07-22 RX ORDER — APIXABAN 5 MG/1
TABLET, FILM COATED ORAL
Qty: 60 TAB | Refills: 0 | OUTPATIENT
Start: 2020-07-22

## 2020-11-22 ENCOUNTER — APPOINTMENT (OUTPATIENT)
Dept: GENERAL RADIOLOGY | Age: 70
End: 2020-11-22
Attending: EMERGENCY MEDICINE
Payer: MEDICARE

## 2020-11-22 ENCOUNTER — HOSPITAL ENCOUNTER (EMERGENCY)
Age: 70
Discharge: HOME | End: 2020-11-22
Attending: EMERGENCY MEDICINE
Payer: MEDICARE

## 2020-11-22 VITALS
OXYGEN SATURATION: 98 % | SYSTOLIC BLOOD PRESSURE: 135 MMHG | DIASTOLIC BLOOD PRESSURE: 92 MMHG | BODY MASS INDEX: 54.43 KG/M2 | TEMPERATURE: 98.1 F | WEIGHT: 270 LBS | RESPIRATION RATE: 18 BRPM | HEIGHT: 59 IN | HEART RATE: 76 BPM

## 2020-11-22 DIAGNOSIS — R06.02 SOB (SHORTNESS OF BREATH): ICD-10-CM

## 2020-11-22 DIAGNOSIS — R00.1 BRADYCARDIA: ICD-10-CM

## 2020-11-22 DIAGNOSIS — I48.91 ATRIAL FIBRILLATION, UNSPECIFIED TYPE (HCC): Primary | ICD-10-CM

## 2020-11-22 DIAGNOSIS — I10 ESSENTIAL HYPERTENSION: ICD-10-CM

## 2020-11-22 LAB
ALBUMIN SERPL-MCNC: 3.2 G/DL (ref 3.5–5)
ALBUMIN/GLOB SERPL: 0.9 {RATIO} (ref 1.1–2.2)
ALP SERPL-CCNC: 112 U/L (ref 45–117)
ALT SERPL-CCNC: 75 U/L (ref 12–78)
ANION GAP SERPL CALC-SCNC: 5 MMOL/L (ref 5–15)
AST SERPL-CCNC: 59 U/L (ref 15–37)
ATRIAL RATE: 38 BPM
BASOPHILS # BLD: 0.1 K/UL (ref 0–0.1)
BASOPHILS NFR BLD: 1 % (ref 0–1)
BILIRUB SERPL-MCNC: 0.6 MG/DL (ref 0.2–1)
BNP SERPL-MCNC: 2074 PG/ML
BUN SERPL-MCNC: 17 MG/DL (ref 6–20)
BUN/CREAT SERPL: 20 (ref 12–20)
CALCIUM SERPL-MCNC: 8.8 MG/DL (ref 8.5–10.1)
CALCULATED R AXIS, ECG10: -42 DEGREES
CALCULATED T AXIS, ECG11: 9 DEGREES
CHLORIDE SERPL-SCNC: 110 MMOL/L (ref 97–108)
CK SERPL-CCNC: 55 U/L (ref 26–192)
CO2 SERPL-SCNC: 25 MMOL/L (ref 21–32)
COMMENT, HOLDF: NORMAL
CREAT SERPL-MCNC: 0.87 MG/DL (ref 0.55–1.02)
DIAGNOSIS, 93000: NORMAL
DIFFERENTIAL METHOD BLD: ABNORMAL
EOSINOPHIL # BLD: 0.1 K/UL (ref 0–0.4)
EOSINOPHIL NFR BLD: 2 % (ref 0–7)
ERYTHROCYTE [DISTWIDTH] IN BLOOD BY AUTOMATED COUNT: 15.5 % (ref 11.5–14.5)
GLOBULIN SER CALC-MCNC: 3.5 G/DL (ref 2–4)
GLUCOSE SERPL-MCNC: 120 MG/DL (ref 65–100)
HCT VFR BLD AUTO: 41.1 % (ref 35–47)
HGB BLD-MCNC: 13.4 G/DL (ref 11.5–16)
IMM GRANULOCYTES # BLD AUTO: 0 K/UL (ref 0–0.04)
IMM GRANULOCYTES NFR BLD AUTO: 0 % (ref 0–0.5)
LYMPHOCYTES # BLD: 1.3 K/UL (ref 0.8–3.5)
LYMPHOCYTES NFR BLD: 20 % (ref 12–49)
MAGNESIUM SERPL-MCNC: 2 MG/DL (ref 1.6–2.4)
MCH RBC QN AUTO: 28.2 PG (ref 26–34)
MCHC RBC AUTO-ENTMCNC: 32.6 G/DL (ref 30–36.5)
MCV RBC AUTO: 86.5 FL (ref 80–99)
MONOCYTES # BLD: 0.6 K/UL (ref 0–1)
MONOCYTES NFR BLD: 10 % (ref 5–13)
NEUTS SEG # BLD: 4.2 K/UL (ref 1.8–8)
NEUTS SEG NFR BLD: 67 % (ref 32–75)
NRBC # BLD: 0 K/UL (ref 0–0.01)
NRBC BLD-RTO: 0 PER 100 WBC
PHOSPHATE SERPL-MCNC: 3 MG/DL (ref 2.6–4.7)
PLATELET # BLD AUTO: 186 K/UL (ref 150–400)
PMV BLD AUTO: 12.1 FL (ref 8.9–12.9)
POTASSIUM SERPL-SCNC: 4.1 MMOL/L (ref 3.5–5.1)
PROT SERPL-MCNC: 6.7 G/DL (ref 6.4–8.2)
Q-T INTERVAL, ECG07: 486 MS
QRS DURATION, ECG06: 102 MS
QTC CALCULATION (BEZET), ECG08: 434 MS
RBC # BLD AUTO: 4.75 M/UL (ref 3.8–5.2)
SAMPLES BEING HELD,HOLD: NORMAL
SODIUM SERPL-SCNC: 140 MMOL/L (ref 136–145)
TROPONIN I SERPL-MCNC: <0.05 NG/ML
VENTRICULAR RATE, ECG03: 48 BPM
WBC # BLD AUTO: 6.2 K/UL (ref 3.6–11)

## 2020-11-22 PROCEDURE — 83880 ASSAY OF NATRIURETIC PEPTIDE: CPT

## 2020-11-22 PROCEDURE — 93005 ELECTROCARDIOGRAM TRACING: CPT

## 2020-11-22 PROCEDURE — 83735 ASSAY OF MAGNESIUM: CPT

## 2020-11-22 PROCEDURE — 84484 ASSAY OF TROPONIN QUANT: CPT

## 2020-11-22 PROCEDURE — 36415 COLL VENOUS BLD VENIPUNCTURE: CPT

## 2020-11-22 PROCEDURE — 99285 EMERGENCY DEPT VISIT HI MDM: CPT

## 2020-11-22 PROCEDURE — 85025 COMPLETE CBC W/AUTO DIFF WBC: CPT

## 2020-11-22 PROCEDURE — 71045 X-RAY EXAM CHEST 1 VIEW: CPT

## 2020-11-22 PROCEDURE — 84100 ASSAY OF PHOSPHORUS: CPT

## 2020-11-22 PROCEDURE — 96374 THER/PROPH/DIAG INJ IV PUSH: CPT

## 2020-11-22 PROCEDURE — 82550 ASSAY OF CK (CPK): CPT

## 2020-11-22 PROCEDURE — 74011250636 HC RX REV CODE- 250/636: Performed by: EMERGENCY MEDICINE

## 2020-11-22 PROCEDURE — 80053 COMPREHEN METABOLIC PANEL: CPT

## 2020-11-22 RX ORDER — FUROSEMIDE 10 MG/ML
40 INJECTION INTRAMUSCULAR; INTRAVENOUS
Status: COMPLETED | OUTPATIENT
Start: 2020-11-22 | End: 2020-11-22

## 2020-11-22 RX ORDER — BENAZEPRIL HYDROCHLORIDE 20 MG/1
TABLET ORAL
Qty: 90 TAB | Refills: 1 | Status: SHIPPED | OUTPATIENT
Start: 2020-11-22

## 2020-11-22 RX ADMIN — FUROSEMIDE 40 MG: 10 INJECTION, SOLUTION INTRAMUSCULAR; INTRAVENOUS at 10:21

## 2020-11-22 NOTE — PROGRESS NOTES
Cardiology Consult Note    CC: Dyspnea/CP   WMI:Arie MIGUEL MD  Reason for consult:  bradycardia  Requesting MD:  Dr. Deanne Tompkins. Admit Date: 11/22/2020   Today's Date: 11/22/2020   Cardiologist:  Dr Lewis/Joseph. Cardiac Assessment/Plan:   1. Persistent AFib. Unsuccessful JOHN 7/2019. DCCV 8/2019. Back in AF. 2.  HTN  3. MORSE: Negative PET stress 7/2019 w/ EF 59%. Presenting to ER with atypical CP (resolved); HTN and low HR (afib w/ VR 40s: ASx). Rec: stop metoprolol (25); increase ACEi for BP control. Cont eliquis & Flec. Will set up holter. OV pending 12/7/20 with Dr Rancho Neff.    ________________________________________________  The patient reports no exertional CP nor MORSE; pleuritic/musculoskeletal CP has resolved. BP is high. No PND, orthopnea, palpitations, pre-syncope, syncope, peripheral edema, or decrease in exercise tolerance. No current complaints. ECG: afib w VR 40s. Tele: afib  CXR: no CHF/PNA. Notable labs: Neg trop; nl CMP.  __________________________________________  Notable prior cardiac history:  @ NP OV 7/29/20:   7/20: Still with mild MORSE, but energy is better s/p DCCV. Admits deconditioning may be playing a role. No palpitations. No CP, edema or syncope. She has been compliant with meds as prescribed. 1/20: Feeling slightly better since increasing Flecainide to 150mg BID. MORSE now only intermittent and happens 1-2 times per week. No CP, palpitations, edema or syncope. 12/19: Follow up after DCCV back in August. Has brief, sharp and stabbing chest pains 3-4 times per month. Also has persistent MORSE. Planning to see a nutritionist. Back in AF.  7/19:  Attempted JOHN but couldn't pass probe; pt kept pulling it out. Missed eliquis a couple of days ago. Wants a tattoo. 7/19:  Two wks ago felt dizzy, LH, SOB, fatigued, and had no energy. Normally a very active person but has not been for a few wks. Went to the ER and found to be in AFib.   Workup was unrevealing including a CTA which was neg for a PE. Started on Eliquis. No CP, orthopnea, PND, LACHELLE. Problem List:  1. Persistent AFib. Unsuccessful JOHN 7/19. PET stress 7/19 w/ EF 59% and no i/i. DCCV 8/2019. Back in AF. 2.  HTN  3. Former smoker, Q '97    , 2 kids, no e/t/d, works part time @ [employer]    IMPRESSION AND PLAN  01. Persistent atrial fibrillation (I48.19):  PET with EF 56%, no CAD suggested. 5/2020 echo with EF 55-60%. Mod LAE. Mild ROCIO.  cLVH. Mild MR. Mild TR.  RVSP 41mmHg. NPSG pending. Per previous note from Dr. Dalene Essex, \"Trying to decide on doing a catheter ablation with RF versus Hybrid MAZE for the invasive options, versus changing to a different antiarrhythmic drug like dofetilide or amiodarone. \"     Recurrent AF despite DCCV and regular compliance with Flecainide. Symptoms are improved. Will review with Dr. Dalene Essex to see if he would like to trial alternative antiarrhythmic and plan on ROV in three months. ECG done today. 02. Shortness of breath (R06.02):  Multifactorial but AFib likely a big contributor. Improved overall. 03. Abnormal EKG (R94.31):  PET stress negative for ischemia.   04. Essential (primary) hypertension (I10):  Reasonably well controlled. 05. Body mass index (BMI) 50.0-59.9, adult (W08.20): The patient was instructed on AHA diet and regular exercise. ORDERS:  1 ECG done    2 Return office visit with Pascual Dasilva MD in 3 Months. 3 The patient was instructed on AHA diet and regular exercise.         7/29/20 MEDICATION LIST  Medication Sig Desc Non-VCS   benazepril 20 mg tablet take 1 tablet by oral route  every day Y   bupropion HCl  mg 24 hr tablet, extended release take 1 tablet by oral route  every day Y   cyclobenzaprine 5 mg tablet take 1 tablet by oral route 3 times every day Y   Eliquis 5 mg tablet take 1 tablet by oral route 2 times every day N   flecainide 150 mg tablet take 1 tablet by oral route  every 12 hours N   metoprolol succinate ER 25 mg tablet,extended release 24 hr take 0.5 tablet by oral route  every day N   omeprazole 20 mg tablet,delayed release take 1 tablet by oral route  every day Y   oxybutynin chloride ER 10 mg tablet,extended release 24 hr take 1 tablet by oral route  every day Y   Vitamin D2 50,000 unit capsule take 1 capsule by oral route  every week Y   Xanax 0.25 mg tablet take 1 tablet by oral route  every day Y         ________________________________________  CARDIAC HISTORY  RISK FACTORS:  1 Former Tobacco Use   2 Hypertension       CARDIOVASCULAR PROCEDURES  Procedure Date Results   Cardiac PET 2019 Normal, REST EF 59%  STRESS EF 64%   Echo 2020 EF range 55%-60%, Left ventricular cavity size and function appears to be normal. Ejection fraction is estimated to be 55-60%. Mod LAE. Mild ROCIO.  cLVH. Mild MR. Mild TR.  RVSP 41mmHg. EKG 2020 Atrial Fib, HR 62       ACTIVE ALLERGIES:  Ingredient Reaction Comment   TETRACYCLINE           PAST MEDICAL/SURGICAL HISTORY  (Detailed)    Disease/disorder Onset Date Surgical History Date Comments   Anxiety       Arthritis       Cardiac Arrythmia       Hypertension         Family History  (Detailed)  Relationship Family Member Name  Age at Death Condition Onset Age Cause of Death   Father    CHF     Father    Diabetes     Father    High Blood Pressure     Father    Heart Attack     Mother    High Blood Pressure       SOCIAL HISTORY  (Detailed)  Preferred language is English. EDUCATION/EMPLOYMENT/OCCUPATION  Employment History Status Retired Restrictions   [employer]  full-time       Smoking status: Former smoker. SMOKING STATUS  Use Status Type Smoking Status Usage Per Day Years Used Total Pack Years   yes  Former smoker            ALCOHOL  There is a history of alcohol use. consumed rarely. CAFFEINE  The patient uses caffeine. LIFESTYLE  Never exercises.           __________________________________________________  PAST MEDICAL/SURGICAL HISTORY  (Detailed)    Disease/disorder Onset Date Surgical History Date Comments     Cardiac Pacemaker       Right Knee Surgery       Right Shoulder Surgery     Cardiac Arrythmia       Coranaryrtery Embolism       DVT       GERD       Hypercholesterolemia       Hypertension       Kidney Stones       Pulmonary Embolus       Seizures Epilepsy         OBSTETRIC HISTORY:  Pregnancy not pertinent. Family History  (Detailed)  Relationship Family Member Name  Age at Death Condition Onset Age Cause of Death   Brother    High Blood Pressure  N   Brother  N  Myocardial Infarction  N   Brother    Heart Attack     Father    High Blood Pressure  N   Father    Stroke  N   Mother    Stroke  N   Mother    High Blood Pressure  N   Mother    Heart Attack       SOCIAL HISTORY  (Detailed)  Tobacco use reviewed. Preferred language is [place]. MARITAL STATUS/FAMILY/SOCIAL SUPPORT  Currently . 0 son(s). 0 daughter(s). Smoking status: Never smoker. ALCOHOL  There is a history of alcohol use. consumed daily. CAFFEINE  The patient uses caffeine. LIFESTYLE  Exercises occasionally.       ______________________________________________________________________  Patient Active Problem List    Diagnosis Date Noted    Osteopenia     Obesity, morbid ([facility] [address] 2017    Obesity 2016    Anxiety 2016    History of bilateral knee replacement 2016    GERD (gastroesophageal reflux disease) 2016    Stress incontinence 2016    Varicose veins of both lower extremities 2016    Essential hypertension 2016        Past Medical History:   Diagnosis Date    A-fib ([facility] [address]     Arthritis     Hypertension     Osteopenia       Past Surgical History:   Procedure Laterality Date    HX CHOLECYSTECTOMY      HX HEENT      HX ORTHOPAEDIC      Bilateral Knee Replacement     Allergies   Allergen Reactions    Latex Rash    Adhesive Rash    Raspberry Rash    Tetracycline Hives      Family History   Problem Relation Age of Onset    Cancer Mother         pancreatic cancer    Cancer Brother       Social History     Socioeconomic History    Marital status:      Spouse name: Not on file    Number of children: Not on file    Years of education: Not on file    Highest education level: Not on file   Occupational History    Not on file   Social Needs    Financial resource strain: Not on file    Food insecurity     Worry: Not on file     Inability: Not on file    Transportation needs     Medical: Not on file     Non-medical: Not on file   Tobacco Use    Smoking status: Former Smoker     Years: 20.00     Last attempt to quit: 1997     Years since quittin.3    Smokeless tobacco: Never Used   Substance and Sexual Activity    Alcohol use: No     Alcohol/week: 0.0 standard drinks    Drug use: No    Sexual activity: Yes     Partners: Female     Birth control/protection: None   Lifestyle    Physical activity     Days per week: Not on file     Minutes per session: Not on file    Stress: Not on file   Relationships    Social connections     Talks on phone: Not on file     Gets together: Not on file     Attends Orthodoxy service: Not on file     Active member of club or organization: Not on file     Attends meetings of clubs or organizations: Not on file     Relationship status: Not on file    Intimate partner violence     Fear of current or ex partner: Not on file     Emotionally abused: Not on file     Physically abused: Not on file     Forced sexual activity: Not on file   Other Topics Concern    Not on file   Social History Narrative    Not on file     No current facility-administered medications for this encounter.       Current Outpatient Medications   Medication Sig    benazepriL (LOTENSIN) 20 mg tablet Take 2 tabs daily    omeprazole (PRILOSEC) 20 mg capsule Take 1 capsule by mouth once daily    Eliquis 5 mg tablet Take 1 tablet by mouth twice daily    Vitamin D2 1,250 mcg (50,000 unit) capsule Take 1 capsule by mouth once a week    ALPRAZolam (XANAX) 0.25 mg tablet TAKE 1 TABLET BY MOUTH ONCE DAILY AS NEEDED FOR ANXIETY    cyclobenzaprine (FLEXERIL) 5 mg tablet TAKE 1 TABLET BY MOUTH THREE TIMES DAILY AS NEEDED FOR MUSCLE SPASM    flecainide (TAMBOCOR) 100 mg tablet Take 1 Tab by mouth two (2) times a day. (Patient taking differently: Take 150 mg by mouth two (2) times a day.)    varicella-zoster recombinant, PF, (SHINGRIX, PF,) 50 mcg/0.5 mL susr injection 0.5mL by IntraMUSCular route once now and then repeat in 2-6 months    oxybutynin chloride XL (DITROPAN XL) 10 mg CR tablet TAKE 1 TABLET BY MOUTH ONCE DAILY    B.infantis-B.ani-B.long-B.bifi (PROBIOTIC 4X) 10-15 mg TbEC Take  by mouth daily. Indications: Pt takes 1 probiotic tablet daily    buPROPion XL (WELLBUTRIN XL) 150 mg tablet TAKE 1 TABLET BY MOUTH ONCE DAILY IN THE MORNING    furosemide (LASIX) 20 mg tablet Take 1 Tab by mouth daily. No reported FHx of early CAD or SCD    Prior to Admission Medications:  Prior to Admission medications    Medication Sig Start Date End Date Taking? Authorizing Provider   benazepriL (LOTENSIN) 20 mg tablet Take 2 tabs daily 11/22/20  Yes Jennifer Mejía MD   omeprazole (PRILOSEC) 20 mg capsule Take 1 capsule by mouth once daily 6/23/20   AppAmbar Obrien MD   Eliquis 5 mg tablet Take 1 tablet by mouth twice daily 6/17/20   Apppilar Seaman MD   Vitamin D2 1,250 mcg (50,000 unit) capsule Take 1 capsule by mouth once a week 3/14/20   Appa Lakshmi Gonzales MD   ALPRAZolam (XANAX) 0.25 mg tablet TAKE 1 TABLET BY MOUTH ONCE DAILY AS NEEDED FOR ANXIETY 11/19/19   Apppilar Seaman MD   cyclobenzaprine (FLEXERIL) 5 mg tablet TAKE 1 TABLET BY MOUTH THREE TIMES DAILY AS NEEDED FOR MUSCLE SPASM 9/13/19   Appa Dede Seaman MD   flecainide (TAMBOCOR) 100 mg tablet Take 1 Tab by mouth two (2) times a day.   Patient taking differently: Take 150 mg by mouth two (2) times a day. 19   Abebe Lewis III, DO   varicella-zoster recombinant, PF, (SHINGRIX, PF,) 50 mcg/0.5 mL susr injection 0.5mL by IntraMUSCular route once now and then repeat in 2-6 months 19   Rai Loco MD   oxybutynin chloride XL (DITROPAN XL) 10 mg CR tablet TAKE 1 TABLET BY MOUTH ONCE DAILY 7/15/19   Apppilar Loco MD   B.infantis-B.ani-B.long-B.bifi (PROBIOTIC 4X) 10-15 mg TbEC Take  by mouth daily. Indications: Pt takes 1 probiotic tablet daily    Provider, Historical   buPROPion XL (WELLBUTRIN XL) 150 mg tablet TAKE 1 TABLET BY MOUTH ONCE DAILY IN THE MORNING 19   Ambar Madera MD   furosemide (LASIX) 20 mg tablet Take 1 Tab by mouth daily. 17   Joel Arenas MD        Review of Symptoms: Except as noted in HPI, patient denies recent fever or chills, nausea, vomiting, diarrhea, hemoptysis, hematemesis, dysuria, myalgias, focal neurologic symptoms, ecchymosis, angioedema, odynophagia, dysphagia, sore throat, earache,rash, melena, hematochezia, depression, GERD, cold intolerance, petechia, bleeding gums, or significant weight loss. 24 hr VS reviewed, overall VSSAF  Temp (24hrs), Av.1 °F (36.7 °C), Min:98.1 °F (36.7 °C), Max:98.1 °F (36.7 °C)    Patient Vitals for the past 8 hrs:   Pulse   20 1225 76   20 1045 (!) 43   20 1021 (!) 44   20 0830 (!) 46     Patient Vitals for the past 8 hrs:   Resp   20 1225 18   20 1045 20   20 0830 22     Patient Vitals for the past 8 hrs:   BP   20 1045 (!) 135/92   20 1021 (!) 193/77   20 0830 (!) 142/97     No intake or output data in the 24 hours ending 20 1230      Physical Exam (complete single organ system exam)    Cons: The patient is no distress. Appears stated age. HEENT: Normal conjunctivae and palate. No xanthelasma. Neck: Flat JVP without appreciable HJR. Resp: Normal respiratory effort with clear lungs bilaterally. CV: Regular rate and rhythm. PMI not palpated. Normal S1,S2  No gallop or rubs appreciated. No murmur appreciated. Intact carotid upstroke bilaterally without appreciated bruits. Abdominal aorta not palpated; no abdominal bruit noted. Intact pedal pulses. No peripheral edema. GI: No abd mass noted, soft; no organomegaly noted. Bowel sounds present. Muscular:  No significant kyphosis. Strength WNL for age. Ext: No cyanosis, clubbing, or stigmata of peripheral embolization. Derm: No ulcers or stasis dermatitis of lower extremities. Neuro: Alert and oriented x 3;  Grossly non-focal. Normal mood and affect. Labs:   Recent Results (from the past 24 hour(s))   CBC WITH AUTOMATED DIFF    Collection Time: 11/22/20  8:34 AM   Result Value Ref Range    WBC 6.2 3.6 - 11.0 K/uL    RBC 4.75 3.80 - 5.20 M/uL    HGB 13.4 11.5 - 16.0 g/dL    HCT 41.1 35.0 - 47.0 %    MCV 86.5 80.0 - 99.0 FL    MCH 28.2 26.0 - 34.0 PG    MCHC 32.6 30.0 - 36.5 g/dL    RDW 15.5 (H) 11.5 - 14.5 %    PLATELET 752 855 - 197 K/uL    MPV 12.1 8.9 - 12.9 FL    NRBC 0.0 0  WBC    ABSOLUTE NRBC 0.00 0.00 - 0.01 K/uL    NEUTROPHILS 67 32 - 75 %    LYMPHOCYTES 20 12 - 49 %    MONOCYTES 10 5 - 13 %    EOSINOPHILS 2 0 - 7 %    BASOPHILS 1 0 - 1 %    IMMATURE GRANULOCYTES 0 0.0 - 0.5 %    ABS. NEUTROPHILS 4.2 1.8 - 8.0 K/UL    ABS. LYMPHOCYTES 1.3 0.8 - 3.5 K/UL    ABS. MONOCYTES 0.6 0.0 - 1.0 K/UL    ABS. EOSINOPHILS 0.1 0.0 - 0.4 K/UL    ABS. BASOPHILS 0.1 0.0 - 0.1 K/UL    ABS. IMM.  GRANS. 0.0 0.00 - 0.04 K/UL    DF AUTOMATED     METABOLIC PANEL, COMPREHENSIVE    Collection Time: 11/22/20  8:34 AM   Result Value Ref Range    Sodium 140 136 - 145 mmol/L    Potassium 4.1 3.5 - 5.1 mmol/L    Chloride 110 (H) 97 - 108 mmol/L    CO2 25 21 - 32 mmol/L    Anion gap 5 5 - 15 mmol/L    Glucose 120 (H) 65 - 100 mg/dL    BUN 17 6 - 20 MG/DL    Creatinine 0.87 0.55 - 1.02 MG/DL    BUN/Creatinine ratio 20 12 - 20      GFR est AA >60 >60 ml/min/1.73m2    GFR est non-AA >60 >60 ml/min/1.73m2    Calcium 8.8 8.5 - 10.1 MG/DL    Bilirubin, total 0.6 0.2 - 1.0 MG/DL    ALT (SGPT) 75 12 - 78 U/L    AST (SGOT) 59 (H) 15 - 37 U/L    Alk. phosphatase 112 45 - 117 U/L    Protein, total 6.7 6.4 - 8.2 g/dL    Albumin 3.2 (L) 3.5 - 5.0 g/dL    Globulin 3.5 2.0 - 4.0 g/dL    A-G Ratio 0.9 (L) 1.1 - 2.2     CK W/ REFLX CKMB    Collection Time: 11/22/20  8:34 AM   Result Value Ref Range    CK 55 26 - 192 U/L   TROPONIN I    Collection Time: 11/22/20  8:34 AM   Result Value Ref Range    Troponin-I, Qt. <0.05 <0.05 ng/mL   MAGNESIUM    Collection Time: 11/22/20  8:34 AM   Result Value Ref Range    Magnesium 2.0 1.6 - 2.4 mg/dL   PHOSPHORUS    Collection Time: 11/22/20  8:34 AM   Result Value Ref Range    Phosphorus 3.0 2.6 - 4.7 MG/DL   SAMPLES BEING HELD    Collection Time: 11/22/20  8:34 AM   Result Value Ref Range    SAMPLES BEING HELD BLUE TUBE     COMMENT        Add-on orders for these samples will be processed based on acceptable specimen integrity and analyte stability, which may vary by analyte.    NT-PRO BNP    Collection Time: 11/22/20  8:34 AM   Result Value Ref Range    NT pro-BNP 2,074 (H) <125 PG/ML   EKG, 12 LEAD, INITIAL    Collection Time: 11/22/20  8:38 AM   Result Value Ref Range    Ventricular Rate 48 BPM    Atrial Rate 38 BPM    QRS Duration 102 ms    Q-T Interval 486 ms    QTC Calculation (Bezet) 434 ms    Calculated R Axis -42 degrees    Calculated T Axis 9 degrees    Diagnosis       Junctional rhythm  Left axis deviation  Septal infarct (cited on or before 22-NOV-2020)  Inferior infarct , age undetermined  When compared with ECG of 01-JUN-2020 10:43,  Junctional rhythm has replaced Sinus rhythm  Nonspecific T wave abnormality, improved in Anterolateral leads       Recent Labs     11/22/20  0834   TROIQ <0.05       Recent Labs     11/22/20  0834      K 4.1   *   CO2 25   BUN 17   CREA 0.87   GFRAA >60   *   PHOS 3.0   CA 8.8 ALB 3.2*   WBC 6.2   HGB 13.4   HCT 41.1          Alfreda Al MD

## 2020-11-22 NOTE — DISCHARGE INSTRUCTIONS
You were seen in the ER for your symptoms. Please follow-up with your primary care doctor and cardiologist.  Please stop taking your metoprolol and increase your Benzapril to 40 mg daily. Please return for worsening symptoms at any time including continued shortness of breath, chest pain or weakness.

## 2020-11-22 NOTE — ED PROVIDER NOTES
EMERGENCY DEPARTMENT HISTORY AND PHYSICAL EXAM      Date: 11/22/2020  Patient Name: Walter Lee    History of Presenting Illness     Chief Complaint   Patient presents with    Shortness of Breath     Pt c/o SOB, and chest pressure when taking a deep breath and coughing. Pt also c/o HA, and tinlging all over body x 3 days ago. Pt states she has a hx of AFIB    Chest Pain       History Provided By: Patient    HPI: Walter Lee, 79 y.o. female presents to the ED with cc of shortness of breath and chest pain. Patient has a history of atrial fibrillation on flecainide and metoprolol, anticoagulated on Eliquis, GERD who presents emergency department with 2 days of shortness of breath and dyspnea on exertion. Patient reports symptoms were preceded with an episode of chest pain and arm tingling. Patient reports she continues to have chest pain at rest associated with shortness of breath. Patient presents emergency department today due to increasing dyspnea at night. She endorses orthopnea, but no PND. She denies any history of lung disease. She is on Lasix, but only takes as needed. Denies any weight gain. Denies any leg swelling. Denies any fevers or chills. Did report some mild chest congestion and sinus congestion for which she took Mucinex. Patient is otherwise been compliant with her medications. There are no other complaints, changes, or physical findings at this time. PCP: Elena Rose MD    No current facility-administered medications on file prior to encounter.       Current Outpatient Medications on File Prior to Encounter   Medication Sig Dispense Refill    omeprazole (PRILOSEC) 20 mg capsule Take 1 capsule by mouth once daily 90 Cap 0    Eliquis 5 mg tablet Take 1 tablet by mouth twice daily 60 Tab 0    Vitamin D2 1,250 mcg (50,000 unit) capsule Take 1 capsule by mouth once a week 12 Cap 0    [DISCONTINUED] benazepriL (LOTENSIN) 10 mg tablet TAKE 1 TABLET BY MOUTH ONCE DAILY 90 Tab 0    ALPRAZolam (XANAX) 0.25 mg tablet TAKE 1 TABLET BY MOUTH ONCE DAILY AS NEEDED FOR ANXIETY 15 Tab 0    [DISCONTINUED] benazepril (LOTENSIN) 20 mg tablet TAKE 1 TABLET BY MOUTH ONCE DAILY 90 Tab 1    cyclobenzaprine (FLEXERIL) 5 mg tablet TAKE 1 TABLET BY MOUTH THREE TIMES DAILY AS NEEDED FOR MUSCLE SPASM 30 Tab 1    flecainide (TAMBOCOR) 100 mg tablet Take 1 Tab by mouth two (2) times a day. (Patient taking differently: Take 150 mg by mouth two (2) times a day.) 60 Tab 11    varicella-zoster recombinant, PF, (SHINGRIX, PF,) 50 mcg/0.5 mL susr injection 0.5mL by IntraMUSCular route once now and then repeat in 2-6 months 0.5 mL 1    [DISCONTINUED] metoprolol succinate (TOPROL-XL) 25 mg XL tablet Take 1 Tab by mouth daily. (Patient taking differently: Take 25 mg by mouth daily. 1/2 tablet) 30 Tab 5    oxybutynin chloride XL (DITROPAN XL) 10 mg CR tablet TAKE 1 TABLET BY MOUTH ONCE DAILY 30 Tab 3    B.infantis-B.ani-B.long-B.bifi (PROBIOTIC 4X) 10-15 mg TbEC Take  by mouth daily. Indications: Pt takes 1 probiotic tablet daily      buPROPion XL (WELLBUTRIN XL) 150 mg tablet TAKE 1 TABLET BY MOUTH ONCE DAILY IN THE MORNING 30 Tab 5    furosemide (LASIX) 20 mg tablet Take 1 Tab by mouth daily.  30 Tab 5       Past History     Past Medical History:  Past Medical History:   Diagnosis Date    A-fib ([facility] [address]     Arthritis     Hypertension     Osteopenia        Past Surgical History:  Past Surgical History:   Procedure Laterality Date    HX CHOLECYSTECTOMY      HX HEENT      HX ORTHOPAEDIC      Bilateral Knee Replacement       Family History:  Family History   Problem Relation Age of Onset    Cancer Mother         pancreatic cancer    Cancer Brother        Social History:  Social History     Tobacco Use    Smoking status: Former Smoker     Years: 20.00     Last attempt to quit: 1997     Years since quittin.3    Smokeless tobacco: Never Used   Substance Use Topics    Alcohol use: No     Alcohol/week: 0.0 standard drinks    Drug use: No       Allergies: Allergies   Allergen Reactions    Latex Rash    Adhesive Rash    Raspberry Rash    Tetracycline Hives         Review of Systems   Review of Systems   Constitutional: Negative for activity change, chills and fever. HENT: Positive for sinus pressure. Negative for facial swelling and voice change. Eyes: Negative for redness. Respiratory: Positive for shortness of breath. Negative for cough and wheezing. Cardiovascular: Positive for chest pain. Negative for leg swelling. Gastrointestinal: Negative for abdominal pain, diarrhea, nausea and vomiting. Genitourinary: Negative for decreased urine volume. Musculoskeletal: Negative for gait problem. Skin: Negative for pallor and rash. Neurological: Negative for tremors and facial asymmetry. Psychiatric/Behavioral: Negative for agitation. All other systems reviewed and are negative. Physical Exam   Physical Exam  Vitals signs and nursing note reviewed. Constitutional:       Comments: 27-year-old female, resting comfortably on stretcher, no distress   HENT:      Head: Normocephalic and atraumatic. Neck:      Musculoskeletal: Normal range of motion. Cardiovascular:      Rate and Rhythm: Bradycardia present. Rhythm irregular. Heart sounds: No murmur. No friction rub. No gallop. Pulmonary:      Effort: Pulmonary effort is normal.      Breath sounds: Normal breath sounds. Comments: Saturating 100% on room air  Abdominal:      Palpations: Abdomen is soft. Tenderness: There is no abdominal tenderness. Musculoskeletal: Normal range of motion. Right lower leg: No edema. Left lower leg: No edema. Skin:     General: Skin is warm. Capillary Refill: Capillary refill takes less than 2 seconds. Neurological:      General: No focal deficit present. Mental Status: She is alert.    Psychiatric:         Mood and Affect: Mood normal. Diagnostic Study Results     Labs -     Recent Results (from the past 12 hour(s))   CBC WITH AUTOMATED DIFF    Collection Time: 11/22/20  8:34 AM   Result Value Ref Range    WBC 6.2 3.6 - 11.0 K/uL    RBC 4.75 3.80 - 5.20 M/uL    HGB 13.4 11.5 - 16.0 g/dL    HCT 41.1 35.0 - 47.0 %    MCV 86.5 80.0 - 99.0 FL    MCH 28.2 26.0 - 34.0 PG    MCHC 32.6 30.0 - 36.5 g/dL    RDW 15.5 (H) 11.5 - 14.5 %    PLATELET 537 063 - 022 K/uL    MPV 12.1 8.9 - 12.9 FL    NRBC 0.0 0  WBC    ABSOLUTE NRBC 0.00 0.00 - 0.01 K/uL    NEUTROPHILS 67 32 - 75 %    LYMPHOCYTES 20 12 - 49 %    MONOCYTES 10 5 - 13 %    EOSINOPHILS 2 0 - 7 %    BASOPHILS 1 0 - 1 %    IMMATURE GRANULOCYTES 0 0.0 - 0.5 %    ABS. NEUTROPHILS 4.2 1.8 - 8.0 K/UL    ABS. LYMPHOCYTES 1.3 0.8 - 3.5 K/UL    ABS. MONOCYTES 0.6 0.0 - 1.0 K/UL    ABS. EOSINOPHILS 0.1 0.0 - 0.4 K/UL    ABS. BASOPHILS 0.1 0.0 - 0.1 K/UL    ABS. IMM. GRANS. 0.0 0.00 - 0.04 K/UL    DF AUTOMATED     METABOLIC PANEL, COMPREHENSIVE    Collection Time: 11/22/20  8:34 AM   Result Value Ref Range    Sodium 140 136 - 145 mmol/L    Potassium 4.1 3.5 - 5.1 mmol/L    Chloride 110 (H) 97 - 108 mmol/L    CO2 25 21 - 32 mmol/L    Anion gap 5 5 - 15 mmol/L    Glucose 120 (H) 65 - 100 mg/dL    BUN 17 6 - 20 MG/DL    Creatinine 0.87 0.55 - 1.02 MG/DL    BUN/Creatinine ratio 20 12 - 20      GFR est AA >60 >60 ml/min/1.73m2    GFR est non-AA >60 >60 ml/min/1.73m2    Calcium 8.8 8.5 - 10.1 MG/DL    Bilirubin, total 0.6 0.2 - 1.0 MG/DL    ALT (SGPT) 75 12 - 78 U/L    AST (SGOT) 59 (H) 15 - 37 U/L    Alk.  phosphatase 112 45 - 117 U/L    Protein, total 6.7 6.4 - 8.2 g/dL    Albumin 3.2 (L) 3.5 - 5.0 g/dL    Globulin 3.5 2.0 - 4.0 g/dL    A-G Ratio 0.9 (L) 1.1 - 2.2     CK W/ REFLX CKMB    Collection Time: 11/22/20  8:34 AM   Result Value Ref Range    CK 55 26 - 192 U/L   TROPONIN I    Collection Time: 11/22/20  8:34 AM   Result Value Ref Range    Troponin-I, Qt. <0.05 <0.05 ng/mL   MAGNESIUM Collection Time: 11/22/20  8:34 AM   Result Value Ref Range    Magnesium 2.0 1.6 - 2.4 mg/dL   PHOSPHORUS    Collection Time: 11/22/20  8:34 AM   Result Value Ref Range    Phosphorus 3.0 2.6 - 4.7 MG/DL   SAMPLES BEING HELD    Collection Time: 11/22/20  8:34 AM   Result Value Ref Range    SAMPLES BEING HELD BLUE TUBE     COMMENT        Add-on orders for these samples will be processed based on acceptable specimen integrity and analyte stability, which may vary by analyte. NT-PRO BNP    Collection Time: 11/22/20  8:34 AM   Result Value Ref Range    NT pro-BNP 2,074 (H) <125 PG/ML   EKG, 12 LEAD, INITIAL    Collection Time: 11/22/20  8:38 AM   Result Value Ref Range    Ventricular Rate 48 BPM    Atrial Rate 38 BPM    QRS Duration 102 ms    Q-T Interval 486 ms    QTC Calculation (Bezet) 434 ms    Calculated R Axis -42 degrees    Calculated T Axis 9 degrees    Diagnosis       Atrial fibrillation with slow ventricular response  Left axis deviation  Loss of anteroseptal forces  Cannot rule out Inferior infarct , age undetermined  When compared with ECG of 01-JUN-2020 10:43,  Atrial fibrillation has replaced Sinus rhythm  Confirmed by Indira Doe ([id number]) on 11/22/2020 3:12:26 PM         Radiologic Studies -   XR CHEST PORT   Final Result   IMPRESSION:   No acute process. CT Results  (Last 48 hours)    None        CXR Results  (Last 48 hours)               11/22/20 0850  XR CHEST PORT Final result    Impression:  IMPRESSION:   No acute process. Narrative:  INDICATION: Shortness of breath       EXAM:  AP CHEST RADIOGRAPH       COMPARISON: 6/19/19       FINDINGS:       AP portable view of the chest demonstrates cardiomegaly. There is no edema,   effusion, consolidation, or pneumothorax. The osseous structures are   unremarkable. Medical Decision Making   I am the first provider for this patient.     I reviewed the vital signs, available nursing notes, past medical history, past surgical history, family history and social history. Vital Signs-Reviewed the patient's vital signs. Patient Vitals for the past 12 hrs:   Temp Pulse Resp BP SpO2   11/22/20 1225  76 18  98 %   11/22/20 1045  (!) 43 20 (!) 135/92 97 %   11/22/20 1021  (!) 44  (!) 193/77    11/22/20 0843     99 %   11/22/20 0830 98.1 °F (36.7 °C) (!) 46 22 (!) 142/97 98 %       EKG interpretation: (Preliminary)    EKG interpreted by me. Shows atrial fibrillation with a HR of 48. No ST elevations or depressions concerning for ischemia. Normal intervals. Records Reviewed: Nursing Notes, Old Medical Records and Previous electrocardiograms    Provider Notes (Medical Decision Making):     80-year-old female with paroxysmal atrial fibrillation presents emergency department with shortness of breath and chest pain. Chest pain is atypical for ACS, EKG is nonischemic, will check 1 troponin. Check BNP given shortness of breath. Lungs are clear. Doubt PE given anticoagulation. Check chest x-ray for CHF. Patient's EKG however does show atrial fibrillation with bradycardia, on the monitor her heart rate will be as low as 35. Question whether this is contributing patient's shortness of breath as well as chest pain. This may be a result of metoprolol or flecainide. Will check electrolytes. Observe on telemetry in ED. Discuss with cardiology. ED Course:   Initial assessment performed. The patients presenting problems have been discussed, and they are in agreement with the care plan formulated and outlined with them. I have encouraged them to ask questions as they arise throughout their visit. ED Course as of Nov 22 1621   Sun Nov 22, 2020   1000 Labs are unremarkable denies increased BNP. Will give Lasix discussed with cardiology. [MB]   1006 Chest x-ray unremarkable. Patient's heart rate remains in the 30s and 40s.     [MB]   80 Discussed with Dr. Nick Arnold with cardiology, recommends ambulating patient the ED, if her heart rate responds appropriately, she can likely be discharged to hold her metoprolol and increase benazepril. [MB]   2604 After discussion with Dr. Hyacinth Brambila, given patient's improved heart rate with exertion, patient does not need admission. Patient family updated, they are comfortable with this plan. Informed to stop metoprolol and increase Benzapril. Continue flecainide and Lasix and anticoagulation. Strict return precautions provided. Patient's heart rate in 60s with movement in bed. She is appropriate for discharge. Diuresing appropriately with Lasix. [MB]      ED Course User Index  [MB] MD Pamela Bowen MD      Disposition:    Reassessments as above. Labs and ED course reviewed. Patient was discharged home and was provided strict return precautions. Patient to follow-up with PCP or specialist per discharge instructions or return to ED for worsening symptoms. DISCHARGE PLAN:  1.    Discharge Medication List as of 11/22/2020 11:57 AM      CONTINUE these medications which have CHANGED    Details   benazepriL (LOTENSIN) 20 mg tablet Take 2 tabs daily, Normal, Disp-90 Tab,R-1         CONTINUE these medications which have NOT CHANGED    Details   omeprazole (PRILOSEC) 20 mg capsule Take 1 capsule by mouth once daily, Normal, Disp-90 Cap,R-0      Eliquis 5 mg tablet Take 1 tablet by mouth twice daily, Normal, Disp-60 Tab,R-0      Vitamin D2 1,250 mcg (50,000 unit) capsule Take 1 capsule by mouth once a week, Normal, Disp-12 Cap, R-0      ALPRAZolam (XANAX) 0.25 mg tablet TAKE 1 TABLET BY MOUTH ONCE DAILY AS NEEDED FOR ANXIETY, Normal, Disp-15 Tab, R-0      cyclobenzaprine (FLEXERIL) 5 mg tablet TAKE 1 TABLET BY MOUTH THREE TIMES DAILY AS NEEDED FOR MUSCLE SPASM, NormalPlease consider 90 day supplies to promote better adherenceDisp-30 Tab, R-1      flecainide (TAMBOCOR) 100 mg tablet Take 1 Tab by mouth two (2) times a day., Normal, Disp-60 Tab, R-11      varicella-zoster recombinant, PF, (SHINGRIX, PF,) 50 mcg/0.5 mL susr injection 0.5mL by IntraMUSCular route once now and then repeat in 2-6 months, Normal, Disp-0.5 mL, R-1      oxybutynin chloride XL (DITROPAN XL) 10 mg CR tablet TAKE 1 TABLET BY MOUTH ONCE DAILY, NormalPlease consider 90 day supplies to promote better adherenceDisp-30 Tab, R-3      B.infantis-B.ani-B.long-B.bifi (PROBIOTIC 4X) 10-15 mg TbEC Take  by mouth daily. Indications: Pt takes 1 probiotic tablet daily, Historical Med      buPROPion XL (WELLBUTRIN XL) 150 mg tablet TAKE 1 TABLET BY MOUTH ONCE DAILY IN THE MORNING, NormalPlease consider 90 day supplies to promote better adherenceDisp-30 Tab, R-5      furosemide (LASIX) 20 mg tablet Take 1 Tab by mouth daily. , Normal, Disp-30 Tab, R-5         STOP taking these medications       metoprolol succinate (TOPROL-XL) 25 mg XL tablet Comments:   Reason for Stoppin.   Follow-up Information     Follow up With Specialties Details Why Contact Info    Arely Covarrubias MD Internal Medicine In 2 days  [address]  [address] [id number] [phone]      Erika Luu MD Cardiology In 2 days call for appointment [phone] [facility]  [address]  [address] [phone]          3. Return to ED if worse     Diagnosis     Clinical Impression:   1. Atrial fibrillation, unspecified type ([address]    2. SOB (shortness of breath)    3. Bradycardia    4. Essential hypertension        Attestations:    Chintan Mistry MD    Please note that this dictation was completed with [employer], the [employer] voice recognition software. Quite often unanticipated grammatical, syntax, homophones, and other interpretive errors are inadvertently transcribed by the computer software. Please disregard these errors. Please excuse any errors that have escaped final proofreading. Thank you.

## 2020-11-22 NOTE — Clinical Note
[address] 
[facility] EMERGENCY DEPT 
[address] [place] [id number] [phone] Work/School Note Date: 11/22/2020 To Whom It May concern: 
 
Divina Kang was seen and treated today in the emergency room by the following provider(s): 
Attending Provider: Nicholas Hill MD. Divina Kang is excused from work/school on 11/22/2020 through 11/25/2020. She is medically clear to return to work/school on 11/26/2020.   
  
 
Sincerely, 
 
 
 
 
Jennifer Caal MD

## 2020-11-22 NOTE — ED NOTES
Dr. Anthony South at bedside at this time. Pt resting on stretcher in POC with call bell in reach. Pt remains on monitor x 3. VSS at this time. 1225: Pt discharged by Dr. Chelsea Garg. Pt provided with discharge instructions Rx and instructions on follow up care. Pt out of ED via wheelchair accompanied by [facility]. Dr. Hyacinth Brambila currently at bedside.

## 2020-11-22 NOTE — ED NOTES
Pt arrives from home reporting SOB, chest pressure/tightness, head pressure, dry cough for the last 36 hours. Pt endorses a hx of afib. Pt denies fever. Pt endorses hx of a-fib dx. Pt is afebrile at this time.      Pt endorses taking elequis daily

## 2020-12-01 DIAGNOSIS — K21.9 GASTROESOPHAGEAL REFLUX DISEASE WITHOUT ESOPHAGITIS: ICD-10-CM

## 2020-12-01 NOTE — CONSULTS
Cardiology Consult Note   CC: Dyspnea/CP YRZ:Jagjit SHAIKH MD   Reason for consult: bradycardia Requesting MD: Dr. Miguel Fleischer. Today's Date: 11/22/2020   Cardiologist: Dr Lewis/Joseph. Cardiac Assessment/Plan:    1. Persistent AFib. Unsuccessful JOHN 7/2019. DCCV 8/2019. Back in AF. 2. HTN   3. MORSE: Negative PET stress 7/2019 w/ EF 59%. Presenting to ER with atypical CP (resolved); HTN and low HR (afib w/ VR 40s: ASx). Rec: stop metoprolol (25); increase ACEi for BP control. Cont eliquis & Flec. Will set up holter. OV pending 12/7/20 with Dr Terence Quintero.   ________________________________________________   The patient reports no exertional CP nor MORSE; pleuritic/musculoskeletal CP has resolved. BP is high. No PND, orthopnea, palpitations, pre-syncope, syncope, peripheral edema, or decrease in exercise tolerance. No current complaints. ECG: afib w VR 40s. Tele: afib  CXR: no CHF/PNA. Notable labs: Neg trop; nl CMP.   __________________________________________   Notable prior cardiac history: @ NP OV 7/29/20:   7/20: Still with mild MORSE, but energy is better s/p DCCV. Admits deconditioning may be playing a role. No palpitations. No CP, edema or syncope. She has been compliant with meds as prescribed. 1/20: Feeling slightly better since increasing Flecainide to 150mg BID. MORSE now only intermittent and happens 1-2 times per week. No CP, palpitations, edema or syncope. 12/19: Follow up after DCCV back in August. Has brief, sharp and stabbing chest pains 3-4 times per month. Also has persistent MORSE. Planning to see a nutritionist. Back in AF.   7/19: Attempted JOHN but couldn't pass probe; pt kept pulling it out. Missed eliquis a couple of days ago. Wants a tattoo. 7/19: Two wks ago felt dizzy, LH, SOB, fatigued, and had no energy. Normally a very active person but has not been for a few wks. Went to the ER and found to be in AFib.  Workup was unrevealing including a CTA which was neg for a PE. Started on Eliquis. No CP, orthopnea, PND, LACHELLE. Problem List:   1. Persistent AFib. Unsuccessful JOHN 7/19. PET stress 7/19 w/ EF 59% and no i/i. DCCV 8/2019. Back in AF. 2. HTN   3. Former smoker, Q '97   , 2 kids, no e/t/d, works part time @ [employer]     IMPRESSION AND PLAN   01. Persistent atrial fibrillation (I48.19): PET with EF 56%, no CAD suggested. 5/2020 echo with EF 55-60%. Mod LAE. Mild ROCIO. cLVH. Mild MR. Mild TR. RVSP 41mmHg. NPSG pending. Per previous note from Dr. Skeet Saint, \"Trying to decide on doing a catheter ablation with RF versus Hybrid MAZE for the invasive options, versus changing to a different antiarrhythmic drug like dofetilide or amiodarone. \"   Recurrent AF despite DCCV and regular compliance with Flecainide. Symptoms are improved. Will review with Dr. Skeet Saint to see if he would like to trial alternative antiarrhythmic and plan on ROV in three months. ECG done today. 02. Shortness of breath (R06.02): Multifactorial but AFib likely a big contributor. Improved overall. 03. Abnormal EKG (R94.31): PET stress negative for ischemia.   04. Essential (primary) hypertension (I10): Reasonably well controlled. 05. Body mass index (BMI) 50.0-59.9, adult (A20.00): The patient was instructed on AHA diet and regular exercise. ORDERS:   1 ECG done    2 Return office visit with Zenaida Ferrell MD in 3 Months. 3 The patient was instructed on AHA diet and regular exercise.       7/29/20 MEDICATION LIST   Medication Sig Desc   benazepril 20 mg tablet take 1 tablet by oral route every day   bupropion HCl  mg 24 hr tablet, extended release take 1 tablet by oral route every day   cyclobenzaprine 5 mg tablet take 1 tablet by oral route 3 times every day   Eliquis 5 mg tablet take 1 tablet by oral route 2 times every day   flecainide 150 mg tablet take 1 tablet by oral route every 12 hours   metoprolol succinate ER 25 mg tablet,extended release 24 hr take 0.5 tablet by oral route every day   omeprazole 20 mg tablet,delayed release take 1 tablet by oral route every day   oxybutynin chloride ER 10 mg tablet,extended release 24 hr take 1 tablet by oral route every day   Vitamin D2 50,000 unit capsule take 1 capsule by oral route every week   Xanax 0.25 mg tablet take 1 tablet by oral route every day   ________________________________________   CARDIAC HISTORY   RISK FACTORS:   1 Former Tobacco Use   2 Hypertension     CARDIOVASCULAR PROCEDURES   Procedure Date Results   Cardiac PET 2019 Normal, REST EF 59% STRESS EF 64%   Echo 2020 EF range 55%-60%, Left ventricular cavity size and function appears to be normal. Ejection fraction is estimated to be 55-60%. Mod LAE. Mild ROCIO. cLVH. Mild MR. Mild TR. RVSP 41mmHg. EKG 2020 Atrial Fib, HR 62     ACTIVE ALLERGIES:   Ingredient Reaction Comment   TETRACYCLINE     PAST MEDICAL/SURGICAL HISTORY (Detailed)   Disease/disorder Onset Date Surgical History Date Comments   Anxiety       Arthritis       Cardiac Arrythmia       Hypertension       Family History (Detailed)   Relationship Family Member Name  Age at Death Condition Onset Age Cause of Death   Father    CHF     Father    Diabetes     Father    High Blood Pressure     Father    Heart Attack     Mother    High Blood Pressure     SOCIAL HISTORY (Detailed)   Preferred language is English. EDUCATION/EMPLOYMENT/OCCUPATION   Employment History Status Retired Restrictions   [employer]  full-time       Smoking status: Former smoker. SMOKING STATUS   Use Status Type Smoking Status Usage Per Day Years Used Total Pack Years   yes  Former smoker      ALCOHOL   There is a history of alcohol use. consumed rarely. CAFFEINE   The patient uses caffeine. LIFESTYLE   Never exercises.    __________________________________________________   PAST MEDICAL/SURGICAL HISTORY (Detailed)   Disease/disorder Onset Date Surgical History Date Comments     Cardiac Pacemaker       Right Knee Surgery       Right Shoulder Surgery     Cardiac Arrythmia       Coranaryrtery Embolism       DVT       GERD       Hypercholesterolemia       Hypertension       Kidney Stones       Pulmonary Embolus       Seizures Epilepsy       OBSTETRIC HISTORY:   Pregnancy not pertinent. Family History (Detailed)   Relationship Family Member Name  Age at Death Condition Onset Age Cause of Death   Brother    High Blood Pressure  N   Brother  N  Myocardial Infarction  N   Brother    Heart Attack     Father    High Blood Pressure  N   Father    Stroke  N   Mother    Stroke  N   Mother    High Blood Pressure  N   Mother    Heart Attack     SOCIAL HISTORY (Detailed)   Tobacco use reviewed. Preferred language is [place]. MARITAL STATUS/FAMILY/SOCIAL SUPPORT   Currently . 0 son(s). 0 daughter(s). Smoking status: Never smoker. ALCOHOL   There is a history of alcohol use. consumed daily. CAFFEINE   The patient uses caffeine. LIFESTYLE   Exercises occasionally.    ______________________________________________________________________        Patient Active Problem List    Diagnosis Date Noted    Osteopenia     Obesity, morbid ([facility] [address] 2017    Obesity 2016    Anxiety 2016    History of bilateral knee replacement 2016    GERD (gastroesophageal reflux disease) 2016    Stress incontinence 2016    Varicose veins of both lower extremities 2016    Essential hypertension 2016          Past Medical History:   Diagnosis Date    A-fib ([facility] [address]     Arthritis     Hypertension     Osteopenia            Past Surgical History:   Procedure Laterality Date    HX CHOLECYSTECTOMY      HX HEENT      HX ORTHOPAEDIC      Bilateral Knee Replacement          Allergies   Allergen Reactions    Latex Rash    Adhesive Rash    Raspberry Rash    Tetracycline Hives           Family History   Problem Relation Age of Onset    Cancer Mother     pancreatic cancer    Cancer Brother Social History           Socioeconomic History    Marital status:      Spouse name: Not on file    Number of children: Not on file    Years of education: Not on file    Highest education level: Not on file   Occupational History    Not on file   Social Needs    Financial resource strain: Not on file    Food insecurity     Worry: Not on file     Inability: Not on file    Transportation needs     Medical: Not on file     Non-medical: Not on file   Tobacco Use    Smoking status: Former Smoker     Years: 20.00     Last attempt to quit: 1997     Years since quittin.3    Smokeless tobacco: Never Used   Substance and Sexual Activity    Alcohol use: No     Alcohol/week: 0.0 standard drinks    Drug use: No    Sexual activity: Yes     Partners: Female     Birth control/protection: None   Lifestyle    Physical activity     Days per week: Not on file     Minutes per session: Not on file    Stress: Not on file   Relationships    Social connections     Talks on phone: Not on file     Gets together: Not on file     Attends Synagogue service: Not on file     Active member of club or organization: Not on file     Attends meetings of clubs or organizations: Not on file     Relationship status: Not on file    Intimate partner violence     Fear of current or ex partner: Not on file     Emotionally abused: Not on file     Physically abused: Not on file     Forced sexual activity: Not on file   Other Topics Concern    Not on file   Social History Narrative    Not on file     No current facility-administered medications for this encounter.            Current Outpatient Medications   Medication Sig    benazepriL (LOTENSIN) 20 mg tablet Take 2 tabs daily    omeprazole (PRILOSEC) 20 mg capsule Take 1 capsule by mouth once daily    Eliquis 5 mg tablet Take 1 tablet by mouth twice daily    Vitamin D2 1,250 mcg (50,000 unit) capsule Take 1 capsule by mouth once a week    ALPRAZolam (XANAX) 0.25 mg tablet TAKE 1 TABLET BY MOUTH ONCE DAILY AS NEEDED FOR ANXIETY    cyclobenzaprine (FLEXERIL) 5 mg tablet TAKE 1 TABLET BY MOUTH THREE TIMES DAILY AS NEEDED FOR MUSCLE SPASM    flecainide (TAMBOCOR) 100 mg tablet Take 1 Tab by mouth two (2) times a day. (Patient taking differently: Take 150 mg by mouth two (2) times a day.)    varicella-zoster recombinant, PF, (SHINGRIX, PF,) 50 mcg/0.5 mL susr injection 0.5mL by IntraMUSCular route once now and then repeat in 2-6 months    oxybutynin chloride XL (DITROPAN XL) 10 mg CR tablet TAKE 1 TABLET BY MOUTH ONCE DAILY    B.infantis-B.ani-B.long-B.bifi (PROBIOTIC 4X) 10-15 mg TbEC Take by mouth daily. Indications: Pt takes 1 probiotic tablet daily    buPROPion XL (WELLBUTRIN XL) 150 mg tablet TAKE 1 TABLET BY MOUTH ONCE DAILY IN THE MORNING    furosemide (LASIX) 20 mg tablet Take 1 Tab by mouth daily. Prior to Admission Medications:        Prior to Admission medications    Medication Sig Authorizing Provider   benazepriL (LOTENSIN) 20 mg tablet Take 2 tabs daily Nilda Olmos MD   omeprazole (PRILOSEC) 20 mg capsule Take 1 capsule by mouth once daily Ambar Madera MD   Eliquis 5 mg tablet Take 1 tablet by mouth twice daily Ambar Self MD   Vitamin D2 1,250 mcg (50,000 unit) capsule Take 1 capsule by mouth once a week Ambar Madera MD   ALPRAZolam (XANAX) 0.25 mg tablet TAKE 1 TABLET BY MOUTH ONCE DAILY AS NEEDED FOR ANXIETY Rai Michele MD   cyclobenzaprine (FLEXERIL) 5 mg tablet TAKE 1 TABLET BY MOUTH THREE TIMES DAILY AS NEEDED FOR MUSCLE SPASM Rai Michele MD   flecainide (TAMBOCOR) 100 mg tablet Take 1 Tab by mouth two (2) times a day. Patient taking differently: Take 150 mg by mouth two (2) times a day.  Cyril Lewis III, DO   varicella-zoster recombinant, PF, (SHINGRIX, PF,) 50 mcg/0.5 mL susr injection 0.5mL by IntraMUSCular route once now and then repeat in 2-6 months Rai Michele MD   oxybutynin chloride XL (DITROPAN XL) 10 mg CR tablet TAKE 1 TABLET BY MOUTH ONCE DAILY Ambar Madera MD   B.infantis-B.ani-B.long-B.bifi (PROBIOTIC 4X) 10-15 mg TbEC Take by mouth daily. Indications: Pt takes 1 probiotic tablet daily Provider, Historical   buPROPion XL (WELLBUTRIN XL) 150 mg tablet TAKE 1 TABLET BY MOUTH ONCE DAILY IN THE MORNING Ambar Madera MD   furosemide (LASIX) 20 mg tablet Take 1 Tab by mouth daily. Kirti Handley MD     Review of Symptoms: Except as noted in HPI, patient denies recent fever or chills, nausea, vomiting, diarrhea, hemoptysis, hematemesis, dysuria, myalgias, focal neurologic symptoms, ecchymosis, angioedema, odynophagia, dysphagia, sore throat, earache,rash, melena, hematochezia, depression, GERD, cold intolerance, petechia, bleeding gums, or significant weight loss. 24 hr VS reviewed, overall VSSAF   Temp (24hrs), Av.1 °F (36.7 °C), Min:98.1 °F (36.7 °C), Max:98.1 °F (36.7 °C)     Patient Vitals for the past 8 hrs:    Pulse   20 1225 76   20 1045 (!) 43   20 1021 (!) 44   20 0830 (!) 46     Patient Vitals for the past 8 hrs:    Resp   20 1225 18   20 1045 20   20 0830 22     Patient Vitals for the past 8 hrs:    BP   20 1045 (!) 135/92   20 1021 (!) 193/77   20 0830 (!) 142/97     No intake or output data in the 24 hours ending 20 1230   Physical Exam (complete single organ system exam)   Cons: The patient is no distress. Appears stated age. HEENT: Normal conjunctivae and palate. No xanthelasma. Neck: Flat JVP without appreciable HJR. Resp: Normal respiratory effort with clear lungs bilaterally. CV: Regular rate and rhythm. PMI not palpated. Normal S1,S2   No gallop or rubs appreciated. No murmur appreciated. Intact carotid upstroke bilaterally without appreciated bruits. Abdominal aorta not palpated; no abdominal bruit noted. Intact pedal pulses. No peripheral edema.    GI: No abd mass noted, soft; no organomegaly noted. Bowel sounds present. Muscular: No significant kyphosis. Strength WNL for age. Ext: No cyanosis, clubbing, or stigmata of peripheral embolization. Derm: No ulcers or stasis dermatitis of lower extremities. Neuro: Alert and oriented x 3; Grossly non-focal. Normal mood and affect.    Labs:        Recent Labs    11/22/20   0834   TROIQ <0.05         Recent Labs    11/22/20   0834      K 4.1   *   CO2 25   BUN 17   CREA 0.87   GFRAA >60   *   PHOS 3.0   CA 8.8   ALB 3.2*   WBC 6.2   HGB 13.4   HCT 41.1        Bunny Mendieta MD

## 2020-12-02 RX ORDER — OMEPRAZOLE 20 MG/1
CAPSULE, DELAYED RELEASE ORAL
Qty: 90 CAP | Refills: 0 | Status: SHIPPED | OUTPATIENT
Start: 2020-12-02

## 2021-03-08 NOTE — LETTER
7/5/2018 8:11 AM 
 
Ms. Kota Gill [address] Dear Kota Gill: 
 
Please find your most recent results below. Resulted Orders METABOLIC PANEL, COMPREHENSIVE Result Value Ref Range Glucose 101 (H) 65 - 99 mg/dL BUN 19 8 - 27 mg/dL Creatinine 0.84 0.57 - 1.00 mg/dL GFR est non-AA 72 >59 mL/min/1.73 GFR est AA 83 >59 mL/min/1.73  
 BUN/Creatinine ratio 23 12 - 28 Sodium 141 134 - 144 mmol/L Potassium 4.8 3.5 - 5.2 mmol/L Chloride 106 96 - 106 mmol/L  
 CO2 20 20 - 29 mmol/L Comment: **Please note reference interval change** Calcium 9.3 8.7 - 10.3 mg/dL Protein, total 6.6 6.0 - 8.5 g/dL Albumin 4.1 3.6 - 4.8 g/dL GLOBULIN, TOTAL 2.5 1.5 - 4.5 g/dL A-G Ratio 1.6 1.2 - 2.2 Bilirubin, total 0.4 0.0 - 1.2 mg/dL Alk. phosphatase 91 39 - 117 IU/L  
 AST (SGOT) 18 0 - 40 IU/L  
 ALT (SGPT) 19 0 - 32 IU/L Narrative Performed at:  [address]  [id number] : Nini Augustin MD, Phone:  [phone] CBC WITH AUTOMATED DIFF Result Value Ref Range WBC 7.1 3.4 - 10.8 x10E3/uL  
 RBC 5.56 (H) 3.77 - 5.28 x10E6/uL HGB 15.1 11.1 - 15.9 g/dL HCT 46.0 34.0 - 46.6 % MCV 83 79 - 97 fL  
 MCH 27.2 26.6 - 33.0 pg  
 MCHC 32.8 31.5 - 35.7 g/dL  
 RDW 14.1 12.3 - 15.4 % PLATELET 388 747 - 013 x10E3/uL NEUTROPHILS 63 Not Estab. % Lymphocytes 22 Not Estab. % MONOCYTES 8 Not Estab. % EOSINOPHILS 7 Not Estab. % BASOPHILS 0 Not Estab. %  
 ABS. NEUTROPHILS 4.4 1.4 - 7.0 x10E3/uL Abs Lymphocytes 1.6 0.7 - 3.1 x10E3/uL  
 ABS. MONOCYTES 0.6 0.1 - 0.9 x10E3/uL  
 ABS. EOSINOPHILS 0.5 (H) 0.0 - 0.4 x10E3/uL  
 ABS. BASOPHILS 0.0 0.0 - 0.2 x10E3/uL IMMATURE GRANULOCYTES 0 Not Estab. %  
 ABS. IMM. GRANS. 0.0 0.0 - 0.1 x10E3/uL Narrative Performed at:  [place] [address] [address]  [id number] : Eleni Pelayo MD, Phone:  [phone] VITAMIN D, 25 HYDROXY Result Value Ref Range VITAMIN D, 25-HYDROXY 16.8 (L) 30.0 - 100.0 ng/mL Comment:  
   Vitamin D deficiency has been defined by the [address] [place] [address] practice guideline as a 
level of serum 25-OH vitamin D less than 20 ng/mL (1,2). The Endocrine Society went on to further define vitamin D 
insufficiency as a level between 21 and 29 ng/mL (2). 1. IOM ([place] of Medicine). 2010. Dietary reference 
   intakes for calcium and D. [facility]: The 
   [place]. 2. Suze MF, Michelle NC, Karen COLLADO, et al. 
   Evaluation, treatment, and prevention of vitamin D 
   deficiency: an Endocrine Society clinical practice 
   guideline. JCEM. 2011 Jul; 96(7):1911-30. Narrative Performed at:  [address]  [id number] : Eleni Pelayo MD, Phone:  [phone] RECOMMENDATIONS: 
Kidney and liver function Normal blood count Vitamin D is low  -Recommend vitamin D loading dose of 50,000 iu  once a week for 12 weeks, once done with loading dose, take 2,000 iu vitamin D3 once a day over the counter to maintain level. Please call me if you have any questions: [phone] Sincerely, Jeri Borrero MD 
 Anesthesia Volume In Cc (Will Not Render If 0): 0.5

## 2022-03-18 PROBLEM — E66.01 OBESITY, MORBID (HCC): Status: ACTIVE | Noted: 2017-11-30

## 2023-05-10 RX ORDER — FUROSEMIDE 20 MG/1
20 TABLET ORAL DAILY
COMMUNITY
Start: 2017-02-01

## 2023-05-10 RX ORDER — ERGOCALCIFEROL 1.25 MG/1
1 CAPSULE ORAL WEEKLY
COMMUNITY
Start: 2020-03-14

## 2023-05-10 RX ORDER — OMEPRAZOLE 20 MG/1
1 CAPSULE, DELAYED RELEASE ORAL DAILY
COMMUNITY
Start: 2020-12-02

## 2023-05-10 RX ORDER — CYCLOBENZAPRINE HCL 5 MG
1 TABLET ORAL 3 TIMES DAILY PRN
COMMUNITY
Start: 2019-09-13

## 2023-05-10 RX ORDER — ALPRAZOLAM 0.25 MG/1
TABLET ORAL
COMMUNITY
Start: 2019-11-19

## 2023-05-10 RX ORDER — FLECAINIDE ACETATE 100 MG/1
100 TABLET ORAL 2 TIMES DAILY
COMMUNITY
Start: 2019-08-27

## 2023-05-10 RX ORDER — OXYBUTYNIN CHLORIDE 10 MG/1
1 TABLET, EXTENDED RELEASE ORAL DAILY
COMMUNITY
Start: 2019-07-15

## 2023-05-10 RX ORDER — BENAZEPRIL HYDROCHLORIDE 20 MG/1
TABLET ORAL
COMMUNITY
Start: 2020-11-22

## 2023-05-10 RX ORDER — BUPROPION HYDROCHLORIDE 150 MG/1
1 TABLET ORAL EVERY MORNING
COMMUNITY
Start: 2019-05-19

## 2023-08-31 NOTE — DISCHARGE INSTRUCTIONS
Musculoskeletal Pain: Care Instructions  Your Care Instructions  Different problems with the bones, muscles, nerves, ligaments, and tendons in the body can cause pain. One or more areas of your body may ache or burn. Or they may feel tired, stiff, or sore. The medical term for this type of pain is musculoskeletal pain. It can have many different causes. Sometimes the pain is caused by an injury such as a strain or sprain. Or you might have pain from using one part of your body in the same way over and over again. This is called overuse. In some cases, the cause of the pain is another health problem such as arthritis or fibromyalgia. The doctor will examine you and ask you questions about your health to help find the cause of your pain. Blood tests or imaging tests like an X-ray may also be helpful. But sometimes doctors can't find a cause of the pain. Treatment depends on your symptoms and the cause of the pain, if known. The doctor has checked you carefully, but problems can develop later. If you notice any problems or new symptoms, get medical treatment right away. Follow-up care is a key part of your treatment and safety. Be sure to make and go to all appointments, and call your doctor if you are having problems. It's also a good idea to know your test results and keep a list of the medicines you take. How can you care for yourself at home? · Rest until you feel better. · Do not do anything that makes the pain worse. Return to exercise gradually if you feel better and your doctor says it's okay. · Be safe with medicines. Read and follow all instructions on the label. ¨ If the doctor gave you a prescription medicine for pain, take it as prescribed. ¨ If you are not taking a prescription pain medicine, ask your doctor if you can take an over-the-counter medicine. · Put ice or a cold pack on the area for 10 to 20 minutes at a time to ease pain. Put a thin cloth between the ice and your skin.   When should you call for help? Call your doctor now or seek immediate medical care if:  · You have new pain, or your pain gets worse. · You have new symptoms such as a fever, a rash, or chills. Watch closely for changes in your health, and be sure to contact your doctor if:  · You do not get better as expected. Where can you learn more? Go to [employer]  Enter Q624 in the search box to learn more about \"Musculoskeletal Pain: Care Instructions. \"   © 8088-0884 [employer], [employer]. Care instructions adapted under license by [employer] (which disclaims liability or warranty for this information). This care instruction is for use with your licensed healthcare professional. If you have questions about a medical condition or this instruction, always ask your healthcare professional. [address] any warranty or liability for your use of this information. Content Version: 54.1.822540; Current as of: November 20, 2015    9:14 AM  Johnny Tellotia  results have been reviewed with her. She has been counseled regarding her diagnosis. She verbally conveys understanding and agreement of the signs, symptoms, diagnosis, treatment and prognosis and additionally agrees to follow up as recommended with Dr. Rafael MD in 24 - 48 hours. She also agrees with the care-plan and conveys that all of her questions have been answered. I have also put together some discharge instructions for her that include: 1) educational information regarding their diagnosis, 2) how to care for their diagnosis at home, as well a 3) list of reasons why they would want to return to the ED prior to their follow-up appointment, should their condition change. Retention Suture Bite Size: 3 mm

## 2024-01-27 ENCOUNTER — TRANSCRIBE ORDERS (OUTPATIENT)
Facility: HOSPITAL | Age: 74
End: 2024-01-27

## 2024-01-27 ENCOUNTER — HOSPITAL ENCOUNTER (OUTPATIENT)
Facility: HOSPITAL | Age: 74
End: 2024-01-27
Payer: MEDICARE

## 2024-01-27 DIAGNOSIS — R06.02 SHORTNESS OF BREATH: Primary | ICD-10-CM

## 2024-01-27 DIAGNOSIS — R06.02 SHORTNESS OF BREATH: ICD-10-CM

## 2024-01-27 PROCEDURE — 71046 X-RAY EXAM CHEST 2 VIEWS: CPT

## 2024-01-29 ENCOUNTER — HOSPITAL ENCOUNTER (EMERGENCY)
Facility: HOSPITAL | Age: 74
Discharge: HOME | End: 2024-01-29
Payer: MEDICARE

## 2024-01-29 ENCOUNTER — APPOINTMENT (OUTPATIENT)
Facility: HOSPITAL | Age: 74
End: 2024-01-29
Payer: MEDICARE

## 2024-01-29 VITALS
HEIGHT: 62 IN | RESPIRATION RATE: 20 BRPM | DIASTOLIC BLOOD PRESSURE: 91 MMHG | BODY MASS INDEX: 53.92 KG/M2 | TEMPERATURE: 98.1 F | OXYGEN SATURATION: 94 % | HEART RATE: 87 BPM | SYSTOLIC BLOOD PRESSURE: 151 MMHG | WEIGHT: 293 LBS

## 2024-01-29 DIAGNOSIS — R06.02 SHORTNESS OF BREATH: Primary | ICD-10-CM

## 2024-01-29 DIAGNOSIS — J18.9 COMMUNITY ACQUIRED PNEUMONIA OF RIGHT LUNG, UNSPECIFIED PART OF LUNG: ICD-10-CM

## 2024-01-29 LAB
ALBUMIN SERPL-MCNC: 3.3 G/DL (ref 3.5–5)
ALBUMIN/GLOB SERPL: 0.9 (ref 1.1–2.2)
ALP SERPL-CCNC: 103 U/L (ref 45–117)
ALT SERPL-CCNC: 27 U/L (ref 12–78)
ANION GAP SERPL CALC-SCNC: 6 MMOL/L (ref 5–15)
AST SERPL-CCNC: 19 U/L (ref 15–37)
BASOPHILS # BLD: 0.1 K/UL (ref 0–0.1)
BASOPHILS NFR BLD: 1 % (ref 0–1)
BILIRUB SERPL-MCNC: 0.9 MG/DL (ref 0.2–1)
BUN SERPL-MCNC: 14 MG/DL (ref 6–20)
BUN/CREAT SERPL: 15 (ref 12–20)
CALCIUM SERPL-MCNC: 8.4 MG/DL (ref 8.5–10.1)
CHLORIDE SERPL-SCNC: 105 MMOL/L (ref 97–108)
CO2 SERPL-SCNC: 26 MMOL/L (ref 21–32)
CREAT SERPL-MCNC: 0.94 MG/DL (ref 0.55–1.02)
DIFFERENTIAL METHOD BLD: ABNORMAL
EOSINOPHIL # BLD: 0.3 K/UL (ref 0–0.4)
EOSINOPHIL NFR BLD: 3 % (ref 0–7)
ERYTHROCYTE [DISTWIDTH] IN BLOOD BY AUTOMATED COUNT: 14.6 % (ref 11.5–14.5)
GLOBULIN SER CALC-MCNC: 3.8 G/DL (ref 2–4)
GLUCOSE SERPL-MCNC: 123 MG/DL (ref 65–100)
HCT VFR BLD AUTO: 47 % (ref 35–47)
HGB BLD-MCNC: 15.4 G/DL (ref 11.5–16)
IMM GRANULOCYTES # BLD AUTO: 0 K/UL (ref 0–0.04)
IMM GRANULOCYTES NFR BLD AUTO: 0 % (ref 0–0.5)
LYMPHOCYTES # BLD: 1.7 K/UL (ref 0.8–3.5)
LYMPHOCYTES NFR BLD: 20 % (ref 12–49)
MAGNESIUM SERPL-MCNC: 1.8 MG/DL (ref 1.6–2.4)
MCH RBC QN AUTO: 27.7 PG (ref 26–34)
MCHC RBC AUTO-ENTMCNC: 32.8 G/DL (ref 30–36.5)
MCV RBC AUTO: 84.7 FL (ref 80–99)
MONOCYTES # BLD: 0.8 K/UL (ref 0–1)
MONOCYTES NFR BLD: 10 % (ref 5–13)
NEUTS SEG # BLD: 5.5 K/UL (ref 1.8–8)
NEUTS SEG NFR BLD: 66 % (ref 32–75)
NRBC # BLD: 0 K/UL (ref 0–0.01)
NRBC BLD-RTO: 0 PER 100 WBC
NT PRO BNP: 784 PG/ML
PLATELET # BLD AUTO: 185 K/UL (ref 150–400)
PMV BLD AUTO: 11.3 FL (ref 8.9–12.9)
POTASSIUM SERPL-SCNC: 3.8 MMOL/L (ref 3.5–5.1)
PROT SERPL-MCNC: 7.1 G/DL (ref 6.4–8.2)
RBC # BLD AUTO: 5.55 M/UL (ref 3.8–5.2)
SODIUM SERPL-SCNC: 137 MMOL/L (ref 136–145)
TROPONIN I SERPL HS-MCNC: 15 NG/L (ref 0–51)
TROPONIN I SERPL HS-MCNC: 16 NG/L (ref 0–51)
WBC # BLD AUTO: 8.4 K/UL (ref 3.6–11)

## 2024-01-29 PROCEDURE — 71046 X-RAY EXAM CHEST 2 VIEWS: CPT

## 2024-01-29 PROCEDURE — 80053 COMPREHEN METABOLIC PANEL: CPT

## 2024-01-29 PROCEDURE — 99284 EMERGENCY DEPT VISIT MOD MDM: CPT

## 2024-01-29 PROCEDURE — 36415 COLL VENOUS BLD VENIPUNCTURE: CPT

## 2024-01-29 PROCEDURE — 83880 ASSAY OF NATRIURETIC PEPTIDE: CPT

## 2024-01-29 PROCEDURE — 84484 ASSAY OF TROPONIN QUANT: CPT

## 2024-01-29 PROCEDURE — 85025 COMPLETE CBC W/AUTO DIFF WBC: CPT

## 2024-01-29 PROCEDURE — 83735 ASSAY OF MAGNESIUM: CPT

## 2024-01-29 RX ORDER — ALBUTEROL SULFATE 90 UG/1
2 AEROSOL, METERED RESPIRATORY (INHALATION) EVERY 6 HOURS PRN
Qty: 18 G | Refills: 3 | Status: SHIPPED | OUTPATIENT
Start: 2024-01-29

## 2024-01-29 ASSESSMENT — LIFESTYLE VARIABLES
HOW OFTEN DO YOU HAVE A DRINK CONTAINING ALCOHOL: NEVER
HOW MANY STANDARD DRINKS CONTAINING ALCOHOL DO YOU HAVE ON A TYPICAL DAY: PATIENT DOES NOT DRINK

## 2024-01-29 ASSESSMENT — HEART SCORE: ECG: 0

## 2024-01-29 ASSESSMENT — PAIN DESCRIPTION - LOCATION: LOCATION: CHEST

## 2024-01-29 ASSESSMENT — PAIN SCALES - GENERAL: PAINLEVEL_OUTOF10: 4

## 2024-01-30 NOTE — DISCHARGE INSTRUCTIONS
provided to you by the pharmacy. If you have any questions or reservations about taking the medication due to side effects or interactions with other medications, please call your primary care physician or contact the ER to speak with the charge nurse.     Please make an appointment with your family doctor or the physician you were referred to for follow-up of this visit as instructed on your discharge paperwork. Return to the ER if you are unable to be seen or if you are unable to be seen in a timely manner.    Should you experience abdominal pain lasting greater than 6 hours, chest pain, difficulty breathing, fever/chills, numbness/tingling, skin changes or other symptoms that concern you, return to the ED sooner. If you feel worse over the next 24 hours, please return to the ED. We are available 24 hours a day. Thank you for trusting us with your care!

## 2024-01-30 NOTE — ED PROVIDER NOTES
The pulmonary vasculature is within normal limits. Previously seen airspace disease at the medial right lung base is not significantly changed. The visualized bones and upper abdomen are age-appropriate.     Unchanged mild right basilar airspace disease. Unchanged cardiomegaly.       PROCEDURES   Unless otherwise noted below, none  Procedures     CRITICAL CARE TIME   Patient does not meet Critical Care Time, 0 minutes     EMERGENCY DEPARTMENT COURSE and DIFFERENTIAL DIAGNOSIS/MDM   Vitals:    Vitals:    01/29/24 2003 01/29/24 2014 01/29/24 2128 01/29/24 2145   BP:   (!) 151/91    Pulse: 88 80 84    Resp: 18 16 22    Temp:       TempSrc:       SpO2: 96% 95% 95% 96%   Weight:       Height:            Patient was given the following medications:  Medications   iopamidol (ISOVUE-370) 76 % injection 100 mL (has no administration in time range)       CONSULTS: (Who and What was discussed)  None    Chronic Conditions: GERD, obesity, HTN, anxiety,     Social Determinants affecting Dx or Tx: None    Records Reviewed (source and summary of external records): Nursing Notes    CC/HPI Summary, DDx, ED Course, and Reassessment: Patient presents to the ED for SOB. Vitals stable. Not tachycardic, hypoxic or tachypneic. Patient maintains oxygen saturation with exertion. She had a known PNA on CXR prior to arrival. Already completed course of Augmentin and steroids and was placed on another course of Abx by PCP, which patient has not started. No leukocytosis to suggest significant infection. No anemia or significant electrolyte abnormality. Patient in afib but baseline; normal rate. Serial troponin negative. No evidence of ischemic heart disease. Heart score 3. BNP slightly elevated but no physical exam findings consistent with volume overload. Albuterol inhaler prescribed at patient request. She was encouraged to fill and complete the course of the antibiotic that was prescribed by PCP. She should keep scheduled follow up

## 2024-01-30 NOTE — ED NOTES
1931: Assumed care of patient at this time from the waiting room. Patient taken to the bathroom at this time by family member in her wheel chair.    1940: WENDY Heck bedside at this time    1949: Patient placed on the monitor x 3, SR x 2, call light in reach with daughter bedside.    2055: Patient taken to CT at this time     2128: WENDY Heck bedside at this time. Patient was unable to lay flat for CT    2133: Patient taken to XRAY at this time    2230: Patient vitals updated. Patient discharged at this time. Patient provided with discharge paperwork and education. No further questions at this time.